# Patient Record
Sex: MALE | Race: BLACK OR AFRICAN AMERICAN | NOT HISPANIC OR LATINO | Employment: FULL TIME | URBAN - METROPOLITAN AREA
[De-identification: names, ages, dates, MRNs, and addresses within clinical notes are randomized per-mention and may not be internally consistent; named-entity substitution may affect disease eponyms.]

---

## 2017-06-08 ENCOUNTER — HISTORICAL (OUTPATIENT)
Dept: INTERNAL MEDICINE | Facility: CLINIC | Age: 52
End: 2017-06-08

## 2017-06-08 LAB
CHOLEST SERPL-MCNC: 226 MG/DL
CHOLEST/HDLC SERPL: 7.3 {RATIO} (ref 0–5)
EST. AVERAGE GLUCOSE BLD GHB EST-MCNC: 120 MG/DL
HBA1C MFR BLD: 5.8 % (ref 4.7–5.6)
HDLC SERPL-MCNC: 31 MG/DL
LDLC SERPL CALC-MCNC: 171 MG/DL (ref 0–130)
TRIGL SERPL-MCNC: 120 MG/DL
VLDLC SERPL CALC-MCNC: 24 MG/DL

## 2017-08-07 ENCOUNTER — HISTORICAL (OUTPATIENT)
Dept: INTERNAL MEDICINE | Facility: CLINIC | Age: 52
End: 2017-08-07

## 2017-08-07 LAB
CHOLEST SERPL-MCNC: 172 MG/DL
CHOLEST/HDLC SERPL: 4 {RATIO} (ref 0–5)
HDLC SERPL-MCNC: 43 MG/DL
LDLC SERPL CALC-MCNC: 108 MG/DL (ref 0–130)
TRIGL SERPL-MCNC: 103 MG/DL
VLDLC SERPL CALC-MCNC: 21 MG/DL

## 2017-10-18 LAB
COLOR STL: NORMAL
CONSISTENCY STL: NORMAL
HEMOCCULT SP1 STL QL: NEGATIVE

## 2017-10-19 LAB
COLOR STL: NORMAL
CONSISTENCY STL: NORMAL
HEMOCCULT SP2 STL QL: NEGATIVE

## 2017-10-20 LAB
COLOR STL: NORMAL
CONSISTENCY STL: NORMAL

## 2017-10-24 ENCOUNTER — HISTORICAL (OUTPATIENT)
Dept: INTERNAL MEDICINE | Facility: CLINIC | Age: 52
End: 2017-10-24

## 2017-10-24 LAB
ABS NEUT (OLG): 3.38 X10(3)/MCL (ref 2.1–9.2)
ALBUMIN SERPL-MCNC: 3.4 GM/DL (ref 3.4–5)
ALBUMIN/GLOB SERPL: 1 RATIO (ref 1–2)
ALP SERPL-CCNC: 52 UNIT/L (ref 45–117)
ALT SERPL-CCNC: 34 UNIT/L (ref 12–78)
AST SERPL-CCNC: 26 UNIT/L (ref 15–37)
BASOPHILS # BLD AUTO: 0.03 X10(3)/MCL
BASOPHILS NFR BLD AUTO: 0 % (ref 0–1)
BILIRUB SERPL-MCNC: 0.5 MG/DL (ref 0.2–1)
BILIRUBIN DIRECT+TOT PNL SERPL-MCNC: 0.1 MG/DL
BILIRUBIN DIRECT+TOT PNL SERPL-MCNC: 0.4 MG/DL
BUN SERPL-MCNC: 18 MG/DL (ref 7–18)
CALCIUM SERPL-MCNC: 8.8 MG/DL (ref 8.5–10.1)
CHLORIDE SERPL-SCNC: 103 MMOL/L (ref 98–107)
CO2 SERPL-SCNC: 31 MMOL/L (ref 21–32)
CREAT SERPL-MCNC: 0.9 MG/DL (ref 0.6–1.3)
EOSINOPHIL # BLD AUTO: 0.24 10*3/UL
EOSINOPHIL NFR BLD AUTO: 4 % (ref 0–5)
ERYTHROCYTE [DISTWIDTH] IN BLOOD BY AUTOMATED COUNT: 12.8 % (ref 11.5–14.5)
EST. AVERAGE GLUCOSE BLD GHB EST-MCNC: 140 MG/DL
GLOBULIN SER-MCNC: 3.6 GM/ML (ref 2.3–3.5)
GLUCOSE SERPL-MCNC: 108 MG/DL (ref 74–106)
HBA1C MFR BLD: 6.5 % (ref 4.2–6.3)
HCT VFR BLD AUTO: 44.2 % (ref 40–51)
HGB BLD-MCNC: 14.5 GM/DL (ref 13.5–17.5)
IMM GRANULOCYTES # BLD AUTO: 0.01 10*3/UL
IMM GRANULOCYTES NFR BLD AUTO: 0 %
LYMPHOCYTES # BLD AUTO: 2 X10(3)/MCL
LYMPHOCYTES NFR BLD AUTO: 32 % (ref 15–40)
MCH RBC QN AUTO: 27.5 PG (ref 26–34)
MCHC RBC AUTO-ENTMCNC: 32.8 GM/DL (ref 31–37)
MCV RBC AUTO: 83.9 FL (ref 80–100)
MONOCYTES # BLD AUTO: 0.65 X10(3)/MCL
MONOCYTES NFR BLD AUTO: 10 % (ref 4–12)
NEUTROPHILS # BLD AUTO: 3.38 X10(3)/MCL
NEUTROPHILS NFR BLD AUTO: 54 X10(3)/MCL
PLATELET # BLD AUTO: 233 X10(3)/MCL (ref 130–400)
PMV BLD AUTO: 9.9 FL (ref 7.4–10.4)
POTASSIUM SERPL-SCNC: 4 MMOL/L (ref 3.5–5.1)
PROT SERPL-MCNC: 7 GM/DL (ref 6.4–8.2)
PSA SERPL-MCNC: 0.9 NG/ML
RBC # BLD AUTO: 5.27 X10(6)/MCL (ref 4.5–5.9)
SODIUM SERPL-SCNC: 140 MMOL/L (ref 136–145)
TSH SERPL-ACNC: 0.92 MIU/L (ref 0.36–3.74)
WBC # SPEC AUTO: 6.3 X10(3)/MCL (ref 4.5–11)

## 2018-01-05 ENCOUNTER — HISTORICAL (OUTPATIENT)
Dept: INTERNAL MEDICINE | Facility: CLINIC | Age: 53
End: 2018-01-05

## 2018-01-05 LAB
BUN SERPL-MCNC: 16 MG/DL (ref 7–18)
CALCIUM SERPL-MCNC: 8.7 MG/DL (ref 8.5–10.1)
CHLORIDE SERPL-SCNC: 103 MMOL/L (ref 98–107)
CO2 SERPL-SCNC: 29 MMOL/L (ref 21–32)
CREAT SERPL-MCNC: 0.8 MG/DL (ref 0.6–1.3)
CREAT UR-MCNC: 138 MG/DL
EST. AVERAGE GLUCOSE BLD GHB EST-MCNC: 131 MG/DL
GLUCOSE SERPL-MCNC: 109 MG/DL (ref 74–106)
HBA1C MFR BLD: 6.2 % (ref 4.2–6.3)
MICROALBUMIN UR-MCNC: 7.5 MG/L (ref 0–19)
MICROALBUMIN/CREAT RATIO PNL UR: 5.4 MCG/MG CR (ref 0–29)
POTASSIUM SERPL-SCNC: 3.9 MMOL/L (ref 3.5–5.1)
SODIUM SERPL-SCNC: 139 MMOL/L (ref 136–145)

## 2018-03-29 ENCOUNTER — HISTORICAL (OUTPATIENT)
Dept: INTERNAL MEDICINE | Facility: CLINIC | Age: 53
End: 2018-03-29

## 2018-03-29 LAB
APPEARANCE, UA: CLEAR
BACTERIA #/AREA URNS AUTO: ABNORMAL /[HPF]
BILIRUB UR QL STRIP: NEGATIVE
CHOLEST SERPL-MCNC: 177 MG/DL
CHOLEST/HDLC SERPL: 5.1 {RATIO} (ref 0–5)
COLOR UR: YELLOW
CREAT UR-MCNC: 260 MG/DL
EST. AVERAGE GLUCOSE BLD GHB EST-MCNC: 131 MG/DL
GLUCOSE (UA): NORMAL
HBA1C MFR BLD: 6.2 % (ref 4.2–6.3)
HDLC SERPL-MCNC: 35 MG/DL
HGB UR QL STRIP: NEGATIVE
HYALINE CASTS #/AREA URNS LPF: ABNORMAL /[LPF]
KETONES UR QL STRIP: NEGATIVE
LDLC SERPL CALC-MCNC: 96 MG/DL (ref 0–130)
LEUKOCYTE ESTERASE UR QL STRIP: NEGATIVE
MICROALBUMIN UR-MCNC: 8.5 MG/L (ref 0–19)
MICROALBUMIN/CREAT RATIO PNL UR: 3.3 MCG/MG CR (ref 0–29)
NITRITE UR QL STRIP: NEGATIVE
PH UR STRIP: 7.5 [PH] (ref 4.5–8)
PROT UR QL STRIP: 20 MG/DL
RBC #/AREA URNS AUTO: ABNORMAL /[HPF]
SP GR UR STRIP: 1.02 (ref 1–1.03)
SQUAMOUS #/AREA URNS LPF: ABNORMAL /[LPF]
TRIGL SERPL-MCNC: 230 MG/DL
UROBILINOGEN UR STRIP-ACNC: 3 MG/DL
VLDLC SERPL CALC-MCNC: 46 MG/DL
WBC #/AREA URNS AUTO: ABNORMAL /HPF

## 2018-07-31 ENCOUNTER — HISTORICAL (OUTPATIENT)
Dept: INTERNAL MEDICINE | Facility: CLINIC | Age: 53
End: 2018-07-31

## 2018-07-31 LAB
ALBUMIN SERPL-MCNC: 3.6 GM/DL (ref 3.4–5)
ALBUMIN/GLOB SERPL: 1 RATIO (ref 1–2)
ALP SERPL-CCNC: 55 UNIT/L (ref 45–117)
ALT SERPL-CCNC: 30 UNIT/L (ref 12–78)
APPEARANCE, UA: CLEAR
AST SERPL-CCNC: 24 UNIT/L (ref 15–37)
BACTERIA #/AREA URNS AUTO: ABNORMAL /[HPF]
BILIRUB SERPL-MCNC: 0.5 MG/DL (ref 0.2–1)
BILIRUB UR QL STRIP: NEGATIVE
BILIRUBIN DIRECT+TOT PNL SERPL-MCNC: 0.1 MG/DL
BILIRUBIN DIRECT+TOT PNL SERPL-MCNC: 0.4 MG/DL
BUN SERPL-MCNC: 19 MG/DL (ref 7–18)
CALCIUM SERPL-MCNC: 8.7 MG/DL (ref 8.5–10.1)
CHLORIDE SERPL-SCNC: 104 MMOL/L (ref 98–107)
CO2 SERPL-SCNC: 29 MMOL/L (ref 21–32)
COLOR UR: NORMAL
CREAT SERPL-MCNC: 0.8 MG/DL (ref 0.6–1.3)
CREAT UR-MCNC: 183 MG/DL
EST. AVERAGE GLUCOSE BLD GHB EST-MCNC: 131 MG/DL
GLOBULIN SER-MCNC: 3.7 GM/ML (ref 2.3–3.5)
GLUCOSE (UA): NORMAL
GLUCOSE SERPL-MCNC: 105 MG/DL (ref 74–106)
HBA1C MFR BLD: 6.2 % (ref 4.2–6.3)
HGB UR QL STRIP: NEGATIVE
HYALINE CASTS #/AREA URNS LPF: ABNORMAL /[LPF]
KETONES UR QL STRIP: NEGATIVE
LEUKOCYTE ESTERASE UR QL STRIP: NEGATIVE
MICROALBUMIN UR-MCNC: 19 MG/L (ref 0–19)
MICROALBUMIN/CREAT RATIO PNL UR: 10.4 MCG/MG CR (ref 0–29)
NITRITE UR QL STRIP: NEGATIVE
PH UR STRIP: 6.5 [PH] (ref 4.5–8)
POTASSIUM SERPL-SCNC: 3.6 MMOL/L (ref 3.5–5.1)
PROT SERPL-MCNC: 7.3 GM/DL (ref 6.4–8.2)
PROT UR QL STRIP: NEGATIVE
RBC #/AREA URNS AUTO: ABNORMAL /[HPF]
SODIUM SERPL-SCNC: 140 MMOL/L (ref 136–145)
SP GR UR STRIP: 1.03 (ref 1–1.03)
SQUAMOUS #/AREA URNS LPF: ABNORMAL /[LPF]
UROBILINOGEN UR STRIP-ACNC: NORMAL
WBC #/AREA URNS AUTO: ABNORMAL /HPF

## 2018-08-23 ENCOUNTER — HISTORICAL (OUTPATIENT)
Dept: CARDIOLOGY | Facility: HOSPITAL | Age: 53
End: 2018-08-23

## 2018-09-04 ENCOUNTER — HISTORICAL (OUTPATIENT)
Dept: CARDIOLOGY | Facility: CLINIC | Age: 53
End: 2018-09-04

## 2018-09-04 LAB
APTT PPP: 28.6 SECOND(S) (ref 23.3–37)
BUN SERPL-MCNC: 18 MG/DL (ref 7–18)
CALCIUM SERPL-MCNC: 8.8 MG/DL (ref 8.5–10.1)
CHLORIDE SERPL-SCNC: 102 MMOL/L (ref 98–107)
CO2 SERPL-SCNC: 29 MMOL/L (ref 21–32)
CREAT SERPL-MCNC: 0.9 MG/DL (ref 0.6–1.3)
CREAT/UREA NIT SERPL: 20
ERYTHROCYTE [DISTWIDTH] IN BLOOD BY AUTOMATED COUNT: 12.5 % (ref 11.5–14.5)
GLUCOSE SERPL-MCNC: 146 MG/DL (ref 74–106)
HCT VFR BLD AUTO: 44.5 % (ref 40–51)
HGB BLD-MCNC: 14.6 GM/DL (ref 13.5–17.5)
INR PPP: 0.99 (ref 0.9–1.2)
MCH RBC QN AUTO: 27.7 PG (ref 26–34)
MCHC RBC AUTO-ENTMCNC: 32.8 GM/DL (ref 31–37)
MCV RBC AUTO: 84.3 FL (ref 80–100)
PLATELET # BLD AUTO: 229 X10(3)/MCL (ref 130–400)
PMV BLD AUTO: 9.5 FL (ref 7.4–10.4)
POTASSIUM SERPL-SCNC: 3.9 MMOL/L (ref 3.5–5.1)
PROTHROMBIN TIME: 12.4 SECOND(S) (ref 11.9–14.4)
RBC # BLD AUTO: 5.28 X10(6)/MCL (ref 4.5–5.9)
SODIUM SERPL-SCNC: 138 MMOL/L (ref 136–145)
WBC # SPEC AUTO: 5.7 X10(3)/MCL (ref 4.5–11)

## 2018-09-10 ENCOUNTER — HISTORICAL (OUTPATIENT)
Dept: CARDIOLOGY | Facility: HOSPITAL | Age: 53
End: 2018-09-10

## 2018-11-20 ENCOUNTER — HISTORICAL (OUTPATIENT)
Dept: INTERNAL MEDICINE | Facility: CLINIC | Age: 53
End: 2018-11-20

## 2018-11-20 LAB
ABS NEUT (OLG): 3.83 X10(3)/MCL (ref 2.1–9.2)
ALBUMIN SERPL-MCNC: 3.4 GM/DL (ref 3.4–5)
ALBUMIN/GLOB SERPL: 1 RATIO (ref 1–2)
ALP SERPL-CCNC: 58 UNIT/L (ref 45–117)
ALT SERPL-CCNC: 26 UNIT/L (ref 12–78)
APPEARANCE, UA: CLEAR
AST SERPL-CCNC: 20 UNIT/L (ref 15–37)
BACTERIA #/AREA URNS AUTO: ABNORMAL /[HPF]
BASOPHILS # BLD AUTO: 0.02 X10(3)/MCL
BASOPHILS NFR BLD AUTO: 0 %
BILIRUB SERPL-MCNC: 0.5 MG/DL (ref 0.2–1)
BILIRUB UR QL STRIP: NEGATIVE
BILIRUBIN DIRECT+TOT PNL SERPL-MCNC: 0.2 MG/DL
BILIRUBIN DIRECT+TOT PNL SERPL-MCNC: 0.3 MG/DL
BUN SERPL-MCNC: 18 MG/DL (ref 7–18)
CALCIUM SERPL-MCNC: 8.5 MG/DL (ref 8.5–10.1)
CHLORIDE SERPL-SCNC: 104 MMOL/L (ref 98–107)
CHOLEST SERPL-MCNC: 148 MG/DL
CHOLEST/HDLC SERPL: 4.2 {RATIO} (ref 0–5)
CO2 SERPL-SCNC: 31 MMOL/L (ref 21–32)
COLOR UR: YELLOW
CREAT SERPL-MCNC: 0.8 MG/DL (ref 0.6–1.3)
EOSINOPHIL # BLD AUTO: 0.24 X10(3)/MCL
EOSINOPHIL NFR BLD AUTO: 4 %
ERYTHROCYTE [DISTWIDTH] IN BLOOD BY AUTOMATED COUNT: 12.6 % (ref 11.5–14.5)
GLOBULIN SER-MCNC: 3.8 GM/ML (ref 2.3–3.5)
GLUCOSE (UA): NORMAL
GLUCOSE SERPL-MCNC: 111 MG/DL (ref 74–106)
HCT VFR BLD AUTO: 43.5 % (ref 40–51)
HDLC SERPL-MCNC: 35 MG/DL
HGB BLD-MCNC: 14.1 GM/DL (ref 13.5–17.5)
HGB UR QL STRIP: NEGATIVE
HYALINE CASTS #/AREA URNS LPF: ABNORMAL /[LPF]
IMM GRANULOCYTES # BLD AUTO: 0.01 10*3/UL
IMM GRANULOCYTES NFR BLD AUTO: 0 %
KETONES UR QL STRIP: NEGATIVE
LDLC SERPL CALC-MCNC: 96 MG/DL (ref 0–130)
LEUKOCYTE ESTERASE UR QL STRIP: NEGATIVE
LYMPHOCYTES # BLD AUTO: 1.83 X10(3)/MCL
LYMPHOCYTES NFR BLD AUTO: 28 % (ref 13–40)
MCH RBC QN AUTO: 27.6 PG (ref 26–34)
MCHC RBC AUTO-ENTMCNC: 32.4 GM/DL (ref 31–37)
MCV RBC AUTO: 85.1 FL (ref 80–100)
MONOCYTES # BLD AUTO: 0.63 X10(3)/MCL
MONOCYTES NFR BLD AUTO: 10 % (ref 4–12)
NEUTROPHILS # BLD AUTO: 3.83 X10(3)/MCL
NEUTROPHILS NFR BLD AUTO: 58 X10(3)/MCL
NITRITE UR QL STRIP: NEGATIVE
PH UR STRIP: 6 [PH] (ref 4.5–8)
PLATELET # BLD AUTO: 222 X10(3)/MCL (ref 130–400)
PMV BLD AUTO: 9.4 FL (ref 7.4–10.4)
POTASSIUM SERPL-SCNC: 3.8 MMOL/L (ref 3.5–5.1)
PROT SERPL-MCNC: 7.2 GM/DL (ref 6.4–8.2)
PROT UR QL STRIP: 20 MG/DL
PSA SERPL-MCNC: 0.8 NG/ML
RBC # BLD AUTO: 5.11 X10(6)/MCL (ref 4.5–5.9)
RBC #/AREA URNS AUTO: ABNORMAL /[HPF]
SODIUM SERPL-SCNC: 141 MMOL/L (ref 136–145)
SP GR UR STRIP: 1.03 (ref 1–1.03)
SQUAMOUS #/AREA URNS LPF: ABNORMAL /[LPF]
TRIGL SERPL-MCNC: 85 MG/DL
UROBILINOGEN UR STRIP-ACNC: 2 MG/DL
VLDLC SERPL CALC-MCNC: 17 MG/DL
WBC # SPEC AUTO: 6.6 X10(3)/MCL (ref 4.5–11)
WBC #/AREA URNS AUTO: ABNORMAL /HPF

## 2018-12-03 ENCOUNTER — HISTORICAL (OUTPATIENT)
Dept: INTERNAL MEDICINE | Facility: CLINIC | Age: 53
End: 2018-12-03

## 2019-03-08 ENCOUNTER — HISTORICAL (OUTPATIENT)
Dept: INTERNAL MEDICINE | Facility: CLINIC | Age: 54
End: 2019-03-08

## 2019-03-08 LAB
ALBUMIN SERPL-MCNC: 3.6 GM/DL (ref 3.4–5)
ALBUMIN/GLOB SERPL: 0.9 RATIO (ref 1.1–2)
ALP SERPL-CCNC: 56 UNIT/L (ref 45–117)
ALT SERPL-CCNC: 33 UNIT/L (ref 12–78)
AST SERPL-CCNC: 23 UNIT/L (ref 15–37)
BILIRUB SERPL-MCNC: 0.5 MG/DL (ref 0.2–1)
BILIRUBIN DIRECT+TOT PNL SERPL-MCNC: 0.1 MG/DL
BILIRUBIN DIRECT+TOT PNL SERPL-MCNC: 0.4 MG/DL
BUN SERPL-MCNC: 16 MG/DL (ref 7–18)
CALCIUM SERPL-MCNC: 8.8 MG/DL (ref 8.5–10.1)
CHLORIDE SERPL-SCNC: 104 MMOL/L (ref 98–107)
CHOLEST SERPL-MCNC: 176 MG/DL
CHOLEST/HDLC SERPL: 3.9 {RATIO} (ref 0–5)
CO2 SERPL-SCNC: 30 MMOL/L (ref 21–32)
CREAT SERPL-MCNC: 0.8 MG/DL (ref 0.6–1.3)
CREAT UR-MCNC: 77 MG/DL
EST. AVERAGE GLUCOSE BLD GHB EST-MCNC: 126 MG/DL
GLOBULIN SER-MCNC: 3.9 GM/ML (ref 2.3–3.5)
GLUCOSE SERPL-MCNC: 104 MG/DL (ref 74–106)
HBA1C MFR BLD: 6 % (ref 4.2–6.3)
HDLC SERPL-MCNC: 45 MG/DL
LDLC SERPL CALC-MCNC: 114 MG/DL (ref 0–130)
MICROALBUMIN UR-MCNC: <5 MG/L (ref 0–19)
MICROALBUMIN/CREAT RATIO PNL UR: <6.5 MCG/MG CR (ref 0–29)
POTASSIUM SERPL-SCNC: 4 MMOL/L (ref 3.5–5.1)
PROT SERPL-MCNC: 7.5 GM/DL (ref 6.4–8.2)
SODIUM SERPL-SCNC: 139 MMOL/L (ref 136–145)
TRIGL SERPL-MCNC: 86 MG/DL
VLDLC SERPL CALC-MCNC: 17 MG/DL

## 2019-03-12 ENCOUNTER — HISTORICAL (OUTPATIENT)
Dept: ADMINISTRATIVE | Facility: HOSPITAL | Age: 54
End: 2019-03-12

## 2019-03-20 ENCOUNTER — HISTORICAL (OUTPATIENT)
Dept: ADMINISTRATIVE | Facility: HOSPITAL | Age: 54
End: 2019-03-20

## 2019-04-03 ENCOUNTER — HISTORICAL (OUTPATIENT)
Dept: ADMINISTRATIVE | Facility: HOSPITAL | Age: 54
End: 2019-04-03

## 2019-05-01 ENCOUNTER — HISTORICAL (OUTPATIENT)
Dept: ADMINISTRATIVE | Facility: HOSPITAL | Age: 54
End: 2019-05-01

## 2019-09-16 ENCOUNTER — HISTORICAL (OUTPATIENT)
Dept: INTERNAL MEDICINE | Facility: CLINIC | Age: 54
End: 2019-09-16

## 2019-09-16 LAB
BUN SERPL-MCNC: 17 MG/DL (ref 7–18)
CALCIUM SERPL-MCNC: 8.6 MG/DL (ref 8.5–10.1)
CHLORIDE SERPL-SCNC: 105 MMOL/L (ref 98–107)
CHOLEST SERPL-MCNC: 156 MG/DL
CHOLEST/HDLC SERPL: 3.9 {RATIO} (ref 0–5)
CO2 SERPL-SCNC: 29 MMOL/L (ref 21–32)
CREAT SERPL-MCNC: 0.9 MG/DL (ref 0.6–1.3)
CREAT UR-MCNC: 172 MG/DL
CREAT/UREA NIT SERPL: 19
EST. AVERAGE GLUCOSE BLD GHB EST-MCNC: 123 MG/DL
GLUCOSE SERPL-MCNC: 98 MG/DL (ref 74–106)
HBA1C MFR BLD: 5.9 % (ref 4.2–6.3)
HDLC SERPL-MCNC: 40 MG/DL (ref 40–59)
LDLC SERPL CALC-MCNC: 103 MG/DL
MICROALBUMIN UR-MCNC: 6.8 MG/L (ref 0–19)
MICROALBUMIN/CREAT RATIO PNL UR: 4 MCG/MG CR (ref 0–29)
POTASSIUM SERPL-SCNC: 3.7 MMOL/L (ref 3.5–5.1)
SODIUM SERPL-SCNC: 140 MMOL/L (ref 136–145)
TRIGL SERPL-MCNC: 65 MG/DL
VLDLC SERPL CALC-MCNC: 13 MG/DL

## 2020-03-16 ENCOUNTER — HISTORICAL (OUTPATIENT)
Dept: LAB | Facility: HOSPITAL | Age: 55
End: 2020-03-16

## 2020-03-16 LAB
ABS NEUT (OLG): 2.98 X10(3)/MCL (ref 2.1–9.2)
ALBUMIN SERPL-MCNC: 3.4 GM/DL (ref 3.4–5)
ALBUMIN/GLOB SERPL: 0.9 RATIO (ref 1.1–2)
ALP SERPL-CCNC: 53 UNIT/L (ref 45–117)
ALT SERPL-CCNC: 29 UNIT/L (ref 12–78)
APPEARANCE, UA: CLEAR
AST SERPL-CCNC: 19 UNIT/L (ref 15–37)
BACTERIA #/AREA URNS AUTO: ABNORMAL /HPF
BASOPHILS # BLD AUTO: 0 X10(3)/MCL (ref 0–0.2)
BASOPHILS NFR BLD AUTO: 0 %
BILIRUB SERPL-MCNC: 0.3 MG/DL (ref 0.2–1)
BILIRUB UR QL STRIP: NEGATIVE
BILIRUBIN DIRECT+TOT PNL SERPL-MCNC: 0.1 MG/DL (ref 0–0.2)
BILIRUBIN DIRECT+TOT PNL SERPL-MCNC: 0.2 MG/DL
BUN SERPL-MCNC: 14 MG/DL (ref 7–18)
CALCIUM SERPL-MCNC: 8.3 MG/DL (ref 8.5–10.1)
CHLORIDE SERPL-SCNC: 107 MMOL/L (ref 98–107)
CHOLEST SERPL-MCNC: 157 MG/DL
CHOLEST/HDLC SERPL: 4.5 {RATIO} (ref 0–5)
CO2 SERPL-SCNC: 28 MMOL/L (ref 21–32)
COLOR UR: ABNORMAL
CREAT SERPL-MCNC: 0.8 MG/DL (ref 0.6–1.3)
DEPRECATED CALCIDIOL+CALCIFEROL SERPL-MC: 8.4 NG/ML (ref 30–80)
EOSINOPHIL # BLD AUTO: 0.5 X10(3)/MCL (ref 0–0.9)
EOSINOPHIL NFR BLD AUTO: 10 %
ERYTHROCYTE [DISTWIDTH] IN BLOOD BY AUTOMATED COUNT: 12.5 % (ref 11.5–14.5)
EST. AVERAGE GLUCOSE BLD GHB EST-MCNC: 131 MG/DL
GLOBULIN SER-MCNC: 3.6 GM/ML (ref 2.3–3.5)
GLUCOSE (UA): NEGATIVE
GLUCOSE SERPL-MCNC: 109 MG/DL (ref 74–106)
HBA1C MFR BLD: 6.2 % (ref 4.2–6.3)
HCT VFR BLD AUTO: 42.6 % (ref 40–51)
HDLC SERPL-MCNC: 35 MG/DL (ref 40–59)
HGB BLD-MCNC: 13.4 GM/DL (ref 13.5–17.5)
HGB UR QL STRIP: NEGATIVE
HYALINE CASTS #/AREA URNS LPF: ABNORMAL /LPF
IMM GRANULOCYTES # BLD AUTO: 0.01 10*3/UL
IMM GRANULOCYTES NFR BLD AUTO: 0 %
KETONES UR QL STRIP: NEGATIVE
LDLC SERPL CALC-MCNC: 109 MG/DL
LEUKOCYTE ESTERASE UR QL STRIP: 25 LEU/UL
LYMPHOCYTES # BLD AUTO: 1.6 X10(3)/MCL (ref 0.6–4.6)
LYMPHOCYTES NFR BLD AUTO: 28 %
MCH RBC QN AUTO: 27.5 PG (ref 26–34)
MCHC RBC AUTO-ENTMCNC: 31.5 GM/DL (ref 31–37)
MCV RBC AUTO: 87.3 FL (ref 80–100)
MONOCYTES # BLD AUTO: 0.5 X10(3)/MCL (ref 0.1–1.3)
MONOCYTES NFR BLD AUTO: 9 %
NEUTROPHILS # BLD AUTO: 2.98 X10(3)/MCL (ref 2.1–9.2)
NEUTROPHILS NFR BLD AUTO: 53 %
NITRITE UR QL STRIP: NEGATIVE
PH UR STRIP: 5.5 [PH] (ref 4.5–8)
PLATELET # BLD AUTO: 236 X10(3)/MCL (ref 130–400)
PMV BLD AUTO: 8.9 FL (ref 7.4–10.4)
POTASSIUM SERPL-SCNC: 3.8 MMOL/L (ref 3.5–5.1)
PROT SERPL-MCNC: 7 GM/DL (ref 6.4–8.2)
PROT UR QL STRIP: NEGATIVE
RBC # BLD AUTO: 4.88 X10(6)/MCL (ref 4.5–5.9)
RBC #/AREA URNS AUTO: ABNORMAL /HPF
SODIUM SERPL-SCNC: 139 MMOL/L (ref 136–145)
SP GR UR STRIP: 1.01 (ref 1–1.03)
SQUAMOUS #/AREA URNS LPF: ABNORMAL /LPF
TRIGL SERPL-MCNC: 66 MG/DL
TSH SERPL-ACNC: 1.46 MIU/L (ref 0.36–3.74)
UROBILINOGEN UR STRIP-ACNC: NORMAL
VLDLC SERPL CALC-MCNC: 13 MG/DL
WBC # SPEC AUTO: 5.6 X10(3)/MCL (ref 4.5–11)
WBC #/AREA URNS AUTO: ABNORMAL /HPF

## 2020-03-18 LAB — FINAL CULTURE: NORMAL

## 2020-05-20 ENCOUNTER — HISTORICAL (OUTPATIENT)
Dept: INTERNAL MEDICINE | Facility: CLINIC | Age: 55
End: 2020-05-20

## 2020-07-07 ENCOUNTER — HISTORICAL (OUTPATIENT)
Dept: LAB | Facility: HOSPITAL | Age: 55
End: 2020-07-07

## 2020-07-07 LAB
ALBUMIN SERPL-MCNC: 3.4 GM/DL (ref 3.4–5)
ALBUMIN/GLOB SERPL: 0.8 RATIO (ref 1.1–2)
ALP SERPL-CCNC: 46 UNIT/L (ref 45–117)
ALT SERPL-CCNC: 52 UNIT/L (ref 12–78)
AST SERPL-CCNC: 30 UNIT/L (ref 15–37)
BILIRUB SERPL-MCNC: 0.3 MG/DL (ref 0.2–1)
BILIRUBIN DIRECT+TOT PNL SERPL-MCNC: <0.1 MG/DL (ref 0–0.2)
BILIRUBIN DIRECT+TOT PNL SERPL-MCNC: ABNORMAL MG/DL
BUN SERPL-MCNC: 18 MG/DL (ref 7–18)
CALCIUM SERPL-MCNC: 8.8 MG/DL (ref 8.5–10.1)
CHLORIDE SERPL-SCNC: 105 MMOL/L (ref 98–107)
CHOLEST SERPL-MCNC: 160 MG/DL
CHOLEST/HDLC SERPL: 4 {RATIO} (ref 0–5)
CO2 SERPL-SCNC: 27 MMOL/L (ref 21–32)
CREAT SERPL-MCNC: 0.9 MG/DL (ref 0.6–1.3)
GLOBULIN SER-MCNC: 4 GM/ML (ref 2.3–3.5)
GLUCOSE SERPL-MCNC: 112 MG/DL (ref 74–106)
HDLC SERPL-MCNC: 40 MG/DL (ref 40–59)
LDLC SERPL CALC-MCNC: 102 MG/DL
POTASSIUM SERPL-SCNC: 3.8 MMOL/L (ref 3.5–5.1)
PROT SERPL-MCNC: 7.4 GM/DL (ref 6.4–8.2)
SODIUM SERPL-SCNC: 138 MMOL/L (ref 136–145)
TRIGL SERPL-MCNC: 92 MG/DL
VLDLC SERPL CALC-MCNC: 18 MG/DL

## 2020-10-06 ENCOUNTER — HISTORICAL (OUTPATIENT)
Dept: ADMINISTRATIVE | Facility: HOSPITAL | Age: 55
End: 2020-10-06

## 2020-11-16 ENCOUNTER — HISTORICAL (OUTPATIENT)
Dept: INTERNAL MEDICINE | Facility: CLINIC | Age: 55
End: 2020-11-16

## 2020-11-16 LAB
ABS NEUT (OLG): 3.92 X10(3)/MCL (ref 2.1–9.2)
ALBUMIN SERPL-MCNC: 3.7 GM/DL (ref 3.5–5)
ALBUMIN/GLOB SERPL: 1.2 RATIO (ref 1.1–2)
ALP SERPL-CCNC: 38 UNIT/L (ref 40–150)
ALT SERPL-CCNC: 26 UNIT/L (ref 0–55)
APPEARANCE, UA: CLEAR
AST SERPL-CCNC: 22 UNIT/L (ref 5–34)
BACTERIA #/AREA URNS AUTO: ABNORMAL /HPF
BASOPHILS # BLD AUTO: 0 X10(3)/MCL (ref 0–0.2)
BASOPHILS NFR BLD AUTO: 0 %
BILIRUB SERPL-MCNC: 0.4 MG/DL
BILIRUB UR QL STRIP: NEGATIVE
BILIRUBIN DIRECT+TOT PNL SERPL-MCNC: 0.2 MG/DL (ref 0–0.5)
BILIRUBIN DIRECT+TOT PNL SERPL-MCNC: 0.2 MG/DL (ref 0–0.8)
BUN SERPL-MCNC: 15 MG/DL (ref 8.4–25.7)
CALCIUM SERPL-MCNC: 9.3 MG/DL (ref 8.4–10.2)
CHLORIDE SERPL-SCNC: 103 MMOL/L (ref 98–107)
CHOLEST SERPL-MCNC: 140 MG/DL
CHOLEST/HDLC SERPL: 4 {RATIO} (ref 0–5)
CO2 SERPL-SCNC: 27 MMOL/L (ref 22–29)
COLOR UR: YELLOW
CREAT SERPL-MCNC: 0.9 MG/DL (ref 0.73–1.18)
DEPRECATED CALCIDIOL+CALCIFEROL SERPL-MC: 47.9 NG/ML (ref 30–80)
EOSINOPHIL # BLD AUTO: 0.6 X10(3)/MCL (ref 0–0.9)
EOSINOPHIL NFR BLD AUTO: 9 %
ERYTHROCYTE [DISTWIDTH] IN BLOOD BY AUTOMATED COUNT: 12.7 % (ref 11.5–14.5)
EST. AVERAGE GLUCOSE BLD GHB EST-MCNC: 137 MG/DL
GLOBULIN SER-MCNC: 3.2 GM/DL (ref 2.4–3.5)
GLUCOSE (UA): NEGATIVE
GLUCOSE SERPL-MCNC: 124 MG/DL (ref 74–100)
HBA1C MFR BLD: 6.4 %
HCT VFR BLD AUTO: 45.6 % (ref 40–51)
HDLC SERPL-MCNC: 35 MG/DL (ref 35–60)
HGB BLD-MCNC: 14.1 GM/DL (ref 13.5–17.5)
HGB UR QL STRIP: NEGATIVE
HYALINE CASTS #/AREA URNS LPF: ABNORMAL /LPF
IMM GRANULOCYTES # BLD AUTO: 0.01 10*3/UL
IMM GRANULOCYTES NFR BLD AUTO: 0 %
KETONES UR QL STRIP: NEGATIVE
LDLC SERPL CALC-MCNC: 88 MG/DL (ref 50–140)
LEUKOCYTE ESTERASE UR QL STRIP: NEGATIVE
LYMPHOCYTES # BLD AUTO: 1.8 X10(3)/MCL (ref 0.6–4.6)
LYMPHOCYTES NFR BLD AUTO: 25 %
MCH RBC QN AUTO: 27.3 PG (ref 26–34)
MCHC RBC AUTO-ENTMCNC: 30.9 GM/DL (ref 31–37)
MCV RBC AUTO: 88.4 FL (ref 80–100)
MONOCYTES # BLD AUTO: 0.7 X10(3)/MCL (ref 0.1–1.3)
MONOCYTES NFR BLD AUTO: 10 %
NEUTROPHILS # BLD AUTO: 3.92 X10(3)/MCL (ref 2.1–9.2)
NEUTROPHILS NFR BLD AUTO: 56 %
NITRITE UR QL STRIP: NEGATIVE
PH UR STRIP: 6 [PH] (ref 4.5–8)
PLATELET # BLD AUTO: 231 X10(3)/MCL (ref 130–400)
PMV BLD AUTO: 9.1 FL (ref 7.4–10.4)
POTASSIUM SERPL-SCNC: 4.3 MMOL/L (ref 3.5–5.1)
PROT SERPL-MCNC: 6.9 GM/DL (ref 6.4–8.3)
PROT UR QL STRIP: 10 MG/DL
PSA SERPL-MCNC: 0.57 NG/ML
RBC # BLD AUTO: 5.16 X10(6)/MCL (ref 4.5–5.9)
RBC #/AREA URNS AUTO: ABNORMAL /HPF
SODIUM SERPL-SCNC: 140 MMOL/L (ref 136–145)
SP GR UR STRIP: 1.02 (ref 1–1.03)
SQUAMOUS #/AREA URNS LPF: ABNORMAL /LPF
TRIGL SERPL-MCNC: 86 MG/DL (ref 34–140)
UROBILINOGEN UR STRIP-ACNC: NORMAL
VLDLC SERPL CALC-MCNC: 17 MG/DL
WBC # SPEC AUTO: 7 X10(3)/MCL (ref 4.5–11)
WBC #/AREA URNS AUTO: ABNORMAL /HPF

## 2020-11-19 ENCOUNTER — HISTORICAL (OUTPATIENT)
Dept: RADIOLOGY | Facility: HOSPITAL | Age: 55
End: 2020-11-19

## 2021-03-11 ENCOUNTER — HISTORICAL (OUTPATIENT)
Dept: INTERNAL MEDICINE | Facility: CLINIC | Age: 56
End: 2021-03-11

## 2021-03-11 LAB
ABS NEUT (OLG): 3.59 X10(3)/MCL (ref 2.1–9.2)
ALBUMIN SERPL-MCNC: 3.8 GM/DL (ref 3.5–5)
ALBUMIN/GLOB SERPL: 1.1 RATIO (ref 1.1–2)
ALP SERPL-CCNC: 45 UNIT/L (ref 40–150)
ALT SERPL-CCNC: 19 UNIT/L (ref 0–55)
AST SERPL-CCNC: 23 UNIT/L (ref 5–34)
BASOPHILS # BLD AUTO: 0 X10(3)/MCL (ref 0–0.2)
BASOPHILS NFR BLD AUTO: 0 %
BILIRUB SERPL-MCNC: 0.3 MG/DL
BILIRUBIN DIRECT+TOT PNL SERPL-MCNC: 0.1 MG/DL (ref 0–0.8)
BILIRUBIN DIRECT+TOT PNL SERPL-MCNC: 0.2 MG/DL (ref 0–0.5)
BUN SERPL-MCNC: 18.1 MG/DL (ref 8.4–25.7)
CALCIUM SERPL-MCNC: 9.2 MG/DL (ref 8.4–10.2)
CHLORIDE SERPL-SCNC: 105 MMOL/L (ref 98–107)
CHOLEST SERPL-MCNC: 142 MG/DL
CHOLEST/HDLC SERPL: 4 {RATIO} (ref 0–5)
CO2 SERPL-SCNC: 31 MMOL/L (ref 22–29)
CREAT SERPL-MCNC: 0.81 MG/DL (ref 0.73–1.18)
DEPRECATED CALCIDIOL+CALCIFEROL SERPL-MC: 51.2 NG/ML (ref 30–80)
EOSINOPHIL # BLD AUTO: 0.6 X10(3)/MCL (ref 0–0.9)
EOSINOPHIL NFR BLD AUTO: 8 %
ERYTHROCYTE [DISTWIDTH] IN BLOOD BY AUTOMATED COUNT: 12.7 % (ref 11.5–14.5)
EST. AVERAGE GLUCOSE BLD GHB EST-MCNC: 137 MG/DL
GLOBULIN SER-MCNC: 3.5 GM/DL (ref 2.4–3.5)
GLUCOSE SERPL-MCNC: 121 MG/DL (ref 74–100)
HBA1C MFR BLD: 6.4 %
HCT VFR BLD AUTO: 44.8 % (ref 40–51)
HDLC SERPL-MCNC: 34 MG/DL (ref 35–60)
HGB BLD-MCNC: 13.9 GM/DL (ref 13.5–17.5)
IMM GRANULOCYTES # BLD AUTO: 0.01 10*3/UL
IMM GRANULOCYTES NFR BLD AUTO: 0 %
LDLC SERPL CALC-MCNC: 93 MG/DL (ref 50–140)
LYMPHOCYTES # BLD AUTO: 2.2 X10(3)/MCL (ref 0.6–4.6)
LYMPHOCYTES NFR BLD AUTO: 30 %
MCH RBC QN AUTO: 27.4 PG (ref 26–34)
MCHC RBC AUTO-ENTMCNC: 31 GM/DL (ref 31–37)
MCV RBC AUTO: 88.4 FL (ref 80–100)
MONOCYTES # BLD AUTO: 0.8 X10(3)/MCL (ref 0.1–1.3)
MONOCYTES NFR BLD AUTO: 12 %
NEUTROPHILS # BLD AUTO: 3.59 X10(3)/MCL (ref 2.1–9.2)
NEUTROPHILS NFR BLD AUTO: 50 %
PLATELET # BLD AUTO: 239 X10(3)/MCL (ref 130–400)
PMV BLD AUTO: 9 FL (ref 7.4–10.4)
POTASSIUM SERPL-SCNC: 4 MMOL/L (ref 3.5–5.1)
PROT SERPL-MCNC: 7.3 GM/DL (ref 6.4–8.3)
RBC # BLD AUTO: 5.07 X10(6)/MCL (ref 4.5–5.9)
SODIUM SERPL-SCNC: 143 MMOL/L (ref 136–145)
T4 FREE SERPL-MCNC: 0.79 NG/DL (ref 0.7–1.48)
TRIGL SERPL-MCNC: 75 MG/DL (ref 34–140)
TSH SERPL-ACNC: 1.64 UIU/ML (ref 0.35–4.94)
VLDLC SERPL CALC-MCNC: 15 MG/DL
WBC # SPEC AUTO: 7.2 X10(3)/MCL (ref 4.5–11)

## 2021-07-25 ENCOUNTER — HISTORICAL (OUTPATIENT)
Dept: INFECTIOUS DISEASES | Facility: HOSPITAL | Age: 56
End: 2021-07-25

## 2021-09-17 ENCOUNTER — HISTORICAL (OUTPATIENT)
Dept: INTERNAL MEDICINE | Facility: CLINIC | Age: 56
End: 2021-09-17

## 2021-09-17 LAB
ABS NEUT (OLG): 3.7 X10(3)/MCL (ref 2.1–9.2)
ALBUMIN SERPL-MCNC: 3.5 GM/DL (ref 3.5–5)
ALBUMIN/GLOB SERPL: 1 RATIO (ref 1.1–2)
ALP SERPL-CCNC: 42 UNIT/L (ref 40–150)
ALT SERPL-CCNC: 28 UNIT/L (ref 0–55)
APPEARANCE, UA: CLEAR
AST SERPL-CCNC: 27 UNIT/L (ref 5–34)
BACTERIA #/AREA URNS AUTO: ABNORMAL /HPF
BASOPHILS # BLD AUTO: 0 X10(3)/MCL (ref 0–0.2)
BASOPHILS NFR BLD AUTO: 0 %
BILIRUB SERPL-MCNC: 0.4 MG/DL
BILIRUB UR QL STRIP: NEGATIVE
BILIRUBIN DIRECT+TOT PNL SERPL-MCNC: 0.1 MG/DL (ref 0–0.5)
BILIRUBIN DIRECT+TOT PNL SERPL-MCNC: 0.3 MG/DL (ref 0–0.8)
BUN SERPL-MCNC: 12.6 MG/DL (ref 8.4–25.7)
CALCIUM SERPL-MCNC: 9.3 MG/DL (ref 8.4–10.2)
CHLORIDE SERPL-SCNC: 105 MMOL/L (ref 98–107)
CHOLEST SERPL-MCNC: 157 MG/DL
CHOLEST/HDLC SERPL: 5 {RATIO} (ref 0–5)
CO2 SERPL-SCNC: 28 MMOL/L (ref 22–29)
COLOR UR: YELLOW
CREAT SERPL-MCNC: 0.76 MG/DL (ref 0.73–1.18)
DEPRECATED CALCIDIOL+CALCIFEROL SERPL-MC: 58.1 NG/ML (ref 30–80)
EOSINOPHIL # BLD AUTO: 0.4 X10(3)/MCL (ref 0–0.9)
EOSINOPHIL NFR BLD AUTO: 6 %
ERYTHROCYTE [DISTWIDTH] IN BLOOD BY AUTOMATED COUNT: 13.6 % (ref 11.5–14.5)
GLOBULIN SER-MCNC: 3.6 GM/DL (ref 2.4–3.5)
GLUCOSE (UA): NEGATIVE
GLUCOSE SERPL-MCNC: 116 MG/DL (ref 74–100)
HCT VFR BLD AUTO: 44.2 % (ref 40–51)
HDLC SERPL-MCNC: 34 MG/DL (ref 35–60)
HGB BLD-MCNC: 13.7 GM/DL (ref 13.5–17.5)
HGB UR QL STRIP: NEGATIVE
HYALINE CASTS #/AREA URNS LPF: ABNORMAL /LPF
IMM GRANULOCYTES # BLD AUTO: 0.01 10*3/UL
IMM GRANULOCYTES NFR BLD AUTO: 0 %
KETONES UR QL STRIP: NEGATIVE
LDLC SERPL CALC-MCNC: 108 MG/DL (ref 50–140)
LEUKOCYTE ESTERASE UR QL STRIP: NEGATIVE
LYMPHOCYTES # BLD AUTO: 1.8 X10(3)/MCL (ref 0.6–4.6)
LYMPHOCYTES NFR BLD AUTO: 26 %
MCH RBC QN AUTO: 27.2 PG (ref 26–34)
MCHC RBC AUTO-ENTMCNC: 31 GM/DL (ref 31–37)
MCV RBC AUTO: 87.7 FL (ref 80–100)
MONOCYTES # BLD AUTO: 0.8 X10(3)/MCL (ref 0.1–1.3)
MONOCYTES NFR BLD AUTO: 12 %
NEUTROPHILS # BLD AUTO: 3.7 X10(3)/MCL (ref 2.1–9.2)
NEUTROPHILS NFR BLD AUTO: 56 %
NITRITE UR QL STRIP: NEGATIVE
NRBC BLD AUTO-RTO: 0 % (ref 0–0.2)
PH UR STRIP: 6.5 [PH] (ref 4.5–8)
PLATELET # BLD AUTO: 255 X10(3)/MCL (ref 130–400)
PMV BLD AUTO: 9.5 FL (ref 7.4–10.4)
POTASSIUM SERPL-SCNC: 4.2 MMOL/L (ref 3.5–5.1)
PROT SERPL-MCNC: 7.1 GM/DL (ref 6.4–8.3)
PROT UR QL STRIP: 30 MG/DL
PSA SERPL-MCNC: 0.58 NG/ML
RBC # BLD AUTO: 5.04 X10(6)/MCL (ref 4.5–5.9)
RBC #/AREA URNS AUTO: ABNORMAL /HPF
SODIUM SERPL-SCNC: 140 MMOL/L (ref 136–145)
SP GR UR STRIP: 1.04 (ref 1–1.03)
SQUAMOUS #/AREA URNS LPF: ABNORMAL /LPF
TRIGL SERPL-MCNC: 74 MG/DL (ref 34–140)
TSH SERPL-ACNC: 1.46 UIU/ML (ref 0.35–4.94)
UROBILINOGEN UR STRIP-ACNC: 2 MG/DL
VLDLC SERPL CALC-MCNC: 15 MG/DL
WBC # SPEC AUTO: 6.7 X10(3)/MCL (ref 4.5–11)
WBC #/AREA URNS AUTO: ABNORMAL /HPF

## 2022-03-10 ENCOUNTER — HISTORICAL (OUTPATIENT)
Dept: NEPHROLOGY | Facility: CLINIC | Age: 57
End: 2022-03-10

## 2022-03-10 LAB
ABS NEUT (OLG): 3.57 (ref 2.1–9.2)
ALBUMIN SERPL-MCNC: 3.6 G/DL (ref 3.5–5)
ALBUMIN/GLOB SERPL: 1 {RATIO} (ref 1.1–2)
ALP SERPL-CCNC: 38 U/L (ref 40–150)
ALT SERPL-CCNC: 24 U/L (ref 0–55)
APPEARANCE, UA: CLEAR
AST SERPL-CCNC: 31 U/L (ref 5–34)
BACTERIA SPEC CULT: NORMAL
BASOPHILS # BLD AUTO: 0 10*3/UL (ref 0–0.2)
BASOPHILS NFR BLD AUTO: 0 %
BILIRUB SERPL-MCNC: 0.5 MG/DL
BILIRUB UR QL STRIP: NEGATIVE
BILIRUBIN DIRECT+TOT PNL SERPL-MCNC: 0.2 (ref 0–0.5)
BILIRUBIN DIRECT+TOT PNL SERPL-MCNC: 0.3 (ref 0–0.8)
BUN SERPL-MCNC: 15.8 MG/DL (ref 8.4–25.7)
CALCIUM SERPL-MCNC: 9.3 MG/DL (ref 8.7–10.5)
CHLORIDE SERPL-SCNC: 105 MMOL/L (ref 98–107)
CHOLEST SERPL-MCNC: 144 MG/DL
CHOLEST/HDLC SERPL: 4 {RATIO} (ref 0–5)
CO2 SERPL-SCNC: 28 MMOL/L (ref 22–29)
COLOR UR: NORMAL
CREAT SERPL-MCNC: 0.78 MG/DL (ref 0.73–1.18)
EOSINOPHIL # BLD AUTO: 0.4 10*3/UL (ref 0–0.9)
EOSINOPHIL NFR BLD AUTO: 7 %
ERYTHROCYTE [DISTWIDTH] IN BLOOD BY AUTOMATED COUNT: 13.2 % (ref 11.5–14.5)
EST. AVERAGE GLUCOSE BLD GHB EST-MCNC: 131.2 MG/DL
FLAG2 (OHS): 60
FLAG3 (OHS): 80
FLAGS (OHS): 80
GLOBULIN SER-MCNC: 3.5 G/DL (ref 2.4–3.5)
GLUCOSE (UA): NORMAL
GLUCOSE SERPL-MCNC: 118 MG/DL (ref 74–100)
HBA1C MFR BLD: 6.2 %
HCT VFR BLD AUTO: 44 % (ref 40–51)
HDLC SERPL-MCNC: 33 MG/DL (ref 35–60)
HEMOLYSIS INTERF INDEX SERPL-ACNC: 5
HGB BLD-MCNC: 13.8 G/DL (ref 13.5–17.5)
HGB UR QL STRIP: NEGATIVE
HYALINE CASTS #/AREA URNS LPF: NORMAL /[LPF]
ICTERIC INTERF INDEX SERPL-ACNC: 1
IMM GRANULOCYTES # BLD AUTO: 0.02 10*3/UL
IMM GRANULOCYTES NFR BLD AUTO: 0 %
IMM. NE 2 SUSPECT FLAG (OHS): 30
KETONES UR QL STRIP: NEGATIVE
LDLC SERPL CALC-MCNC: 96 MG/DL (ref 50–140)
LEUKOCYTE ESTERASE UR QL STRIP: NEGATIVE
LIPEMIC INTERF INDEX SERPL-ACNC: 4
LOW EVENT # SUSPECT FLAG (OHS): 80
LYMPHOCYTES # BLD AUTO: 1.8 10*3/UL (ref 0.6–4.6)
LYMPHOCYTES NFR BLD AUTO: 27 %
MANUAL DIFF? (OHS): NO
MCH RBC QN AUTO: 27.5 PG (ref 26–34)
MCHC RBC AUTO-ENTMCNC: 31.4 G/DL (ref 31–37)
MCV RBC AUTO: 87.6 FL (ref 80–100)
MO BLASTS SUSPECT FLAG (OHS): 40
MONOCYTES # BLD AUTO: 0.6 10*3/UL (ref 0.1–1.3)
MONOCYTES NFR BLD AUTO: 10 %
MUCOUS THREADS URNS QL MICRO: NORMAL
NEUTROPHILS # BLD AUTO: 3.57 10*3/UL (ref 2.1–9.2)
NEUTROPHILS NFR BLD AUTO: 55 %
NITRITE UR QL STRIP: NEGATIVE
NRBC BLD AUTO-RTO: 0 % (ref 0–0.2)
PH UR STRIP: 7 [PH] (ref 4.5–8)
PLATELET # BLD AUTO: 259 10*3/UL (ref 130–400)
PMV BLD AUTO: 9.1 FL (ref 7.4–10.4)
POTASSIUM SERPL-SCNC: 4.4 MMOL/L (ref 3.5–5.1)
PROT SERPL-MCNC: 7.1 G/DL (ref 6.4–8.3)
PROT UR QL STRIP: NEGATIVE
RBC # BLD AUTO: 5.02 10*6/UL (ref 4.5–5.9)
RBC #/AREA URNS HPF: NORMAL /[HPF] (ref 0–5)
SODIUM SERPL-SCNC: 141 MMOL/L (ref 136–145)
SP GR UR STRIP: 1.02 (ref 1–1.03)
SQUAMOUS EPITHELIAL, UA: NORMAL
TRIGL SERPL-MCNC: 76 MG/DL (ref 34–140)
UROBILINOGEN UR STRIP-ACNC: NORMAL
VLDLC SERPL CALC-MCNC: 15 MG/DL
WBC # SPEC AUTO: 6.5 10*3/UL (ref 4.5–11)
WBC #/AREA URNS HPF: NORMAL /[HPF] (ref 0–5)

## 2022-04-11 ENCOUNTER — HISTORICAL (OUTPATIENT)
Dept: ADMINISTRATIVE | Facility: HOSPITAL | Age: 57
End: 2022-04-11
Payer: COMMERCIAL

## 2022-04-26 VITALS
OXYGEN SATURATION: 100 % | DIASTOLIC BLOOD PRESSURE: 71 MMHG | WEIGHT: 282.63 LBS | BODY MASS INDEX: 44.36 KG/M2 | SYSTOLIC BLOOD PRESSURE: 122 MMHG | HEIGHT: 67 IN

## 2022-09-19 ENCOUNTER — LAB VISIT (OUTPATIENT)
Dept: LAB | Facility: HOSPITAL | Age: 57
End: 2022-09-19
Attending: NURSE PRACTITIONER
Payer: COMMERCIAL

## 2022-09-19 DIAGNOSIS — I10 HYPERTENSION, UNSPECIFIED TYPE: Primary | ICD-10-CM

## 2022-09-19 DIAGNOSIS — E78.5 HYPERLIPIDEMIA, UNSPECIFIED HYPERLIPIDEMIA TYPE: ICD-10-CM

## 2022-09-19 DIAGNOSIS — R73.03 PREDIABETES: ICD-10-CM

## 2022-09-19 DIAGNOSIS — Z12.11 COLON CANCER SCREENING: ICD-10-CM

## 2022-09-19 LAB
ALBUMIN SERPL-MCNC: 3.7 GM/DL (ref 3.5–5)
ALBUMIN/GLOB SERPL: 1 RATIO (ref 1.1–2)
ALP SERPL-CCNC: 42 UNIT/L (ref 40–150)
ALT SERPL-CCNC: 35 UNIT/L (ref 0–55)
APPEARANCE UR: CLEAR
AST SERPL-CCNC: 24 UNIT/L (ref 5–34)
BACTERIA #/AREA URNS AUTO: ABNORMAL /HPF
BASOPHILS # BLD AUTO: 0.02 X10(3)/MCL (ref 0–0.2)
BASOPHILS NFR BLD AUTO: 0.3 %
BILIRUB UR QL STRIP.AUTO: NEGATIVE MG/DL
BILIRUBIN DIRECT+TOT PNL SERPL-MCNC: 0.4 MG/DL
BUN SERPL-MCNC: 16.8 MG/DL (ref 8.4–25.7)
CALCIUM SERPL-MCNC: 9.6 MG/DL (ref 8.4–10.2)
CHLORIDE SERPL-SCNC: 103 MMOL/L (ref 98–107)
CHOLEST SERPL-MCNC: 179 MG/DL
CHOLEST/HDLC SERPL: 5 {RATIO} (ref 0–5)
CO2 SERPL-SCNC: 29 MMOL/L (ref 22–29)
COLOR UR AUTO: ABNORMAL
CREAT SERPL-MCNC: 0.8 MG/DL (ref 0.73–1.18)
DEPRECATED CALCIDIOL+CALCIFEROL SERPL-MC: 42.2 NG/ML (ref 30–80)
EOSINOPHIL # BLD AUTO: 0.26 X10(3)/MCL (ref 0–0.9)
EOSINOPHIL NFR BLD AUTO: 4.1 %
ERYTHROCYTE [DISTWIDTH] IN BLOOD BY AUTOMATED COUNT: 12.8 % (ref 11.5–17)
EST. AVERAGE GLUCOSE BLD GHB EST-MCNC: 142.7 MG/DL
GFR SERPLBLD CREATININE-BSD FMLA CKD-EPI: >60 MLS/MIN/1.73/M2
GLOBULIN SER-MCNC: 3.7 GM/DL (ref 2.4–3.5)
GLUCOSE SERPL-MCNC: 136 MG/DL (ref 74–100)
GLUCOSE UR QL STRIP.AUTO: NORMAL MG/DL
HBA1C MFR BLD: 6.6 %
HCT VFR BLD AUTO: 45 % (ref 42–52)
HDLC SERPL-MCNC: 36 MG/DL (ref 35–60)
HGB BLD-MCNC: 14.4 GM/DL (ref 14–18)
HYALINE CASTS #/AREA URNS LPF: ABNORMAL /LPF
IMM GRANULOCYTES # BLD AUTO: 0.01 X10(3)/MCL (ref 0–0.04)
IMM GRANULOCYTES NFR BLD AUTO: 0.2 %
KETONES UR QL STRIP.AUTO: NEGATIVE MG/DL
LDLC SERPL CALC-MCNC: 112 MG/DL (ref 50–140)
LEUKOCYTE ESTERASE UR QL STRIP.AUTO: NEGATIVE UNIT/L
LYMPHOCYTES # BLD AUTO: 1.92 X10(3)/MCL (ref 0.6–4.6)
LYMPHOCYTES NFR BLD AUTO: 30.1 %
MCH RBC QN AUTO: 27.6 PG (ref 27–31)
MCHC RBC AUTO-ENTMCNC: 32 MG/DL (ref 33–36)
MCV RBC AUTO: 86.2 FL (ref 80–94)
MONOCYTES # BLD AUTO: 0.58 X10(3)/MCL (ref 0.1–1.3)
MONOCYTES NFR BLD AUTO: 9.1 %
NEUTROPHILS # BLD AUTO: 3.6 X10(3)/MCL (ref 2.1–9.2)
NEUTROPHILS NFR BLD AUTO: 56.2 %
NITRITE UR QL STRIP.AUTO: NEGATIVE
NRBC BLD AUTO-RTO: 0 %
PH UR STRIP.AUTO: 8 [PH]
PLATELET # BLD AUTO: 248 X10(3)/MCL (ref 130–400)
PMV BLD AUTO: 9 FL (ref 7.4–10.4)
POTASSIUM SERPL-SCNC: 4 MMOL/L (ref 3.5–5.1)
PROT SERPL-MCNC: 7.4 GM/DL (ref 6.4–8.3)
PROT UR QL STRIP.AUTO: ABNORMAL MG/DL
PSA SERPL-MCNC: 1.91 NG/ML
RBC # BLD AUTO: 5.22 X10(6)/MCL (ref 4.7–6.1)
RBC #/AREA URNS AUTO: ABNORMAL /HPF
RBC UR QL AUTO: NEGATIVE UNIT/L
SODIUM SERPL-SCNC: 142 MMOL/L (ref 136–145)
SP GR UR STRIP.AUTO: 1.02
SQUAMOUS #/AREA URNS LPF: ABNORMAL /HPF
T4 FREE SERPL-MCNC: 0.86 NG/DL (ref 0.7–1.48)
TRIGL SERPL-MCNC: 156 MG/DL (ref 34–140)
TSH SERPL-ACNC: 0.95 UIU/ML (ref 0.35–4.94)
UROBILINOGEN UR STRIP-ACNC: NORMAL MG/DL
VLDLC SERPL CALC-MCNC: 31 MG/DL
WBC # SPEC AUTO: 6.4 X10(3)/MCL (ref 4.5–11.5)
WBC #/AREA URNS AUTO: ABNORMAL /HPF

## 2022-09-19 PROCEDURE — 84153 ASSAY OF PSA TOTAL: CPT

## 2022-09-19 PROCEDURE — 82274 ASSAY TEST FOR BLOOD FECAL: CPT

## 2022-09-19 PROCEDURE — 81001 URINALYSIS AUTO W/SCOPE: CPT

## 2022-09-19 PROCEDURE — 84443 ASSAY THYROID STIM HORMONE: CPT

## 2022-09-19 PROCEDURE — 80061 LIPID PANEL: CPT

## 2022-09-19 PROCEDURE — 36415 COLL VENOUS BLD VENIPUNCTURE: CPT

## 2022-09-19 PROCEDURE — 85025 COMPLETE CBC W/AUTO DIFF WBC: CPT

## 2022-09-19 PROCEDURE — 80053 COMPREHEN METABOLIC PANEL: CPT

## 2022-09-19 PROCEDURE — 83036 HEMOGLOBIN GLYCOSYLATED A1C: CPT

## 2022-09-19 PROCEDURE — 84439 ASSAY OF FREE THYROXINE: CPT

## 2022-09-19 PROCEDURE — 82306 VITAMIN D 25 HYDROXY: CPT

## 2022-09-21 ENCOUNTER — CLINICAL SUPPORT (OUTPATIENT)
Dept: INTERNAL MEDICINE | Facility: CLINIC | Age: 57
End: 2022-09-21
Attending: NURSE PRACTITIONER
Payer: COMMERCIAL

## 2022-09-21 ENCOUNTER — OFFICE VISIT (OUTPATIENT)
Dept: INTERNAL MEDICINE | Facility: CLINIC | Age: 57
End: 2022-09-21
Payer: COMMERCIAL

## 2022-09-21 VITALS
HEART RATE: 93 BPM | DIASTOLIC BLOOD PRESSURE: 80 MMHG | RESPIRATION RATE: 16 BRPM | BODY MASS INDEX: 48.21 KG/M2 | WEIGHT: 300 LBS | SYSTOLIC BLOOD PRESSURE: 133 MMHG | HEIGHT: 66 IN | TEMPERATURE: 98 F

## 2022-09-21 DIAGNOSIS — E55.9 VITAMIN D DEFICIENCY: ICD-10-CM

## 2022-09-21 DIAGNOSIS — Z11.59 NEED FOR HEPATITIS C SCREENING TEST: ICD-10-CM

## 2022-09-21 DIAGNOSIS — E78.2 MIXED HYPERLIPIDEMIA: ICD-10-CM

## 2022-09-21 DIAGNOSIS — N52.9 ERECTILE DYSFUNCTION, UNSPECIFIED ERECTILE DYSFUNCTION TYPE: ICD-10-CM

## 2022-09-21 DIAGNOSIS — Z12.11 COLON CANCER SCREENING: ICD-10-CM

## 2022-09-21 DIAGNOSIS — Z23 IMMUNIZATION DUE: ICD-10-CM

## 2022-09-21 DIAGNOSIS — E11.9 TYPE 2 DIABETES MELLITUS WITHOUT COMPLICATION, WITHOUT LONG-TERM CURRENT USE OF INSULIN: Primary | ICD-10-CM

## 2022-09-21 DIAGNOSIS — E11.9 TYPE 2 DIABETES MELLITUS WITHOUT COMPLICATION, WITHOUT LONG-TERM CURRENT USE OF INSULIN: ICD-10-CM

## 2022-09-21 DIAGNOSIS — I10 PRIMARY HYPERTENSION: ICD-10-CM

## 2022-09-21 PROBLEM — E66.01 MORBID OBESITY: Status: ACTIVE | Noted: 2022-09-21

## 2022-09-21 PROBLEM — R31.29 MICROSCOPIC HEMATURIA: Status: ACTIVE | Noted: 2022-09-21

## 2022-09-21 PROBLEM — I25.10 CORONARY ARTERIOSCLEROSIS IN NATIVE ARTERY: Status: ACTIVE | Noted: 2022-09-21

## 2022-09-21 PROBLEM — M72.2 PLANTAR FASCIITIS: Status: ACTIVE | Noted: 2022-09-21

## 2022-09-21 PROCEDURE — 3075F SYST BP GE 130 - 139MM HG: CPT | Mod: CPTII,,, | Performed by: NURSE PRACTITIONER

## 2022-09-21 PROCEDURE — 4010F PR ACE/ARB THEARPY RXD/TAKEN: ICD-10-PCS | Mod: CPTII,,, | Performed by: NURSE PRACTITIONER

## 2022-09-21 PROCEDURE — 3008F PR BODY MASS INDEX (BMI) DOCUMENTED: ICD-10-PCS | Mod: CPTII,,, | Performed by: NURSE PRACTITIONER

## 2022-09-21 PROCEDURE — 92228 IMG RTA DETC/MNTR DS PHY/QHP: CPT | Mod: PBBFAC

## 2022-09-21 PROCEDURE — 3008F BODY MASS INDEX DOCD: CPT | Mod: CPTII,,, | Performed by: NURSE PRACTITIONER

## 2022-09-21 PROCEDURE — 1159F MED LIST DOCD IN RCRD: CPT | Mod: CPTII,,, | Performed by: NURSE PRACTITIONER

## 2022-09-21 PROCEDURE — 1159F PR MEDICATION LIST DOCUMENTED IN MEDICAL RECORD: ICD-10-PCS | Mod: CPTII,,, | Performed by: NURSE PRACTITIONER

## 2022-09-21 PROCEDURE — 99214 OFFICE O/P EST MOD 30 MIN: CPT | Mod: PBBFAC | Performed by: NURSE PRACTITIONER

## 2022-09-21 PROCEDURE — 3079F DIAST BP 80-89 MM HG: CPT | Mod: CPTII,,, | Performed by: NURSE PRACTITIONER

## 2022-09-21 PROCEDURE — 4010F ACE/ARB THERAPY RXD/TAKEN: CPT | Mod: CPTII,,, | Performed by: NURSE PRACTITIONER

## 2022-09-21 PROCEDURE — 90686 IIV4 VACC NO PRSV 0.5 ML IM: CPT | Mod: PBBFAC

## 2022-09-21 PROCEDURE — 3079F PR MOST RECENT DIASTOLIC BLOOD PRESSURE 80-89 MM HG: ICD-10-PCS | Mod: CPTII,,, | Performed by: NURSE PRACTITIONER

## 2022-09-21 PROCEDURE — 3075F PR MOST RECENT SYSTOLIC BLOOD PRESS GE 130-139MM HG: ICD-10-PCS | Mod: CPTII,,, | Performed by: NURSE PRACTITIONER

## 2022-09-21 PROCEDURE — 99214 PR OFFICE/OUTPT VISIT, EST, LEVL IV, 30-39 MIN: ICD-10-PCS | Mod: S$PBB,,, | Performed by: NURSE PRACTITIONER

## 2022-09-21 PROCEDURE — 1160F PR REVIEW ALL MEDS BY PRESCRIBER/CLIN PHARMACIST DOCUMENTED: ICD-10-PCS | Mod: CPTII,,, | Performed by: NURSE PRACTITIONER

## 2022-09-21 PROCEDURE — 1160F RVW MEDS BY RX/DR IN RCRD: CPT | Mod: CPTII,,, | Performed by: NURSE PRACTITIONER

## 2022-09-21 PROCEDURE — 99214 OFFICE O/P EST MOD 30 MIN: CPT | Mod: S$PBB,,, | Performed by: NURSE PRACTITIONER

## 2022-09-21 RX ORDER — EZETIMIBE 10 MG/1
10 TABLET ORAL NIGHTLY
Qty: 90 TABLET | Refills: 1 | Status: SHIPPED | OUTPATIENT
Start: 2022-09-21 | End: 2023-03-21 | Stop reason: SDUPTHER

## 2022-09-21 RX ORDER — LISINOPRIL 20 MG/1
20 TABLET ORAL NIGHTLY
Qty: 90 TABLET | Refills: 1 | Status: SHIPPED | OUTPATIENT
Start: 2022-09-21 | End: 2023-03-21 | Stop reason: SDUPTHER

## 2022-09-21 RX ORDER — TADALAFIL 20 MG/1
20 TABLET ORAL DAILY PRN
Qty: 10 TABLET | Refills: 6 | Status: SHIPPED | OUTPATIENT
Start: 2022-09-21 | End: 2023-03-21 | Stop reason: SDUPTHER

## 2022-09-21 RX ORDER — ROSUVASTATIN CALCIUM 40 MG/1
40 TABLET, COATED ORAL
COMMUNITY
Start: 2022-02-21 | End: 2022-09-21 | Stop reason: SDUPTHER

## 2022-09-21 RX ORDER — LISINOPRIL 20 MG/1
20 TABLET ORAL
COMMUNITY
Start: 2022-02-21 | End: 2022-09-21 | Stop reason: SDUPTHER

## 2022-09-21 RX ORDER — TADALAFIL 20 MG/1
20 TABLET ORAL
COMMUNITY
Start: 2021-09-20 | End: 2022-09-21 | Stop reason: SDUPTHER

## 2022-09-21 RX ORDER — ACETAMINOPHEN 325 MG/1
650 TABLET ORAL
COMMUNITY
Start: 2020-11-18 | End: 2023-09-21 | Stop reason: ALTCHOICE

## 2022-09-21 RX ORDER — ROSUVASTATIN CALCIUM 40 MG/1
40 TABLET, COATED ORAL NIGHTLY
Qty: 90 TABLET | Refills: 1 | Status: SHIPPED | OUTPATIENT
Start: 2022-09-21 | End: 2023-03-21 | Stop reason: SDUPTHER

## 2022-09-21 RX ORDER — LISINOPRIL AND HYDROCHLOROTHIAZIDE 12.5; 2 MG/1; MG/1
TABLET ORAL
COMMUNITY
Start: 2022-02-21 | End: 2022-09-21 | Stop reason: SDUPTHER

## 2022-09-21 RX ORDER — EZETIMIBE 10 MG/1
10 TABLET ORAL
COMMUNITY
Start: 2022-02-21 | End: 2022-09-21 | Stop reason: SDUPTHER

## 2022-09-21 RX ORDER — LISINOPRIL AND HYDROCHLOROTHIAZIDE 12.5; 2 MG/1; MG/1
1 TABLET ORAL DAILY
Qty: 90 TABLET | Refills: 1 | Status: SHIPPED | OUTPATIENT
Start: 2022-09-21 | End: 2023-03-21 | Stop reason: SDUPTHER

## 2022-09-21 NOTE — ASSESSMENT & PLAN NOTE
RX lisinopril 20 mg daily  RX hctz / lisinopril 12.5/ 20 mg daily   Low Sodium Diet (Dash Diet - less than 2 grams of sodium per day).  Maintain healthy weight with goal BMI <30. Exercise 30 minutes per day 5 days per week.

## 2022-09-21 NOTE — ASSESSMENT & PLAN NOTE
RX rosuvastatin 40 mg q hs   Follow a low cholesterol, low saturated fat diet with less than 200 mg of cholesterol a day.   Avoid fried foods and high saturated fats (clinton, sausage, cookies, cakes, chips, cheese, whole milk, butter, mayonnaise, creamy dressings, gravy, stew, gumbo, boudin, cracklins and cream sauces).  Add flax seed or fish oil supplements to diet.   Increase dietary fiber.   Regular exercise improves cholesterol levels.  Physical activity 5 times a week for 30 minutes per day (or 150 minutes per week).   Stressed importance of dietary modifications.

## 2022-09-21 NOTE — ASSESSMENT & PLAN NOTE
Referral to Diabetes Education   Follow ADA diet.  Avoid soda, simple sweets, and limit rice/pasta/bread/starches and consume brown options when possible.   Maintain healthy weight with BMI goal <30.   Perform aerobic exercise for 150 minutes per week (or 5 days a week for 30 minutes each day).   Examine feet daily.

## 2022-09-21 NOTE — PROGRESS NOTES
FANG Harkins   [facility]  [address] [facility] [address] [address]      PATIENT NAME: Jesus Martinez  : 1965  DATE: 22  MRN: [id number]      Billing Provider: FANG Harkins  Level of Service: NC OFFICE/OUTPT VISIT, EST, LEVL IV, 30-39 MIN  Patient PCP Information       Provider PCP Type    FANG Harkins General            Reason for Visit / Chief Complaint: Follow-up (Lab review / aleve arthritis consult )       History of Present Illness / Problem Focused Workflow     Jesus Martinez presents to the clinic with Follow-up (Lab review / aleve arthritis consult )     58 yo AAM here today for f/u  PMHx includes mild CAD, HLD, HTN, T2DM, Vitamin D deficiency, right knee pain, and ED. Former smoker.  <10 pack/yr history of smoking. Followed by [facility] and [facility]. Was referred to PT by [facility] - does not want to use.    Prostate Cancer Screening - 21 PSA 0.58  Colon Cancer Screening -  22 FIT Ordered  Osteoporosis Screening - 21 Vitamin D level 58.1    Today's Visit:  Pt here today for routine f/u with labs completed prior to visit, labs discussed with no questions or concerns at this time. The pt verbalized understanding of T2DM dx, he is agreeable to Diabetes Education referral and Fundal Exam Today. The pt reports that he does eat a lot of sugars and starches, will start to work on that. We discussed his diagnoses in relation to his future health, pt understands. Pt meds reviewed and refilled appropriately. Agreeable to FIT test today. Pt aware of upcoming OV with Cardiology Clinic.   Denies chest pain, shortness of breath, cough, fever, headache, dizziness, weakness, abdominal pain, nausea, vomiting, diarrhea, constipation, black/bloody stools, unplanned weight loss, night sweats, changes in urinary patterns, burning/odor with urination, depression, anxiety, and SI/HI.       Follow-up      Review of Systems      Review of Systems   Constitutional: Negative.    HENT: Negative.     Eyes: Negative.    Respiratory: Negative.     Cardiovascular: Negative.    Gastrointestinal: Negative.    Endocrine: Negative.    Genitourinary: Negative.    Neurological: Negative.    Psychiatric/Behavioral: Negative.       Medical / Social / Family History     Past Medical History:   Diagnosis Date    Hypertension     Mixed hyperlipidemia        History reviewed. No pertinent surgical history.    Social History    reports that he has never smoked. He has never used smokeless tobacco. He reports that he does not currently use alcohol. He reports that he does not use drugs.    Family History  's family history includes Heart disease in his father and mother.    Medications and Allergies     Medications  Medication List with Changes/Refills   Current Medications    ACETAMINOPHEN (TYLENOL) 325 MG TABLET    Take 650 mg by mouth.    MENTHOL 3.5 % GEL    Apply topically.   Changed and/or Refilled Medications    Modified Medication Previous Medication    EZETIMIBE (ZETIA) 10 MG TABLET ezetimibe (ZETIA) 10 mg tablet       Take 1 tablet (10 mg total) by mouth every evening. For High Cholesterol    Take 10 mg by mouth.    LISINOPRIL (PRINIVIL,ZESTRIL) 20 MG TABLET lisinopriL (PRINIVIL,ZESTRIL) 20 MG tablet       Take 1 tablet (20 mg total) by mouth every evening. For High Blood Pressure    Take 20 mg by mouth.    LISINOPRIL-HYDROCHLOROTHIAZIDE (PRINZIDE,ZESTORETIC) 20-12.5 MG PER TABLET lisinopriL-hydrochlorothiazide (PRINZIDE,ZESTORETIC) 20-12.5 mg per tablet       Take 1 tablet by mouth once daily. For High Blood Pressure    Take by mouth.    ROSUVASTATIN (CRESTOR) 40 MG TAB rosuvastatin (CRESTOR) 40 MG Tab       Take 1 tablet (40 mg total) by mouth every evening. For High Cholesterol    Take 40 mg by mouth.    TADALAFIL (CIALIS) 20 MG TAB tadalafiL (CIALIS) 20 MG Tab       Take 1 tablet (20 mg total) by mouth daily as needed (Erectile  Dysfunction).    Take 20 mg by mouth.        Allergies  Review of patient's allergies indicates:  No Known Allergies    Physical Examination     Vitals:    09/21/22 0739   BP: 133/80   Pulse: 93   Resp: 16   Temp: 98.3 °F (36.8 °C)     Physical Exam  Constitutional:       Appearance: Normal appearance.   Cardiovascular:      Rate and Rhythm: Normal rate and regular rhythm.      Pulses:           Dorsalis pedis pulses are 2+ on the right side and 2+ on the left side.        Posterior tibial pulses are 2+ on the right side and 2+ on the left side.   Pulmonary:      Effort: Pulmonary effort is normal.      Breath sounds: Normal breath sounds.   Musculoskeletal:         General: Normal range of motion.      Cervical back: Normal range of motion.   Feet:      Right foot:      Protective Sensation: 9 sites tested.  9 sites sensed.      Skin integrity: Skin integrity normal.      Toenail Condition: Right toenails are normal.      Left foot:      Protective Sensation: 9 sites tested.  9 sites sensed.      Skin integrity: Skin integrity normal.      Toenail Condition: Left toenails are normal.   Skin:     General: Skin is warm and dry.   Neurological:      General: No focal deficit present.      Mental Status: He is alert and oriented to person, place, and time.   Psychiatric:         Mood and Affect: Mood normal.         Results     Lab Results   Component Value Date    WBC 6.4 09/19/2022    RBC 5.22 09/19/2022    HGB 14.4 09/19/2022    HCT 45.0 09/19/2022    MCV 86.2 09/19/2022    MCH 27.6 09/19/2022    MCHC 32.0 (L) 09/19/2022    RDW 12.8 09/19/2022     09/19/2022    MPV 9.0 09/19/2022     CMP  Sodium Level   Date Value Ref Range Status   09/19/2022 142 136 - 145 mmol/L Final     Potassium Level   Date Value Ref Range Status   09/19/2022 4.0 3.5 - 5.1 mmol/L Final     Carbon Dioxide   Date Value Ref Range Status   09/19/2022 29 22 - 29 mmol/L Final     Blood Urea Nitrogen   Date Value Ref Range Status   09/19/2022  16.8 8.4 - 25.7 mg/dL Final     Creatinine   Date Value Ref Range Status   09/19/2022 0.80 0.73 - 1.18 mg/dL Final     Calcium Level Total   Date Value Ref Range Status   09/19/2022 9.6 8.4 - 10.2 mg/dL Final     Albumin Level   Date Value Ref Range Status   09/19/2022 3.7 3.5 - 5.0 gm/dL Final     Bilirubin Total   Date Value Ref Range Status   09/19/2022 0.4 <=1.5 mg/dL Final     Alkaline Phosphatase   Date Value Ref Range Status   09/19/2022 42 40 - 150 unit/L Final     Aspartate Aminotransferase   Date Value Ref Range Status   09/19/2022 24 5 - 34 unit/L Final     Alanine Aminotransferase   Date Value Ref Range Status   09/19/2022 35 0 - 55 unit/L Final     Estimated GFR-Non    Date Value Ref Range Status   03/10/2022 >105 >=90      Lab Results   Component Value Date    CHOL 179 09/19/2022     Lab Results   Component Value Date    HDL 36 09/19/2022     No results found for: LDLCALC  Lab Results   Component Value Date    TRIG 156 (H) 09/19/2022     No results found for: CHOLHDL  Lab Results   Component Value Date    TSH 0.9541 09/19/2022     Lab Results   Component Value Date    PHUR 7.0 03/10/2022    PROTEINUA Trace (A) 09/19/2022    GLUCUA Normal 03/10/2022    KETONESU Negative 03/10/2022    OCCULTUA Negative 03/10/2022    NITRITE Negative 03/10/2022    LEUKOCYTESUR Negative 09/19/2022           Assessment and Plan (including Health Maintenance)     Plan:         Health Maintenance Due   Topic Date Due    Hepatitis C Screening  Never done    COVID-19 Vaccine (1) Never done    Eye Exam  Never done    HIV Screening  Never done    Colorectal Cancer Screening  10/18/2018       Problem List Items Addressed This Visit          Cardiac/Vascular    Primary hypertension    Current Assessment & Plan     RX lisinopril 20 mg daily  RX hctz / lisinopril 12.5/ 20 mg daily   Low Sodium Diet (Dash Diet - less than 2 grams of sodium per day).  Maintain healthy weight with goal BMI <30. Exercise 30 minutes per  day 5 days per week.           Relevant Medications    lisinopriL (PRINIVIL,ZESTRIL) 20 MG tablet    lisinopriL-hydrochlorothiazide (PRINZIDE,ZESTORETIC) 20-12.5 mg per tablet    Mixed hyperlipidemia    Current Assessment & Plan     RX rosuvastatin 40 mg q hs   Follow a low cholesterol, low saturated fat diet with less than 200 mg of cholesterol a day.   Avoid fried foods and high saturated fats (clinton, sausage, cookies, cakes, chips, cheese, whole milk, butter, mayonnaise, creamy dressings, gravy, stew, gumbo, boudin, cracklins and cream sauces).  Add flax seed or fish oil supplements to diet.   Increase dietary fiber.   Regular exercise improves cholesterol levels.  Physical activity 5 times a week for 30 minutes per day (or 150 minutes per week).   Stressed importance of dietary modifications.           Relevant Medications    ezetimibe (ZETIA) 10 mg tablet    rosuvastatin (CRESTOR) 40 MG Tab       Renal/    Erectile dysfunction    Relevant Medications    tadalafiL (CIALIS) 20 MG Tab       Endocrine    Vitamin D deficiency    Current Assessment & Plan     Educated on increasing foods high in Vitamin D such as fish oil, cod liver oil, salmon, milk fortified with vitamin D.   Vitamin D 2000 I.U. tablets daily (purchase over the counter).  Repeat Vitamin D level as ordered.           Type 2 diabetes mellitus without complication, without long-term current use of insulin - Primary    Current Assessment & Plan     Referral to Diabetes Education   Follow ADA diet.  Avoid soda, simple sweets, and limit rice/pasta/bread/starches and consume brown options when possible.   Maintain healthy weight with BMI goal <30.   Perform aerobic exercise for 150 minutes per week (or 5 days a week for 30 minutes each day).   Examine feet daily.            Relevant Orders    Diabetic Eye Screening Photo    Urinalysis, Reflex to Urine Culture Urine, Clean Catch    Microalbumin/Creatinine Ratio, Urine    Basic Metabolic Panel     Hemoglobin A1C    Ambulatory referral/consult to Diabetes Education     Other Visit Diagnoses       Immunization due        Relevant Orders    Influenza - Quadrivalent *Preferred* (6 months+) (PF) (Completed)    Colon cancer screening        Relevant Orders    Occult Blood, Fecal Immunoassay    Need for hepatitis C screening test        Relevant Orders    Hepatitis C Antibody            Health Maintenance Topics with due status: Not Due       Topic Last Completion Date    TETANUS VACCINE 03/12/2019    Lipid Panel 09/19/2022    Foot Exam 09/21/2022       Future Appointments   Date Time Provider Department Center   9/21/2022  8:15 AM DIABETIC EYE [facility], [facility] INTERNAL MEDICINE RESIDENTS [facility] IM RES [place] [facility]   10/4/2022 10:45 AM LEXI Hooks [facility] CARD [facility]   12/21/2022 12:45 PM FANG Harkins [facility] INTMED [facility]            Signature:     [facility]  [address] [facility] [address]    Date of encounter: 9/21/22

## 2022-09-21 NOTE — PROGRESS NOTES
Jesus Martinez is a 57 y.o. male here for a diabetic eye screening with non-dilated fundus photos per FANG Stevenson.    Patient cooperative?: Yes  Small pupils?: No  Last eye exam: unknown    For exam results, see Encounter Report.

## 2022-09-21 NOTE — ASSESSMENT & PLAN NOTE
Educated on increasing foods high in Vitamin D such as fish oil, cod liver oil, salmon, milk fortified with vitamin D.   Vitamin D 2000 I.U. tablets daily (purchase over the counter).  Repeat Vitamin D level as ordered.     2 = assistive person

## 2022-09-29 ENCOUNTER — CLINICAL SUPPORT (OUTPATIENT)
Dept: DIABETES | Facility: CLINIC | Age: 57
End: 2022-09-29
Payer: COMMERCIAL

## 2022-09-29 VITALS — WEIGHT: 297 LBS | BODY MASS INDEX: 47.94 KG/M2

## 2022-09-29 DIAGNOSIS — E11.9 TYPE 2 DIABETES MELLITUS WITHOUT COMPLICATION, WITHOUT LONG-TERM CURRENT USE OF INSULIN: ICD-10-CM

## 2022-09-29 PROCEDURE — G0108 DIAB MANAGE TRN  PER INDIV: HCPCS | Mod: PBBFAC | Performed by: DIETITIAN, REGISTERED

## 2022-09-29 PROCEDURE — 99213 OFFICE O/P EST LOW 20 MIN: CPT | Mod: PBBFAC | Performed by: DIETITIAN, REGISTERED

## 2022-09-29 NOTE — PROGRESS NOTES
Diabetes Care Specialist Progress Note  Author: Kylee Veronica RD  Date: 9/29/2022    Program Intake  Reason for Diabetes Program Visit:: Initial Diabetes Assessment  Current diabetes risk level:: low    Lab Results   Component Value Date    HGBA1C 6.6 09/19/2022       Clinical    Patient Health Rating  Compared to other people your age, how would you rate your health?: Good    Problem Review  Reviewed Problem List with Patient: yes  Active comorbidities affecting diabetes self-care.: yes  Comorbidities: Hypertension  Reviewed health maintenance: yes    Clinical Assessment  Current Diabetes Treatment:  (Not on DM meds)  Have you ever experienced hypoglycemia (low blood sugar)?: yes  Are you able to tell when your blood sugar is low?: Yes  What symptoms do you experience?: Shaky  Have you ever been hospitalized because your blood sugar was too low?: no  How do you treat hypoglycemia (low blood sugar)?: 5-6 pieces of hard candy    Medication Information  Medication adherence impacting ability to self-manage diabetes?: No    Labs  Do you have regular lab work to monitor your medications?: Yes  Type of Regular Lab Work: A1c  Where do you get your labs drawn?: [facility]  Lab Compliance Barriers: No    Nutritional Status  Meal Plan 24 Hour Recall: Breakfast  Meal Plan 24 Hour Recall - Breakfast: egg whites and oatmeal with butter, milk and Splenda  Current nutritional status an area of need that is impacting patient's ability to self-manage diabetes?: No    Additional Social History    Support  Does anyone support you with your diabetes care?: yes  Who supports you?: spouse  Who takes you to your medical appointments?: self  Does the current support meet the patient's needs?: Yes  Is Support an area impacting ability to self-manage diabetes?: No         Cognitive/Behavioral Health  Alert and Oriented: Yes  Difficulty Thinking: No  Requires Prompting: No  Requires assistance for routine expression?: No  Cognitive or  behavioral barriers impacting ability to self-manage diabetes?: No         Communication  Language preference: English    Health Literacy  Preferred Learning Method: Reading Materials  How often do you need to have someone help you read instructions, pamphlets, or written material from your doctor or pharmacy?: Never  Health literacy needs impacting ability to self-manage diabetes?: No      Diabetes Self-Management Skills Assessment    Diabetes Disease Process/Treatment Options  Patient/caregiver able to state what happens when someone has diabetes.: yes  Patient/caregiver knows what type of diabetes they have.: no  Diabetes Disease Process/Treatment Options: Skills Assessment Completed: Yes  Assessment indicates:: Adequate understanding  Area of need?: No    Nutrition/Healthy Eating  Method of carbohydrate measurement:: no method  Patient can identify foods that impact blood sugar.: yes  Patient-identified foods:: starchy vegetables (corn, peas, beans), starches (bread, pasta, rice, cereal), soda  Nutrition/Healthy Eating Skills Assessment Completed:: Yes  Assessment indicates:: Instruction Needed  Area of need?: Yes    Physical Activity/Exercise  Patient's daily activity level:: constantly moving  Patient formally exercises outside of work.: no  Patient can identify forms of physical activity.: yes  Stated forms of physical activity:: manual activity at work, other (see comments) (walking and lifting weights)  Patient can identify reasons why exercise/physical activity is important in diabetes management.: no  Physical Activity/Exercise Skills Assessment Completed: : Yes  Assessment indicates:: Instruction Needed  Area of need?: Yes    Medications  Medication Skills Assessment Completed:: No  Deffered due to:: Other (comment) (not currently taking DM meds)  Area of need?: No    Home Blood Glucose Monitoring  Patient states that blood sugar is checked at home daily.: no  Home Blood Glucose Monitoring Skills  Assessment Completed: : No  Deferred due to::  (Does not have orders to check blood glucose)  Area of need?: No    Acute Complications  Patient is able to identify types of acute complications: No  Acute Complications Skills Assessment Completed: : Yes  Assessment indicates:: Instruction Needed  Area of need?: Yes    Chronic Complications  Patient can identify major chronic complications of diabetes.: yes  Stated chronic complications:: neuropathy/nerve damage  Patient can identify ways to prevent or delay diabetes complications.: yes  Stated ways to prevent complications:: having regular diabetic eye exams, avoiding smoking and other types of tobacco, healthy eating and regular activity  Patient is aware that having diabetes increases risk of heart disease?: Yes  Patient is taking statin?: Yes  Chronic Complications Skills Assessment Completed: : Yes  Assessment indicates:: Adequate understanding  Area of need?: No    Psychosocial/Coping  Patient can identify ways of coping with chronic disease.: yes  Patient-stated ways of coping with chronic disease:: support from loved ones, spiritual/[employer] practices  Psychosocial/Coping Skills Assessment Completed: : Yes  Assessment indicates:: Adequate understanding  Area of need?: No      Diabetes Self Support Plan         Assessment Summary and Plan    Based on today's diabetes care assessment, the following areas of need were identified:      Social 9/29/2022   Support No   Cognitive/Behavioral Health No   Health Literacy No        Clinical 9/29/2022   Medication Adherence No   Lab Compliance No   Nutritional Status No        Diabetes Self-Management Skills 9/29/2022   Diabetes Disease Process/Treatment Options No   Nutrition/Healthy Eating Yes - Reviewed the sources and role of Carbohydrate, Protein, and Fat and how each nutrient impact blood sugar. Reviewed label reading for Total Carbohydrates and how sugars impact total carbohydrates. Reviewed serving sizes of  "starches, milk, fruit, starchy vegetables and snacks. Reviewed non-starchy vegetable list. Provided meal-planning instruction via foodlists, plate method and measuring cups. Discussed noncaloric, sugar free drink options.  Provided sample menus and snack ideas.    Physical Activity/Exercise Yes - see care plan   Medication No   Home Blood Glucose Monitoring No   Acute Complications Yes - Reviewed "Rule of 15". See care plan   Chronic Complications No   Psychosocial/Coping No          Today's interventions were provided through individual discussion, instruction, and written materials were provided.  Pt is interested in learning how to stay off DM medication. Currently not engaging in regular exercise. He has taken steps with his diet to limit added sugar such as drinking Sprite Zero, water and using Splenda. He has also started using flaxseed and fish oil and well as olive/canola oil for cooking. He is currently not eating much non-starchy vegetables. Reviewed non-starchy vegetable list and pt able to identify preferences. Discussed cooking methods and ideas to incorporated into meals. He likes to cook. Reviewed What is Diabetes, A1C levels and target goals, diabetes complications, health maintenance plan, reducing risks, label reading and carbohydrate education. Discussed foot care.    Patient verbalized understanding of instruction and written materials.  Pt was able to return back demonstration of instructions today. Patient understood key points, needs reinforcement and further instruction.     Diabetes Self-Management Care Plan:    Today's Diabetes Self-Management Care Plan was developed with Jesus's input. Jesus has agreed to work toward the following goal(s) to improve his/her overall diabetes control.      Care Plan: Diabetes Management   Updates made since 8/30/2022 12:00 AM        Problem: Physical Activity and Exercise         Goal: Patient agrees to increase physical activity to a goal of 3-4 times " per week for 1.5 miles.    Start Date: 9/29/2022   Priority: High   Barriers: No Barriers Identified   Note:    9/29/22 - Pt works in [employer] and [place] next to a park. Pt stated goal is to take a .5 mile walk for his lunch break. He intends to also walk 1 mile after work.       Task: Discussed role of physical activity on reducing insulin resistance and improvement in overall glycemic control. Completed 9/29/2022        Task: Reviewed monitoring for symptoms of hypoglycemia and carrying peppermints while exercising Completed 9/29/2022          Follow Up Plan     Follow up if symptoms worsen or fail to improve. Pt will follow-up with PCP and contact clinic for follow-up as needed.    Today's care plan and follow up schedule was discussed with patient.  Jesus verbalized understanding of the care plan, goals, and agrees to follow up plan.        The patient was encouraged to communicate with his/her health care provider/physician and care team regarding his/her condition(s) and treatment.  I provided the patient with my contact information today and encouraged to contact me via phone or [facility] Patient Portal as needed.     Length of Visit   Total Time: 30 Minutes

## 2022-10-04 ENCOUNTER — OFFICE VISIT (OUTPATIENT)
Dept: CARDIOLOGY | Facility: CLINIC | Age: 57
End: 2022-10-04
Payer: COMMERCIAL

## 2022-10-04 VITALS
SYSTOLIC BLOOD PRESSURE: 138 MMHG | BODY MASS INDEX: 47.06 KG/M2 | HEIGHT: 67 IN | OXYGEN SATURATION: 99 % | TEMPERATURE: 98 F | HEART RATE: 62 BPM | WEIGHT: 299.81 LBS | RESPIRATION RATE: 18 BRPM | DIASTOLIC BLOOD PRESSURE: 86 MMHG

## 2022-10-04 DIAGNOSIS — M79.604 BILATERAL LOWER EXTREMITY PAIN: ICD-10-CM

## 2022-10-04 DIAGNOSIS — I10 PRIMARY HYPERTENSION: Primary | ICD-10-CM

## 2022-10-04 DIAGNOSIS — R06.09 DOE (DYSPNEA ON EXERTION): ICD-10-CM

## 2022-10-04 DIAGNOSIS — I25.10 MILD CAD: ICD-10-CM

## 2022-10-04 DIAGNOSIS — E78.2 MIXED HYPERLIPIDEMIA: ICD-10-CM

## 2022-10-04 DIAGNOSIS — R60.0 PERIPHERAL EDEMA: ICD-10-CM

## 2022-10-04 DIAGNOSIS — E66.01 MORBID OBESITY: ICD-10-CM

## 2022-10-04 DIAGNOSIS — M79.605 BILATERAL LOWER EXTREMITY PAIN: ICD-10-CM

## 2022-10-04 PROCEDURE — 3008F PR BODY MASS INDEX (BMI) DOCUMENTED: ICD-10-PCS | Mod: CPTII,,, | Performed by: NURSE PRACTITIONER

## 2022-10-04 PROCEDURE — 99214 PR OFFICE/OUTPT VISIT, EST, LEVL IV, 30-39 MIN: ICD-10-PCS | Mod: S$PBB,,, | Performed by: NURSE PRACTITIONER

## 2022-10-04 PROCEDURE — 4010F ACE/ARB THERAPY RXD/TAKEN: CPT | Mod: CPTII,,, | Performed by: NURSE PRACTITIONER

## 2022-10-04 PROCEDURE — 1160F PR REVIEW ALL MEDS BY PRESCRIBER/CLIN PHARMACIST DOCUMENTED: ICD-10-PCS | Mod: CPTII,,, | Performed by: NURSE PRACTITIONER

## 2022-10-04 PROCEDURE — 3075F SYST BP GE 130 - 139MM HG: CPT | Mod: CPTII,,, | Performed by: NURSE PRACTITIONER

## 2022-10-04 PROCEDURE — 99214 OFFICE O/P EST MOD 30 MIN: CPT | Mod: S$PBB,,, | Performed by: NURSE PRACTITIONER

## 2022-10-04 PROCEDURE — 1160F RVW MEDS BY RX/DR IN RCRD: CPT | Mod: CPTII,,, | Performed by: NURSE PRACTITIONER

## 2022-10-04 PROCEDURE — 99214 OFFICE O/P EST MOD 30 MIN: CPT | Mod: PBBFAC | Performed by: NURSE PRACTITIONER

## 2022-10-04 PROCEDURE — 1159F PR MEDICATION LIST DOCUMENTED IN MEDICAL RECORD: ICD-10-PCS | Mod: CPTII,,, | Performed by: NURSE PRACTITIONER

## 2022-10-04 PROCEDURE — 1159F MED LIST DOCD IN RCRD: CPT | Mod: CPTII,,, | Performed by: NURSE PRACTITIONER

## 2022-10-04 PROCEDURE — 3008F BODY MASS INDEX DOCD: CPT | Mod: CPTII,,, | Performed by: NURSE PRACTITIONER

## 2022-10-04 PROCEDURE — 4010F PR ACE/ARB THEARPY RXD/TAKEN: ICD-10-PCS | Mod: CPTII,,, | Performed by: NURSE PRACTITIONER

## 2022-10-04 PROCEDURE — 3079F PR MOST RECENT DIASTOLIC BLOOD PRESSURE 80-89 MM HG: ICD-10-PCS | Mod: CPTII,,, | Performed by: NURSE PRACTITIONER

## 2022-10-04 PROCEDURE — 3079F DIAST BP 80-89 MM HG: CPT | Mod: CPTII,,, | Performed by: NURSE PRACTITIONER

## 2022-10-04 PROCEDURE — 3075F PR MOST RECENT SYSTOLIC BLOOD PRESS GE 130-139MM HG: ICD-10-PCS | Mod: CPTII,,, | Performed by: NURSE PRACTITIONER

## 2022-10-19 LAB — MAYO GENERIC ORDERABLE RESULT: NORMAL

## 2022-10-20 ENCOUNTER — TELEPHONE (OUTPATIENT)
Dept: INTERNAL MEDICINE | Facility: CLINIC | Age: 57
End: 2022-10-20
Payer: COMMERCIAL

## 2022-10-20 NOTE — TELEPHONE ENCOUNTER
----- Message from FANG Harkins sent at 10/19/2022 11:17 AM CDT -----  Please call patient and inform them that I have received there FIT test results for colon cancer screening and it was negative. We will repeat this annual screening next year.    Thanks,  FANG Stevenson

## 2022-10-25 NOTE — PROGRESS NOTES
CHIEF COMPLAINT:   Chief Complaint   Patient presents with    8mt f/u     Pt denies CV complaints since last visit.                   Review of patient's allergies indicates:  No Known Allergies                                       HPI:  Jesus Martinez 57 y.o. male with a past medical history CAD, HLD, HTN, Prediabetes, and morbid obesity presents for 8 month follow up and ongoing care.  Patient denies chest pain, palpitations, orthopnea, PND, or syncope.  He does endorse shortness of breath with overexertion as well as increased bilateral lower extremity edema towards the end of the day.  He reports bilateral lower extremity pain.  He reports compliance with his medications.  He states the systolic blood pressure at home runs around 130 and the diastolic blood pressure runs in the 70s.    Left heart cath September 2018:  Mild CAD  Normal LVEDP  Recommendations:  Medical management  Aggressive risk factor management                                                                                                                                                                                                                                                       Patient Active Problem List   Diagnosis    Coronary arteriosclerosis in native artery    Erectile dysfunction    Primary hypertension    Microscopic hematuria    Mixed hyperlipidemia    Morbid obesity    Plantar fasciitis    Vitamin D deficiency    Type 2 diabetes mellitus without complication, without long-term current use of insulin     Past Surgical History:   Procedure Laterality Date    CARDIAC CATHETERIZATION      2018     Social History     Socioeconomic History    Marital status:    Tobacco Use    Smoking status: Former     Packs/day: 0.25     Years: 15.00     Pack years: 3.75     Types: Cigarettes    Smokeless tobacco: Never   Substance and Sexual Activity    Alcohol use: Not Currently    Drug use: Never    Sexual activity: Yes     Social  There was no evidence of ulcer Determinants of Health     Physical Activity: Inactive    Days of Exercise per Week: 0 days    Minutes of Exercise per Session: 0 min   Stress: Stress Concern Present    Feeling of Stress : To some extent   Social Connections: Unknown    Frequency of Communication with Friends and Family: More than three times a week    Frequency of Social Gatherings with Friends and Family: More than three times a week    Attends Holiness Services: More than 4 times per year    Marital Status:         Family History   Problem Relation Age of Onset    Heart disease Mother     Heart disease Father          Current Outpatient Medications:     ezetimibe (ZETIA) 10 mg tablet, Take 1 tablet (10 mg total) by mouth every evening. For High Cholesterol, Disp: 90 tablet, Rfl: 1    lisinopriL (PRINIVIL,ZESTRIL) 20 MG tablet, Take 1 tablet (20 mg total) by mouth every evening. For High Blood Pressure, Disp: 90 tablet, Rfl: 1    lisinopriL-hydrochlorothiazide (PRINZIDE,ZESTORETIC) 20-12.5 mg per tablet, Take 1 tablet by mouth once daily. For High Blood Pressure, Disp: 90 tablet, Rfl: 1    menthol 3.5 % Gel, Apply topically., Disp: , Rfl:     rosuvastatin (CRESTOR) 40 MG Tab, Take 1 tablet (40 mg total) by mouth every evening. For High Cholesterol, Disp: 90 tablet, Rfl: 1    tadalafiL (CIALIS) 20 MG Tab, Take 1 tablet (20 mg total) by mouth daily as needed (Erectile Dysfunction)., Disp: 10 tablet, Rfl: 6    acetaminophen (TYLENOL) 325 MG tablet, Take 650 mg by mouth., Disp: , Rfl:      ROS:                                                                                                                                                                             Review of Systems   Constitutional: Negative.    Respiratory:  Positive for shortness of breath.    Cardiovascular:  Positive for leg swelling.   Gastrointestinal: Negative.    Musculoskeletal: Negative.    Skin: Negative.    Neurological: Negative.    Psychiatric/Behavioral:  "Negative.        Blood pressure 138/86, pulse 62, temperature 98.1 °F (36.7 °C), resp. rate 18, height 5' 6.93" (1.7 m), weight 136 kg (299 lb 13.2 oz), SpO2 99 %.   PE:  Physical Exam  Constitutional:       Appearance: Normal appearance.   HENT:      Head: Normocephalic.   Eyes:      Extraocular Movements: Extraocular movements intact.   Cardiovascular:      Rate and Rhythm: Normal rate and regular rhythm.   Pulmonary:      Effort: Pulmonary effort is normal.   Abdominal:      Palpations: Abdomen is soft.   Musculoskeletal:         General: Normal range of motion.      Cervical back: Neck supple.      Right lower leg: Edema present.      Left lower leg: Edema present.   Skin:     General: Skin is warm and dry.   Neurological:      General: No focal deficit present.      Mental Status: He is alert and oriented to person, place, and time.   Psychiatric:         Mood and Affect: Mood normal.      ASSESSMENT/PLAN:  Mild CAD per [facility] September 2018  Denies chest pain  Continue Aspirin, Crestor, Zetia, and Lisinopril  Counseled on the importance of diet and exercise     SANCHEZ  Will order Echocardiogram    HTN  Continue HCTZ-Lisinopril and Lisinopril 20mg (total of 40mg of Lisinopril daily)  He states his systolic blood pressure at home runs around 130 and the diastolic blood pressure runs in the 70s.  Counseled on low salt diet and exercise    HLD  LDL not at goal - 112  Continue Crestor 40mg daily and Zetia 10mg daily  Strongly encouraged a heart healthy diet and exercise     Obesity  Counseled on the importance of weight loss through diet and exercise     Peripheral edema/bilateral lower extremity pain  Will order SULLY testing  Encourage low-sodium diet and leg elevation when able       Echocardiogram   SULLY testing  Follow-up in cardiology clinic in 4 months    "

## 2022-10-28 ENCOUNTER — TELEPHONE (OUTPATIENT)
Dept: INTERNAL MEDICINE | Facility: CLINIC | Age: 57
End: 2022-10-28

## 2022-10-28 NOTE — TELEPHONE ENCOUNTER
Pt called and stated that he will not be doing his ultrasound of his leg because, he will have to pay out of pocket and at the moment he does not have the money to pay for it.

## 2022-12-15 ENCOUNTER — LAB VISIT (OUTPATIENT)
Dept: LAB | Facility: HOSPITAL | Age: 57
End: 2022-12-15
Attending: NURSE PRACTITIONER
Payer: COMMERCIAL

## 2022-12-15 DIAGNOSIS — E11.9 TYPE 2 DIABETES MELLITUS WITHOUT COMPLICATION, WITHOUT LONG-TERM CURRENT USE OF INSULIN: ICD-10-CM

## 2022-12-15 DIAGNOSIS — Z11.59 NEED FOR HEPATITIS C SCREENING TEST: ICD-10-CM

## 2022-12-15 LAB
ANION GAP SERPL CALC-SCNC: 9 MEQ/L
BUN SERPL-MCNC: 14.3 MG/DL (ref 8.4–25.7)
CALCIUM SERPL-MCNC: 9.4 MG/DL (ref 8.4–10.2)
CHLORIDE SERPL-SCNC: 105 MMOL/L (ref 98–107)
CO2 SERPL-SCNC: 26 MMOL/L (ref 22–29)
CREAT SERPL-MCNC: 0.81 MG/DL (ref 0.73–1.18)
CREAT/UREA NIT SERPL: 18
EST. AVERAGE GLUCOSE BLD GHB EST-MCNC: 137 MG/DL
GFR SERPLBLD CREATININE-BSD FMLA CKD-EPI: >60 MLS/MIN/1.73/M2
GLUCOSE SERPL-MCNC: 112 MG/DL (ref 74–100)
HBA1C MFR BLD: 6.4 %
HCV AB SERPL QL IA: NONREACTIVE
POTASSIUM SERPL-SCNC: 4 MMOL/L (ref 3.5–5.1)
SODIUM SERPL-SCNC: 140 MMOL/L (ref 136–145)

## 2022-12-15 PROCEDURE — 83036 HEMOGLOBIN GLYCOSYLATED A1C: CPT

## 2022-12-15 PROCEDURE — 80048 BASIC METABOLIC PNL TOTAL CA: CPT

## 2022-12-15 PROCEDURE — 36415 COLL VENOUS BLD VENIPUNCTURE: CPT

## 2022-12-15 PROCEDURE — 86803 HEPATITIS C AB TEST: CPT

## 2022-12-20 NOTE — PROGRESS NOTES
FANG Harkins   [facility]  [address] [address]      PATIENT NAME: Jesus Martinez  : 1965  DATE: 22  MRN: [id number]      Patient PCP Information       Provider PCP Type    FANG Harkins [facility]            Reason for Visit / Chief Complaint: Follow-up (Lab review )       History of Present Illness / Problem Focused Workflow     Jesus Martinez presents to the clinic with Follow-up (Lab review )        58 yo AAM here today for f/u  PMHx includes mild CAD, HLD, HTN, T2DM, Vitamin D deficiency, right knee pain, and ED. Former smoker.  <10 pack/yr history of smoking. Followed by [facility] and [facility]. Was referred to PT by [facility] - does not want to use.    Prostate Cancer Screening - 22 PSA 1.91  Colon Cancer Screening -  22 Cologuard Ordered  Osteoporosis Screening - 22 Vitamin D level 42.2  HCV Screenin/15/22 Negative    22  Pt here today for routine f/u with labs completed prior to visit, labs discussed with no questions or concerns at this time. The pt verbalized understanding of T2DM dx, he is agreeable to Diabetes Education referral and Fundal Exam Today. The pt reports that he does eat a lot of sugars and starches, will start to work on that. We discussed his diagnoses in relation to his future health, pt understands. Pt meds reviewed and refilled appropriately. Agreeable to FIT test today. Pt aware of upcoming OV with Cardiology Clinic.     22  Pt here today via audio virtual for f/u for T2DM, A1C WNL today, will continue lifestyle medications / diet modifications. Will f/u in 3 months for routine f/u with wellness labs.  Denies any other issues concerns today.   Denies chest pain, shortness of breath, cough, fever, headache, dizziness, weakness, abdominal pain, nausea, vomiting, diarrhea, constipation, black/bloody stools, unplanned weight loss, night sweats, changes in  urinary patterns, burning/odor with urination, depression, anxiety, and SI/HI.         Review of Systems     Review of Systems   Constitutional: Negative.    HENT: Negative.     Eyes: Negative.    Respiratory: Negative.     Cardiovascular: Negative.    Gastrointestinal: Negative.    Endocrine: Negative.    Genitourinary: Negative.    Neurological: Negative.    Psychiatric/Behavioral: Negative.           Medications and Allergies     Medications  Medication List with Changes/Refills   Current Medications    ACETAMINOPHEN (TYLENOL) 325 MG TABLET    Take 650 mg by mouth.    EZETIMIBE (ZETIA) 10 MG TABLET    Take 1 tablet (10 mg total) by mouth every evening. For High Cholesterol    LISINOPRIL (PRINIVIL,ZESTRIL) 20 MG TABLET    Take 1 tablet (20 mg total) by mouth every evening. For High Blood Pressure    LISINOPRIL-HYDROCHLOROTHIAZIDE (PRINZIDE,ZESTORETIC) 20-12.5 MG PER TABLET    Take 1 tablet by mouth once daily. For High Blood Pressure    MENTHOL 3.5 % GEL    Apply topically.    ROSUVASTATIN (CRESTOR) 40 MG TAB    Take 1 tablet (40 mg total) by mouth every evening. For High Cholesterol    TADALAFIL (CIALIS) 20 MG TAB    Take 1 tablet (20 mg total) by mouth daily as needed (Erectile Dysfunction).        Allergies  Review of patient's allergies indicates:  No Known Allergies    Physical Examination   There were no vitals filed for this visit.  Physical Exam  HENT:      Right Ear: Hearing normal.      Left Ear: Hearing normal.   Neurological:      Mental Status: He is alert and oriented to person, place, and time.   Psychiatric:         Mood and Affect: Mood normal.         Results     Lab Results   Component Value Date    WBC 6.4 09/19/2022    RBC 5.22 09/19/2022    HGB 14.4 09/19/2022    HCT 45.0 09/19/2022    MCV 86.2 09/19/2022    MCH 27.6 09/19/2022    MCHC 32.0 (L) 09/19/2022    RDW 12.8 09/19/2022     09/19/2022    MPV 9.0 09/19/2022     CMP  Sodium Level   Date Value Ref Range Status   12/15/2022 140 136  - 145 mmol/L Final     Potassium Level   Date Value Ref Range Status   12/15/2022 4.0 3.5 - 5.1 mmol/L Final     Carbon Dioxide   Date Value Ref Range Status   12/15/2022 26 22 - 29 mmol/L Final     Blood Urea Nitrogen   Date Value Ref Range Status   12/15/2022 14.3 8.4 - 25.7 mg/dL Final     Creatinine   Date Value Ref Range Status   12/15/2022 0.81 0.73 - 1.18 mg/dL Final     Calcium Level Total   Date Value Ref Range Status   12/15/2022 9.4 8.4 - 10.2 mg/dL Final     Albumin Level   Date Value Ref Range Status   09/19/2022 3.7 3.5 - 5.0 gm/dL Final     Bilirubin Total   Date Value Ref Range Status   09/19/2022 0.4 <=1.5 mg/dL Final     Alkaline Phosphatase   Date Value Ref Range Status   09/19/2022 42 40 - 150 unit/L Final     Aspartate Aminotransferase   Date Value Ref Range Status   09/19/2022 24 5 - 34 unit/L Final     Alanine Aminotransferase   Date Value Ref Range Status   09/19/2022 35 0 - 55 unit/L Final     Estimated GFR-Non    Date Value Ref Range Status   03/10/2022 >105 >=90      Lab Results   Component Value Date    CHOL 179 09/19/2022     Lab Results   Component Value Date    HDL 36 09/19/2022     No results found for: LDLCALC  Lab Results   Component Value Date    TRIG 156 (H) 09/19/2022     No results found for: CHOLHDL  Lab Results   Component Value Date    TSH 0.9541 09/19/2022     Lab Results   Component Value Date    PHUR 7.0 03/10/2022    PROTEINUA Negative 12/15/2022    GLUCUA Normal 03/10/2022    KETONESU Negative 03/10/2022    OCCULTUA Negative 03/10/2022    NITRITE Negative 03/10/2022    LEUKOCYTESUR Negative 12/15/2022           Assessment and Plan (including Health Maintenance)     Plan:       Problem List Items Addressed This Visit          Cardiac/Vascular    Primary hypertension    Relevant Orders    CBC Auto Differential    Mixed hyperlipidemia    Relevant Orders    Comprehensive Metabolic Panel    Lipid Panel       Endocrine    Type 2 diabetes mellitus without  complication, without long-term current use of insulin - Primary    Current Assessment & Plan     Follow ADA diet.  Avoid soda, simple sweets, and limit rice/pasta/bread/starches and consume brown options when possible.   Maintain healthy weight with BMI goal <30.   Perform aerobic exercise for 150 minutes per week (or 5 days a week for 30 minutes each day).   Examine feet daily.            Relevant Orders    Comprehensive Metabolic Panel    Urinalysis, Reflex to Urine Culture Urine, Clean Catch    Microalbumin/Creatinine Ratio, Urine    Hemoglobin A1C     Other Visit Diagnoses       Colon cancer screening        Relevant Orders    Cologuard Screening (Multitarget Stool DNA)              Future Appointments   Date Time Provider Department Center   2/6/2023 11:15 AM [facility]               Signature:     [facility]  [address] [facility] [address]    Date of encounter: 12/21/22    Established Patient - Audio Only Telehealth Visit     The patient location is: home  The chief complaint leading to consultation is: lab f/u  Visit type: Virtual visit with audio only (telephone)  Total time spent with patient: 5 mins       The reason for the audio only service rather than synchronous audio and video virtual visit was related to technical difficulties or patient preference/necessity.     Each patient to whom I provide medical services by telemedicine is:  (1) informed of the relationship between the physician and patient and the respective role of any other health care provider with respect to management of the patient; and (2) notified that they may decline to receive medical services by telemedicine and may withdraw from such care at any time. Patient verbally consented to receive this service via voice-only telephone call.     This service was not originating from a related E/M service provided within the previous 7  days nor will  to an E/M service or procedure within the next 24 hours or my soonest available appointment.  Prevailing standard of care was able to be met in this audio-only visit.

## 2022-12-21 ENCOUNTER — OFFICE VISIT (OUTPATIENT)
Dept: INTERNAL MEDICINE | Facility: CLINIC | Age: 57
End: 2022-12-21
Payer: COMMERCIAL

## 2022-12-21 DIAGNOSIS — Z12.11 COLON CANCER SCREENING: ICD-10-CM

## 2022-12-21 DIAGNOSIS — E78.2 MIXED HYPERLIPIDEMIA: ICD-10-CM

## 2022-12-21 DIAGNOSIS — I10 PRIMARY HYPERTENSION: ICD-10-CM

## 2022-12-21 DIAGNOSIS — E11.9 TYPE 2 DIABETES MELLITUS WITHOUT COMPLICATION, WITHOUT LONG-TERM CURRENT USE OF INSULIN: Primary | ICD-10-CM

## 2022-12-21 PROCEDURE — 1160F PR REVIEW ALL MEDS BY PRESCRIBER/CLIN PHARMACIST DOCUMENTED: ICD-10-PCS | Mod: CPTII,95,, | Performed by: NURSE PRACTITIONER

## 2022-12-21 PROCEDURE — 3066F NEPHROPATHY DOC TX: CPT | Mod: CPTII,95,, | Performed by: NURSE PRACTITIONER

## 2022-12-21 PROCEDURE — 99213 OFFICE O/P EST LOW 20 MIN: CPT | Mod: 95,,, | Performed by: NURSE PRACTITIONER

## 2022-12-21 PROCEDURE — 4010F PR ACE/ARB THEARPY RXD/TAKEN: ICD-10-PCS | Mod: CPTII,95,, | Performed by: NURSE PRACTITIONER

## 2022-12-21 PROCEDURE — 3061F NEG MICROALBUMINURIA REV: CPT | Mod: CPTII,95,, | Performed by: NURSE PRACTITIONER

## 2022-12-21 PROCEDURE — 1159F MED LIST DOCD IN RCRD: CPT | Mod: CPTII,95,, | Performed by: NURSE PRACTITIONER

## 2022-12-21 PROCEDURE — 4010F ACE/ARB THERAPY RXD/TAKEN: CPT | Mod: CPTII,95,, | Performed by: NURSE PRACTITIONER

## 2022-12-21 PROCEDURE — 1160F RVW MEDS BY RX/DR IN RCRD: CPT | Mod: CPTII,95,, | Performed by: NURSE PRACTITIONER

## 2022-12-21 PROCEDURE — 3066F PR DOCUMENTATION OF TREATMENT FOR NEPHROPATHY: ICD-10-PCS | Mod: CPTII,95,, | Performed by: NURSE PRACTITIONER

## 2022-12-21 PROCEDURE — 99213 PR OFFICE/OUTPT VISIT, EST, LEVL III, 20-29 MIN: ICD-10-PCS | Mod: 95,,, | Performed by: NURSE PRACTITIONER

## 2022-12-21 PROCEDURE — 3061F PR NEG MICROALBUMINURIA RESULT DOCUMENTED/REVIEW: ICD-10-PCS | Mod: CPTII,95,, | Performed by: NURSE PRACTITIONER

## 2022-12-21 PROCEDURE — 1159F PR MEDICATION LIST DOCUMENTED IN MEDICAL RECORD: ICD-10-PCS | Mod: CPTII,95,, | Performed by: NURSE PRACTITIONER

## 2023-01-09 LAB — NONINV COLON CA DNA+OCC BLD SCRN STL QL: NEGATIVE

## 2023-01-10 ENCOUNTER — TELEPHONE (OUTPATIENT)
Dept: INTERNAL MEDICINE | Facility: CLINIC | Age: 58
End: 2023-01-10
Payer: COMMERCIAL

## 2023-01-10 NOTE — TELEPHONE ENCOUNTER
----- Message from FANG Harkins sent at 1/10/2023  7:50 AM CST -----  Please call patient and inform them that I have received there Cologuard test results for colon cancer screening and it was negative. We will repeat this screening in 3 years as recommended.    Thanks,  FANG Stevenson

## 2023-03-16 ENCOUNTER — LAB VISIT (OUTPATIENT)
Dept: LAB | Facility: HOSPITAL | Age: 58
End: 2023-03-16
Attending: NURSE PRACTITIONER
Payer: COMMERCIAL

## 2023-03-16 DIAGNOSIS — I10 PRIMARY HYPERTENSION: ICD-10-CM

## 2023-03-16 DIAGNOSIS — E11.9 TYPE 2 DIABETES MELLITUS WITHOUT COMPLICATION, WITHOUT LONG-TERM CURRENT USE OF INSULIN: ICD-10-CM

## 2023-03-16 DIAGNOSIS — E78.2 MIXED HYPERLIPIDEMIA: ICD-10-CM

## 2023-03-16 LAB
ALBUMIN SERPL-MCNC: 3.7 G/DL (ref 3.4–4.8)
ALBUMIN/GLOB SERPL: 1.1 RATIO (ref 1.1–2)
ALP SERPL-CCNC: 37 UNIT/L (ref 40–150)
ALT SERPL-CCNC: 20 UNIT/L (ref 0–55)
AST SERPL-CCNC: 24 UNIT/L (ref 5–34)
BASOPHILS # BLD AUTO: 0.03 X10(3)/MCL (ref 0–0.2)
BASOPHILS NFR BLD AUTO: 0.5 %
BILIRUBIN DIRECT+TOT PNL SERPL-MCNC: 0.3 MG/DL
BUN SERPL-MCNC: 17.5 MG/DL (ref 8.4–25.7)
CALCIUM SERPL-MCNC: 9.3 MG/DL (ref 8.8–10)
CHLORIDE SERPL-SCNC: 105 MMOL/L (ref 98–107)
CHOLEST SERPL-MCNC: 147 MG/DL
CHOLEST/HDLC SERPL: 5 {RATIO} (ref 0–5)
CO2 SERPL-SCNC: 28 MMOL/L (ref 23–31)
CREAT SERPL-MCNC: 0.83 MG/DL (ref 0.73–1.18)
EOSINOPHIL # BLD AUTO: 0.43 X10(3)/MCL (ref 0–0.9)
EOSINOPHIL NFR BLD AUTO: 6.8 %
ERYTHROCYTE [DISTWIDTH] IN BLOOD BY AUTOMATED COUNT: 12.6 % (ref 11.5–17)
EST. AVERAGE GLUCOSE BLD GHB EST-MCNC: 137 MG/DL
GFR SERPLBLD CREATININE-BSD FMLA CKD-EPI: >60 MLS/MIN/1.73/M2
GLOBULIN SER-MCNC: 3.3 GM/DL (ref 2.4–3.5)
GLUCOSE SERPL-MCNC: 104 MG/DL (ref 82–115)
HBA1C MFR BLD: 6.4 %
HCT VFR BLD AUTO: 44.4 % (ref 42–52)
HDLC SERPL-MCNC: 32 MG/DL (ref 35–60)
HGB BLD-MCNC: 14.1 G/DL (ref 14–18)
IMM GRANULOCYTES # BLD AUTO: 0.01 X10(3)/MCL (ref 0–0.04)
IMM GRANULOCYTES NFR BLD AUTO: 0.2 %
LDLC SERPL CALC-MCNC: 96 MG/DL (ref 50–140)
LYMPHOCYTES # BLD AUTO: 1.97 X10(3)/MCL (ref 0.6–4.6)
LYMPHOCYTES NFR BLD AUTO: 31.3 %
MCH RBC QN AUTO: 27.3 PG
MCHC RBC AUTO-ENTMCNC: 31.8 G/DL (ref 33–36)
MCV RBC AUTO: 86 FL (ref 80–94)
MONOCYTES # BLD AUTO: 0.65 X10(3)/MCL (ref 0.1–1.3)
MONOCYTES NFR BLD AUTO: 10.3 %
NEUTROPHILS # BLD AUTO: 3.2 X10(3)/MCL (ref 2.1–9.2)
NEUTROPHILS NFR BLD AUTO: 50.9 %
NRBC BLD AUTO-RTO: 0 %
PLATELET # BLD AUTO: 226 X10(3)/MCL (ref 130–400)
PMV BLD AUTO: 9.4 FL (ref 7.4–10.4)
POTASSIUM SERPL-SCNC: 3.9 MMOL/L (ref 3.5–5.1)
PROT SERPL-MCNC: 7 GM/DL (ref 5.8–7.6)
RBC # BLD AUTO: 5.16 X10(6)/MCL (ref 4.7–6.1)
SODIUM SERPL-SCNC: 141 MMOL/L (ref 136–145)
TRIGL SERPL-MCNC: 97 MG/DL (ref 34–140)
VLDLC SERPL CALC-MCNC: 19 MG/DL
WBC # SPEC AUTO: 6.3 X10(3)/MCL (ref 4.5–11.5)

## 2023-03-16 PROCEDURE — 83036 HEMOGLOBIN GLYCOSYLATED A1C: CPT

## 2023-03-16 PROCEDURE — 80053 COMPREHEN METABOLIC PANEL: CPT

## 2023-03-16 PROCEDURE — 36415 COLL VENOUS BLD VENIPUNCTURE: CPT

## 2023-03-16 PROCEDURE — 80061 LIPID PANEL: CPT

## 2023-03-16 PROCEDURE — 85025 COMPLETE CBC W/AUTO DIFF WBC: CPT

## 2023-03-21 ENCOUNTER — OFFICE VISIT (OUTPATIENT)
Dept: INTERNAL MEDICINE | Facility: CLINIC | Age: 58
End: 2023-03-21
Payer: COMMERCIAL

## 2023-03-21 VITALS
BODY MASS INDEX: 47.25 KG/M2 | HEART RATE: 63 BPM | DIASTOLIC BLOOD PRESSURE: 80 MMHG | WEIGHT: 294 LBS | SYSTOLIC BLOOD PRESSURE: 144 MMHG | RESPIRATION RATE: 16 BRPM | HEIGHT: 66 IN | TEMPERATURE: 98 F

## 2023-03-21 DIAGNOSIS — Z23 IMMUNIZATION DUE: ICD-10-CM

## 2023-03-21 DIAGNOSIS — I10 PRIMARY HYPERTENSION: ICD-10-CM

## 2023-03-21 DIAGNOSIS — E66.01 MORBID OBESITY: ICD-10-CM

## 2023-03-21 DIAGNOSIS — E11.9 TYPE 2 DIABETES MELLITUS WITHOUT COMPLICATION, WITHOUT LONG-TERM CURRENT USE OF INSULIN: ICD-10-CM

## 2023-03-21 DIAGNOSIS — N52.9 ERECTILE DYSFUNCTION, UNSPECIFIED ERECTILE DYSFUNCTION TYPE: ICD-10-CM

## 2023-03-21 DIAGNOSIS — Z00.00 WELLNESS EXAMINATION: Primary | ICD-10-CM

## 2023-03-21 DIAGNOSIS — E78.2 MIXED HYPERLIPIDEMIA: ICD-10-CM

## 2023-03-21 PROCEDURE — 3008F BODY MASS INDEX DOCD: CPT | Mod: CPTII,,, | Performed by: NURSE PRACTITIONER

## 2023-03-21 PROCEDURE — 3077F PR MOST RECENT SYSTOLIC BLOOD PRESSURE >= 140 MM HG: ICD-10-PCS | Mod: CPTII,,, | Performed by: NURSE PRACTITIONER

## 2023-03-21 PROCEDURE — 3077F SYST BP >= 140 MM HG: CPT | Mod: CPTII,,, | Performed by: NURSE PRACTITIONER

## 2023-03-21 PROCEDURE — 90677 PCV20 VACCINE IM: CPT | Mod: PBBFAC

## 2023-03-21 PROCEDURE — 99214 OFFICE O/P EST MOD 30 MIN: CPT | Mod: PBBFAC | Performed by: NURSE PRACTITIONER

## 2023-03-21 PROCEDURE — 1159F PR MEDICATION LIST DOCUMENTED IN MEDICAL RECORD: ICD-10-PCS | Mod: CPTII,,, | Performed by: NURSE PRACTITIONER

## 2023-03-21 PROCEDURE — 3008F PR BODY MASS INDEX (BMI) DOCUMENTED: ICD-10-PCS | Mod: CPTII,,, | Performed by: NURSE PRACTITIONER

## 2023-03-21 PROCEDURE — 3079F PR MOST RECENT DIASTOLIC BLOOD PRESSURE 80-89 MM HG: ICD-10-PCS | Mod: CPTII,,, | Performed by: NURSE PRACTITIONER

## 2023-03-21 PROCEDURE — 1160F PR REVIEW ALL MEDS BY PRESCRIBER/CLIN PHARMACIST DOCUMENTED: ICD-10-PCS | Mod: CPTII,,, | Performed by: NURSE PRACTITIONER

## 2023-03-21 PROCEDURE — 1160F RVW MEDS BY RX/DR IN RCRD: CPT | Mod: CPTII,,, | Performed by: NURSE PRACTITIONER

## 2023-03-21 PROCEDURE — 99396 PR PREVENTIVE VISIT,EST,40-64: ICD-10-PCS | Mod: S$PBB,,, | Performed by: NURSE PRACTITIONER

## 2023-03-21 PROCEDURE — 1159F MED LIST DOCD IN RCRD: CPT | Mod: CPTII,,, | Performed by: NURSE PRACTITIONER

## 2023-03-21 PROCEDURE — 99396 PREV VISIT EST AGE 40-64: CPT | Mod: S$PBB,,, | Performed by: NURSE PRACTITIONER

## 2023-03-21 PROCEDURE — 3079F DIAST BP 80-89 MM HG: CPT | Mod: CPTII,,, | Performed by: NURSE PRACTITIONER

## 2023-03-21 RX ORDER — LISINOPRIL 20 MG/1
20 TABLET ORAL NIGHTLY
Qty: 90 TABLET | Refills: 1 | Status: SHIPPED | OUTPATIENT
Start: 2023-03-21 | End: 2023-09-21 | Stop reason: SDUPTHER

## 2023-03-21 RX ORDER — TADALAFIL 20 MG/1
20 TABLET ORAL DAILY PRN
Qty: 10 TABLET | Refills: 6 | Status: SHIPPED | OUTPATIENT
Start: 2023-03-21 | End: 2023-09-21 | Stop reason: SDUPTHER

## 2023-03-21 RX ORDER — ROSUVASTATIN CALCIUM 40 MG/1
40 TABLET, COATED ORAL NIGHTLY
Qty: 90 TABLET | Refills: 1 | Status: SHIPPED | OUTPATIENT
Start: 2023-03-21 | End: 2023-09-21 | Stop reason: SDUPTHER

## 2023-03-21 RX ORDER — LISINOPRIL AND HYDROCHLOROTHIAZIDE 12.5; 2 MG/1; MG/1
1 TABLET ORAL DAILY
Qty: 90 TABLET | Refills: 1 | Status: SHIPPED | OUTPATIENT
Start: 2023-03-21 | End: 2023-09-21 | Stop reason: SDUPTHER

## 2023-03-21 RX ORDER — EZETIMIBE 10 MG/1
10 TABLET ORAL NIGHTLY
Qty: 90 TABLET | Refills: 1 | Status: SHIPPED | OUTPATIENT
Start: 2023-03-21 | End: 2023-09-21 | Stop reason: SDUPTHER

## 2023-03-21 NOTE — PROGRESS NOTES
FANG Harkins   [facility]  [address] [facility] [address]      PATIENT NAME: Jesus Martinez  : 1965  DATE: 3/21/23  MRN: [id number]      Reason for Visit / Chief Complaint: Follow-up (Lab review)       History of Present Illness / Problem Focused Workflow     Jesus Martinez presents to the clinic with Follow-up (Lab review)     89yo AAM here today for f/u  PMHx includes mild CAD, HLD, HTN, T2DM, Vitamin D deficiency, right knee pain, and ED. Former smoker.  <10 pack/yr history of smoking. Followed by [facility] and [facility]. Was referred to PT by [facility] - does not want to use.     Prostate Cancer Screening - 22 PSA 1.91  Colon Cancer Screening -  23 Cologuard Negative   Osteoporosis Screening - 22 Vitamin D level 42.2  HCV Screenin/15/22 Negative  Wellness Visit: 23  Pt here today for routine wellness OV with labs completed, discussed, & reviewed with no questions or concerns at this time. Meds reviewed and refilled appropriately. All screenings UTD. BP elevated today in clinic, reports he has some salt this morning, understands low sodium diet. Will f/u in 6 months F2F for evaluation with fasting labs.   Denies chest pain, shortness of breath, cough, fever, headache, dizziness, weakness, abdominal pain, nausea, vomiting, diarrhea, constipation, black/bloody stools, unplanned weight loss, night sweats, changes in urinary patterns, burning/odor with urination, depression, anxiety, and SI/HI.           Review of Systems     Review of Systems   Constitutional: Negative.    HENT: Negative.     Eyes: Negative.    Respiratory: Negative.     Cardiovascular: Negative.    Gastrointestinal: Negative.    Endocrine: Negative.    Genitourinary: Negative.    Neurological: Negative.    Psychiatric/Behavioral: Negative.         Medications and Allergies     Medications  Medication List with Changes/Refills    Current Medications    ACETAMINOPHEN (TYLENOL) 325 MG TABLET    Take 650 mg by mouth.    MENTHOL 3.5 % GEL    Apply topically.   Changed and/or Refilled Medications    Modified Medication Previous Medication    EZETIMIBE (ZETIA) 10 MG TABLET ezetimibe (ZETIA) 10 mg tablet       Take 1 tablet (10 mg total) by mouth every evening. For High Cholesterol    Take 1 tablet (10 mg total) by mouth every evening. For High Cholesterol    LISINOPRIL (PRINIVIL,ZESTRIL) 20 MG TABLET lisinopriL (PRINIVIL,ZESTRIL) 20 MG tablet       Take 1 tablet (20 mg total) by mouth every evening. For High Blood Pressure    Take 1 tablet (20 mg total) by mouth every evening. For High Blood Pressure    LISINOPRIL-HYDROCHLOROTHIAZIDE (PRINZIDE,ZESTORETIC) 20-12.5 MG PER TABLET lisinopriL-hydrochlorothiazide (PRINZIDE,ZESTORETIC) 20-12.5 mg per tablet       Take 1 tablet by mouth once daily. For High Blood Pressure    Take 1 tablet by mouth once daily. For High Blood Pressure    ROSUVASTATIN (CRESTOR) 40 MG TAB rosuvastatin (CRESTOR) 40 MG Tab       Take 1 tablet (40 mg total) by mouth every evening. For High Cholesterol    Take 1 tablet (40 mg total) by mouth every evening. For High Cholesterol    TADALAFIL (CIALIS) 20 MG TAB tadalafiL (CIALIS) 20 MG Tab       Take 1 tablet (20 mg total) by mouth daily as needed (Erectile Dysfunction).    Take 1 tablet (20 mg total) by mouth daily as needed (Erectile Dysfunction).         Allergies  Review of patient's allergies indicates:  No Known Allergies    Physical Examination     Vitals:    03/21/23 0825   BP: (!) 144/80   Pulse:    Resp:    Temp:      Physical Exam  Vitals reviewed.   Constitutional:       Appearance: Normal appearance. He is normal weight.   HENT:      Head: Normocephalic.   Cardiovascular:      Rate and Rhythm: Normal rate and regular rhythm.      Pulses: Normal pulses.      Heart sounds: Normal heart sounds.   Pulmonary:      Effort: Pulmonary effort is normal.      Breath sounds:  Normal breath sounds.   Abdominal:      General: Abdomen is flat.      Palpations: Abdomen is soft.   Musculoskeletal:         General: Normal range of motion.      Cervical back: Normal range of motion.   Skin:     General: Skin is warm and dry.   Neurological:      Mental Status: He is alert.   Psychiatric:         Mood and Affect: Mood normal.         Results     Lab Results   Component Value Date    WBC 6.3 03/16/2023    RBC 5.16 03/16/2023    HGB 14.1 03/16/2023    HCT 44.4 03/16/2023    MCV 86.0 03/16/2023    MCH 27.3 03/16/2023    MCHC 31.8 (L) 03/16/2023    RDW 12.6 03/16/2023     03/16/2023    MPV 9.4 03/16/2023     Sodium Level   Date Value Ref Range Status   03/16/2023 141 136 - 145 mmol/L Final     Potassium Level   Date Value Ref Range Status   03/16/2023 3.9 3.5 - 5.1 mmol/L Final     Carbon Dioxide   Date Value Ref Range Status   03/16/2023 28 23 - 31 mmol/L Final     Blood Urea Nitrogen   Date Value Ref Range Status   03/16/2023 17.5 8.4 - 25.7 mg/dL Final     Creatinine   Date Value Ref Range Status   03/16/2023 0.83 0.73 - 1.18 mg/dL Final     Calcium Level Total   Date Value Ref Range Status   03/16/2023 9.3 8.8 - 10.0 mg/dL Final     Albumin Level   Date Value Ref Range Status   03/16/2023 3.7 3.4 - 4.8 g/dL Final     Bilirubin Total   Date Value Ref Range Status   03/16/2023 0.3 <=1.5 mg/dL Final     Alkaline Phosphatase   Date Value Ref Range Status   03/16/2023 37 (L) 40 - 150 unit/L Final     Aspartate Aminotransferase   Date Value Ref Range Status   03/16/2023 24 5 - 34 unit/L Final     Alanine Aminotransferase   Date Value Ref Range Status   03/16/2023 20 0 - 55 unit/L Final     Estimated GFR-Non    Date Value Ref Range Status   03/10/2022 >105 >=90      Lab Results   Component Value Date    CHOL 147 03/16/2023     Lab Results   Component Value Date    HDL 32 (L) 03/16/2023     No results found for: LDLCALC  Lab Results   Component Value Date    TRIG 97 03/16/2023      No results found for: CHOLHDL  Lab Results   Component Value Date    TSH 0.9541 09/19/2022     Lab Results   Component Value Date    PHUR 7.0 03/10/2022    PROTEINUA Negative 03/16/2023    GLUCUA Normal 03/10/2022    KETONESU Negative 03/10/2022    OCCULTUA Negative 03/10/2022    NITRITE Negative 03/10/2022    LEUKOCYTESUR Negative 03/16/2023     Lab Results   Component Value Date    HGBA1C 6.4 03/16/2023    HGBA1C 6.4 12/15/2022    HGBA1C 6.6 09/19/2022           Assessment         ICD-10-CM ICD-9-CM   1. Wellness examination  Z00.00 V70.0   2. Type 2 diabetes mellitus without complication, without long-term current use of insulin  E11.9 250.00   3. Mixed hyperlipidemia  E78.2 272.2   4. Primary hypertension  I10 401.9   5. Morbid obesity  E66.01 278.01   6. Immunization due  Z23 V05.9   7. Erectile dysfunction, unspecified erectile dysfunction type  N52.9 607.84       Plan      Problem List Items Addressed This Visit          Cardiac/Vascular    Primary hypertension    Overview     Low Sodium Diet (Dash Diet - less than 2 grams of sodium per day).  Maintain healthy weight with goal BMI <30. Exercise 30 minutes per day 5 days per week.           Current Assessment & Plan     Continue RX Lisinopril 20 mg daily  Continue RX HCTZ / Lisinopril 12.5/ 20 mg daily         Relevant Medications    lisinopriL-hydrochlorothiazide (PRINZIDE,ZESTORETIC) 20-12.5 mg per tablet    lisinopriL (PRINIVIL,ZESTRIL) 20 MG tablet    Mixed hyperlipidemia    Overview     Follow a low cholesterol, low saturated fat diet with less than 200 mg of cholesterol a day.   Avoid fried foods and high saturated fats (clinton, sausage, cookies, cakes, chips, cheese, whole milk, butter, mayonnaise, creamy dressings, gravy, stew, gumbo, boudin, cracklins and cream sauces).  Add flax seed or fish oil supplements to diet.   Increase dietary fiber.   Regular exercise improves cholesterol levels.  Physical activity 5 times a week for 30 minutes per day  (or 150 minutes per week).   Stressed importance of dietary modifications.           Current Assessment & Plan     FLP WNL  Continue Rosuvastatin 40 mg qhs  Continue RX Zetia 10 mg daily         Relevant Medications    rosuvastatin (CRESTOR) 40 MG Tab    ezetimibe (ZETIA) 10 mg tablet    Other Relevant Orders    Lipid Panel    Comprehensive Metabolic Panel       Renal/    Erectile dysfunction    Relevant Medications    tadalafiL (CIALIS) 20 MG Tab       Endocrine    Morbid obesity    Current Assessment & Plan     Goal BMI > 30  Dicussed Healthy Diet &   Encouraged to exercise 3 x weekly  Increase Water Intake  Eat more fruits and vegetables  Avoid soda & alcohol           Type 2 diabetes mellitus without complication, without long-term current use of insulin    Overview     Continue medications as prescribed.  Follow ADA diet.  Avoid soda, simple sweets, and limit rice/pasta/bread/starches and consume brown options when possible.   Maintain healthy weight with BMI goal <30.   Perform aerobic exercise for 150 minutes per week (or 5 days a week for 30 minutes each day).   Examine feet daily.            Current Assessment & Plan     Stable - A1C 6.4  Diet Controlled          Relevant Orders    Hemoglobin A1C    Microalbumin/Creatinine Ratio, Urine    Urinalysis, Reflex to Urine Culture    CBC Auto Differential       Other    Wellness examination - Primary    Current Assessment & Plan     Pt wellness visit completed today with appropriate lab work.             Other Visit Diagnoses       Immunization due        Relevant Orders    (In Office Administered) Pneumococcal Conjugate Vaccine (20 Valent) (IM) (Completed)            No future appointments.         Signature:     [facility]  [address] [facility] [address]    Date of encounter: 3/21/23

## 2023-06-23 ENCOUNTER — TELEPHONE (OUTPATIENT)
Dept: INTERNAL MEDICINE | Facility: CLINIC | Age: 58
End: 2023-06-23
Payer: COMMERCIAL

## 2023-06-23 ENCOUNTER — TELEPHONE (OUTPATIENT)
Dept: INTERNAL MEDICINE | Facility: CLINIC | Age: 58
End: 2023-06-23

## 2023-06-23 NOTE — TELEPHONE ENCOUNTER
Please contact and set pt up for a Blood Pressure check, Can we first available appt or first available BP check slot on a Tuesday afternoon - whichever is first.    Thanks.

## 2023-06-26 PROBLEM — Z00.00 WELLNESS EXAMINATION: Status: RESOLVED | Noted: 2023-03-21 | Resolved: 2023-06-26

## 2023-08-08 ENCOUNTER — OFFICE VISIT (OUTPATIENT)
Dept: INTERNAL MEDICINE | Facility: CLINIC | Age: 58
End: 2023-08-08
Payer: COMMERCIAL

## 2023-08-08 VITALS
SYSTOLIC BLOOD PRESSURE: 131 MMHG | RESPIRATION RATE: 16 BRPM | HEIGHT: 66 IN | DIASTOLIC BLOOD PRESSURE: 83 MMHG | TEMPERATURE: 98 F | WEIGHT: 289 LBS | BODY MASS INDEX: 46.45 KG/M2 | HEART RATE: 93 BPM

## 2023-08-08 DIAGNOSIS — I10 PRIMARY HYPERTENSION: ICD-10-CM

## 2023-08-08 PROCEDURE — 1159F MED LIST DOCD IN RCRD: CPT | Mod: CPTII,,, | Performed by: NURSE PRACTITIONER

## 2023-08-08 PROCEDURE — 3075F PR MOST RECENT SYSTOLIC BLOOD PRESS GE 130-139MM HG: ICD-10-PCS | Mod: CPTII,,, | Performed by: NURSE PRACTITIONER

## 2023-08-08 PROCEDURE — 99212 OFFICE O/P EST SF 10 MIN: CPT | Mod: S$PBB,,, | Performed by: NURSE PRACTITIONER

## 2023-08-08 PROCEDURE — 3008F PR BODY MASS INDEX (BMI) DOCUMENTED: ICD-10-PCS | Mod: CPTII,,, | Performed by: NURSE PRACTITIONER

## 2023-08-08 PROCEDURE — 99214 OFFICE O/P EST MOD 30 MIN: CPT | Mod: PBBFAC | Performed by: NURSE PRACTITIONER

## 2023-08-08 PROCEDURE — 3044F HG A1C LEVEL LT 7.0%: CPT | Mod: CPTII,,, | Performed by: NURSE PRACTITIONER

## 2023-08-08 PROCEDURE — 3075F SYST BP GE 130 - 139MM HG: CPT | Mod: CPTII,,, | Performed by: NURSE PRACTITIONER

## 2023-08-08 PROCEDURE — 1160F PR REVIEW ALL MEDS BY PRESCRIBER/CLIN PHARMACIST DOCUMENTED: ICD-10-PCS | Mod: CPTII,,, | Performed by: NURSE PRACTITIONER

## 2023-08-08 PROCEDURE — 3079F DIAST BP 80-89 MM HG: CPT | Mod: CPTII,,, | Performed by: NURSE PRACTITIONER

## 2023-08-08 PROCEDURE — 3079F PR MOST RECENT DIASTOLIC BLOOD PRESSURE 80-89 MM HG: ICD-10-PCS | Mod: CPTII,,, | Performed by: NURSE PRACTITIONER

## 2023-08-08 PROCEDURE — 3008F BODY MASS INDEX DOCD: CPT | Mod: CPTII,,, | Performed by: NURSE PRACTITIONER

## 2023-08-08 PROCEDURE — 3044F PR MOST RECENT HEMOGLOBIN A1C LEVEL <7.0%: ICD-10-PCS | Mod: CPTII,,, | Performed by: NURSE PRACTITIONER

## 2023-08-08 PROCEDURE — 1160F RVW MEDS BY RX/DR IN RCRD: CPT | Mod: CPTII,,, | Performed by: NURSE PRACTITIONER

## 2023-08-08 PROCEDURE — 4010F PR ACE/ARB THEARPY RXD/TAKEN: ICD-10-PCS | Mod: CPTII,,, | Performed by: NURSE PRACTITIONER

## 2023-08-08 PROCEDURE — 99212 PR OFFICE/OUTPT VISIT, EST, LEVL II, 10-19 MIN: ICD-10-PCS | Mod: S$PBB,,, | Performed by: NURSE PRACTITIONER

## 2023-08-08 PROCEDURE — 1159F PR MEDICATION LIST DOCUMENTED IN MEDICAL RECORD: ICD-10-PCS | Mod: CPTII,,, | Performed by: NURSE PRACTITIONER

## 2023-08-08 PROCEDURE — 4010F ACE/ARB THERAPY RXD/TAKEN: CPT | Mod: CPTII,,, | Performed by: NURSE PRACTITIONER

## 2023-08-08 NOTE — PROGRESS NOTES
FANG Harkins   [facility]  [address] [facility] [address]      PATIENT NAME: Jesus Martinez  : 1965  DATE: 23  MRN: [id number]      Patient PCP Information       Provider PCP Type    FANG Harkins [facility]            Reason for Visit / Chief Complaint: Follow-up (B/P check )       History of Present Illness / Problem Focused Workflow     Jesus Martinez presents to the clinic with Follow-up (B/P check )     57 yo AAM here today for f/u  PMHx includes mild CAD, HLD, HTN, T2DM, Vitamin D deficiency, right knee pain, and ED. Former smoker.  <10 pack/yr history of smoking. Followed by [facility] and [facility]. Was referred to PT by [facility] - does not want to use.     Prostate Cancer Screening - 22 PSA 1.91  Colon Cancer Screening -  23 Cologuard Negative   Osteoporosis Screening - 22 Vitamin D level 42.2  HCV Screenin/15/22 Negative  Wellness Visit: 23  Pt here today for in between OV for BP check, /83 today, at goal. Reports compliance with BP meds.   Pt aware of upcoming OV next month with wellness labs. Denies any other issues at this time.   Denies chest pain, shortness of breath, cough, fever, headache, dizziness, weakness, abdominal pain, nausea, vomiting, diarrhea, constipation, black/bloody stools, unplanned weight loss, night sweats, changes in urinary patterns, burning/odor with urination, depression, anxiety, and SI/HI.             Review of Systems     Review of Systems   Constitutional: Negative.    HENT: Negative.     Eyes: Negative.    Respiratory: Negative.     Cardiovascular: Negative.    Gastrointestinal: Negative.    Endocrine: Negative.    Genitourinary: Negative.    Neurological: Negative.    Psychiatric/Behavioral: Negative.           Medications and Allergies     Medications  Current Outpatient Medications   Medication Instructions    acetaminophen (TYLENOL) 650  "mg, Oral    ezetimibe (ZETIA) 10 mg, Oral, Nightly, For High Cholesterol    lisinopriL (PRINIVIL,ZESTRIL) 20 mg, Oral, Nightly, For High Blood Pressure    lisinopriL-hydrochlorothiazide (PRINZIDE,ZESTORETIC) 20-12.5 mg per tablet 1 tablet, Oral, Daily, For High Blood Pressure    menthol 3.5 % Gel Topical    rosuvastatin (CRESTOR) 40 mg, Oral, Nightly, For High Cholesterol    tadalafiL (CIALIS) 20 mg, Oral, Daily PRN         Allergies  Review of patient's allergies indicates:  No Known Allergies    Physical Examination     Visit Vitals  /83   Pulse 93   Temp 98.2 °F (36.8 °C)   Resp 16   Ht 5' 6" (1.676 m)   Wt 131.1 kg (289 lb)   BMI 46.65 kg/m²       Physical Exam  Vitals reviewed.   Constitutional:       Appearance: Normal appearance. He is normal weight.   HENT:      Head: Normocephalic.   Cardiovascular:      Rate and Rhythm: Normal rate and regular rhythm.      Pulses: Normal pulses.      Heart sounds: Normal heart sounds.   Pulmonary:      Effort: Pulmonary effort is normal.      Breath sounds: Normal breath sounds.   Abdominal:      General: Abdomen is flat.      Palpations: Abdomen is soft.   Musculoskeletal:         General: Normal range of motion.      Cervical back: Normal range of motion.   Skin:     General: Skin is warm and dry.   Neurological:      Mental Status: He is alert.   Psychiatric:         Mood and Affect: Mood normal.           Results     Lab Results   Component Value Date    WBC 6.3 03/16/2023    RBC 5.16 03/16/2023    HGB 14.1 03/16/2023    HCT 44.4 03/16/2023    MCV 86.0 03/16/2023    MCH 27.3 03/16/2023    MCHC 31.8 (L) 03/16/2023    RDW 12.6 03/16/2023     03/16/2023    MPV 9.4 03/16/2023     CMP  Sodium Level   Date Value Ref Range Status   03/16/2023 141 136 - 145 mmol/L Final     Potassium Level   Date Value Ref Range Status   03/16/2023 3.9 3.5 - 5.1 mmol/L Final     Carbon Dioxide   Date Value Ref Range Status   03/16/2023 28 23 - 31 mmol/L Final     Blood Urea " "Nitrogen   Date Value Ref Range Status   03/16/2023 17.5 8.4 - 25.7 mg/dL Final     Creatinine   Date Value Ref Range Status   03/16/2023 0.83 0.73 - 1.18 mg/dL Final     Calcium Level Total   Date Value Ref Range Status   03/16/2023 9.3 8.8 - 10.0 mg/dL Final     Albumin Level   Date Value Ref Range Status   03/16/2023 3.7 3.4 - 4.8 g/dL Final     Bilirubin Total   Date Value Ref Range Status   03/16/2023 0.3 <=1.5 mg/dL Final     Alkaline Phosphatase   Date Value Ref Range Status   03/16/2023 37 (L) 40 - 150 unit/L Final     Aspartate Aminotransferase   Date Value Ref Range Status   03/16/2023 24 5 - 34 unit/L Final     Alanine Aminotransferase   Date Value Ref Range Status   03/16/2023 20 0 - 55 unit/L Final     Estimated GFR-Non    Date Value Ref Range Status   03/10/2022 >105 >=90      Lab Results   Component Value Date    CHOL 147 03/16/2023     Lab Results   Component Value Date    HDL 32 (L) 03/16/2023     No results found for: "LDLCALC"  Lab Results   Component Value Date    TRIG 97 03/16/2023     No results found for: "CHOLHDL"  Lab Results   Component Value Date    TSH 0.9541 09/19/2022         Assessment        ICD-10-CM ICD-9-CM   1. Primary hypertension  I10 401.9        Plan      Problem List Items Addressed This Visit          Cardiac/Vascular    Primary hypertension    Overview     Low Sodium Diet (Dash Diet - less than 2 grams of sodium per day).  Maintain healthy weight with goal BMI <30. Exercise 30 minutes per day 5 days per week.           Current Assessment & Plan     /83  Continue RX lisinopril 20 mg daily / hctz /lisinopril 12.5/20 mg daily             Future Appointments   Date Time Provider Department Center   9/21/2023  7:30 AM Annamarie Galicia, [facility]        Follow up if symptoms worsen or fail to improve.      Signature:     [facility]  [address] [facility] [address]    Date of " encounter: 8/8/23

## 2023-08-30 ENCOUNTER — HOSPITAL ENCOUNTER (EMERGENCY)
Facility: HOSPITAL | Age: 58
Discharge: HOME | End: 2023-08-30
Attending: EMERGENCY MEDICINE
Payer: COMMERCIAL

## 2023-08-30 VITALS
RESPIRATION RATE: 19 BRPM | HEIGHT: 67 IN | WEIGHT: 298 LBS | TEMPERATURE: 98 F | SYSTOLIC BLOOD PRESSURE: 147 MMHG | BODY MASS INDEX: 46.77 KG/M2 | DIASTOLIC BLOOD PRESSURE: 76 MMHG | OXYGEN SATURATION: 100 % | HEART RATE: 79 BPM

## 2023-08-30 DIAGNOSIS — S09.90XA MINOR HEAD INJURY, INITIAL ENCOUNTER: Primary | ICD-10-CM

## 2023-08-30 LAB
BASOPHILS # BLD AUTO: 0.03 X10(3)/MCL
BASOPHILS NFR BLD AUTO: 0.4 %
EOSINOPHIL # BLD AUTO: 0.27 X10(3)/MCL (ref 0–0.9)
EOSINOPHIL NFR BLD AUTO: 3.8 %
ERYTHROCYTE [DISTWIDTH] IN BLOOD BY AUTOMATED COUNT: 12.9 % (ref 11.5–17)
GROUP & RH: NORMAL
HCT VFR BLD AUTO: 43.4 % (ref 42–52)
HGB BLD-MCNC: 14.4 G/DL (ref 14–18)
IMM GRANULOCYTES # BLD AUTO: 0.03 X10(3)/MCL (ref 0–0.04)
IMM GRANULOCYTES NFR BLD AUTO: 0.4 %
INDIRECT COOMBS GEL: NORMAL
INR PPP: 0.9
LYMPHOCYTES # BLD AUTO: 2.18 X10(3)/MCL (ref 0.6–4.6)
LYMPHOCYTES NFR BLD AUTO: 31 %
MCH RBC QN AUTO: 28 PG (ref 27–31)
MCHC RBC AUTO-ENTMCNC: 33.2 G/DL (ref 33–36)
MCV RBC AUTO: 84.3 FL (ref 80–94)
MONOCYTES # BLD AUTO: 0.72 X10(3)/MCL (ref 0.1–1.3)
MONOCYTES NFR BLD AUTO: 10.2 %
NEUTROPHILS # BLD AUTO: 3.81 X10(3)/MCL (ref 2.1–9.2)
NEUTROPHILS NFR BLD AUTO: 54.2 %
NRBC BLD AUTO-RTO: 0 %
PLATELET # BLD AUTO: 193 X10(3)/MCL (ref 130–400)
PMV BLD AUTO: 9.9 FL (ref 7.4–10.4)
PROTHROMBIN TIME: 12.1 SECONDS (ref 12.5–14.5)
RBC # BLD AUTO: 5.15 X10(6)/MCL (ref 4.7–6.1)
SPECIMEN OUTDATE: NORMAL
WBC # SPEC AUTO: 7.04 X10(3)/MCL (ref 4.5–11.5)

## 2023-08-30 PROCEDURE — 85025 COMPLETE CBC W/AUTO DIFF WBC: CPT | Performed by: EMERGENCY MEDICINE

## 2023-08-30 PROCEDURE — 90715 TDAP VACCINE 7 YRS/> IM: CPT | Performed by: EMERGENCY MEDICINE

## 2023-08-30 PROCEDURE — 85610 PROTHROMBIN TIME: CPT | Performed by: EMERGENCY MEDICINE

## 2023-08-30 PROCEDURE — 99285 EMERGENCY DEPT VISIT HI MDM: CPT | Mod: 25

## 2023-08-30 PROCEDURE — 63600175 PHARM REV CODE 636 W HCPCS: Performed by: EMERGENCY MEDICINE

## 2023-08-30 PROCEDURE — 90471 IMMUNIZATION ADMIN: CPT | Performed by: EMERGENCY MEDICINE

## 2023-08-30 PROCEDURE — G0390 TRAUMA RESPONS W/HOSP CRITI: HCPCS

## 2023-08-30 PROCEDURE — 86850 RBC ANTIBODY SCREEN: CPT | Performed by: EMERGENCY MEDICINE

## 2023-08-30 PROCEDURE — 25000003 PHARM REV CODE 250: Performed by: EMERGENCY MEDICINE

## 2023-08-30 RX ORDER — ACETAMINOPHEN 325 MG/1
650 TABLET ORAL
Status: COMPLETED | OUTPATIENT
Start: 2023-08-30 | End: 2023-08-30

## 2023-08-30 RX ORDER — INDOMETHACIN 50 MG/1
50 CAPSULE ORAL
Qty: 15 CAPSULE | Refills: 0 | Status: SHIPPED | OUTPATIENT
Start: 2023-08-30 | End: 2023-11-14

## 2023-08-30 RX ADMIN — ACETAMINOPHEN 650 MG: 325 TABLET, FILM COATED ORAL at 10:08

## 2023-08-30 RX ADMIN — TETANUS TOXOID, REDUCED DIPHTHERIA TOXOID AND ACELLULAR PERTUSSIS VACCINE, ADSORBED 0.5 ML: 5; 2.5; 8; 8; 2.5 SUSPENSION INTRAMUSCULAR at 09:08

## 2023-08-30 RX ADMIN — BACITRACIN ZINC, NEOMYCIN, POLYMYXIN B 1 EACH: 400; 3.5; 5 OINTMENT TOPICAL at 10:08

## 2023-08-30 NOTE — Clinical Note
"Jesus Trujillo" Juan was seen and treated in our emergency department on 8/30/2023.  He may return to work on 09/02/2023.       If you have any questions or concerns, please don't hesitate to call.      Patrick Dsouza III, MD"

## 2023-08-30 NOTE — ED PROVIDER NOTES
Encounter Date: 8/30/2023    SCRIBE #1 NOTE: I, Ruth Kelsey, am scribing for, and in the presence of,  Patrick Dsouza III, MD. I have scribed the following portions of the note - Other sections scribed: HPI, ROS, PE.       History   No chief complaint on file.    58 year old male presents to the ED via EMS for LOC. EMS reports that pt was cutting a tree branch and it fell and hit his head. Pt was unconscious for 5 minutes and the incident was witnessed by a coworker. Pt had a GCS of 14 on scene but has started to come to. Pt denies chest pain, neck pain, abdominal pain, and back pain.     The history is provided by the patient and the EMS personnel. No  was used.   Head Injury   The incident occurred just prior to arrival. He came to the ER via by ambulance. The injury mechanism was a direct blow. He lost consciousness for a period of 1 - 5 minutes. There was no blood loss.     Review of patient's allergies indicates:  No Known Allergies  No past medical history on file.  No past surgical history on file.  No family history on file.     Review of Systems   Cardiovascular:  Negative for chest pain.   Gastrointestinal:  Negative for abdominal pain.   Musculoskeletal:  Negative for back pain and neck pain.   Neurological:         LOC       Physical Exam     Initial Vitals   BP Pulse Resp Temp SpO2   08/30/23 0920 08/30/23 0920 08/30/23 0920 08/30/23 0920 08/30/23 0900   132/85 88 19 98.5 °F (36.9 °C) 95 %      MAP       --                Physical Exam    Nursing note and vitals reviewed.  Constitutional: No distress.   HENT:   Head: Normocephalic.   Abrasion to right forehead with no sutureable laceration    Eyes: Pupils are equal, round, and reactive to light.   Pupils 2-4 mm bilaterally    Neck: Trachea normal.   Cervical spine clinically clear    Cardiovascular:  Normal rate and regular rhythm.           No murmur heard.  Pulmonary/Chest: Breath sounds normal. No respiratory distress.    Abdominal: Abdomen is soft. Bowel sounds are normal. He exhibits no distension. There is no abdominal tenderness.   Musculoskeletal:         General: Normal range of motion.      Lumbar back: Normal.     Neurological: He is alert and oriented to person, place, and time. He has normal strength. No cranial nerve deficit.   Skin: Skin is warm and dry. No rash noted.   Psychiatric: He has a normal mood and affect. Judgment normal.         ED Course   Procedures  Labs Reviewed   PROTIME-INR - Abnormal; Notable for the following components:       Result Value    PT 12.1 (*)     All other components within normal limits   CBC W/ AUTO DIFFERENTIAL    Narrative:     The following orders were created for panel order CBC auto differential.  Procedure                               Abnormality         Status                     ---------                               -----------         ------                     CBC with Differential[[id number]]                            Final result                 Please view results for these tests on the individual orders.   CBC WITH DIFFERENTIAL   COMPREHENSIVE METABOLIC PANEL   TYPE & SCREEN          Imaging Results              CT Head Without Contrast (Final result)  Result time 08/30/23 09:57:50      Final result by Miguel Arias MD (08/30/23 09:57:50)                   Impression:      No acute intracranial findings identified.      Electronically signed by: Miguel Arias  Date:    08/30/2023  Time:    09:57               Narrative:    EXAMINATION:  CT HEAD WITHOUT CONTRAST    CLINICAL HISTORY:  Head trauma, moderate-severe;    TECHNIQUE:  Sequential axial images were performed of the brain without contrast.    Dose product length of 2145 mGycm. Automated exposure control was utilized to minimize radiation dose.    COMPARISON:  None available.    FINDINGS:  There is no intracranial mass effect, midline shift, hydrocephalus or hemorrhage. There is no sulcal effacement or low  attenuation changes to suggest recent large vessel territory infarction.  There is no acute extra axial fluid collection.  There is no acute depressed calvarial fracture.  Visualized paranasal sinuses are clear without mucosal thickening, polypoidal abnormality or air-fluid levels. Mastoid air cells aeration is optimal.                                       Medications   Tdap (BOOSTRIX) vaccine injection 0.5 mL (0.5 mLs Intramuscular Given 8/30/23 0972)   neomycin-bacitracnZn-polymyxnB packet (1 each Topical (Top) Given 8/30/23 1032)   acetaminophen tablet 650 mg (650 mg Oral Given 8/30/23 1036)     Medical Decision Making  Skull fracture minor head injury subdural hematoma intracranial hemorrhage.    Patient arrived with a GCS of 15 reported loss of conscious at the scene not currently on any blood thinners patient without any neck pain full range of motion of neck without hesitation tenderness or discomfort no neck pain to palpation cervical collar removed by me no nausea no vomiting mild headache treated with oral non narcotics.  Patient CT head negative maintain normal mental status patient will be discharged follow-up with primary care no work for 2 days rib return emergency room as needed    Problems Addressed:  Minor head injury, initial encounter: complicated acute illness or injury that poses a threat to life or bodily functions    Amount and/or Complexity of Data Reviewed  Independent Historian: EMS     Details: EMS reports that pt was cutting a tree branch and it fell and hit his head. Pt was unconscious for 5 minutes and the incident was witnessed by a coworker. Pt had a GCS of 14 on scene but has started to come to.   External Data Reviewed: notes.  Labs: ordered.  Radiology: ordered.    Risk  OTC drugs.  Prescription drug management.              Attending Attestation:           Physician Attestation for Scribe:  Physician Attestation Statement for Scribe #1: I, Patrick Dsouza III, MD, reviewed  documentation, as scribed by Ruth Kelsey in my presence, and it is both accurate and complete.                             Clinical Impression:   Final diagnoses:  [S09.90XA] Minor head injury, initial encounter (Primary)        ED Disposition Condition    Discharge Stable          ED Prescriptions    None       Follow-up Information       Follow up With Specialties Details Why Contact Info    PCP  In 3 days               Patrick Dsouza III, MD  08/30/23 9444

## 2023-08-30 NOTE — CONSULTS
"   Trauma Surgery   Activation Note    Patient Name: Jesus Martinez  MRN: [id number]   YOB: 1965  Date: 08/30/2023    LEVEL 2 TRAUMA     Subjective:   History of present illness: Patient is an approximately 58 year old year old male presents to the hospital status post tree branch of the right side of his head with an abrasion.  By report he had a loss of consciousness followed by an episode of GCS 14 for confusion now resolved.  Patient GCS 15.  Patient was a level 2 activation.  He was seen within 30 minutes of arrival.  He was able to see him just after his CT scan.  He complains only of some right-sided head pain where he has a small abrasion.    Primary Survey:  A Patent. Speaking.    B Normal WOB. No SANCHEZ.   C No active bleeding requiring intervention. Pulses intact.    D GCS 15(E 4, V 5, M 6)    E exposed, log-rolled and examined (see below)   F See below     VITAL SIGNS: 24 HR MIN & MAX LAST   Temp  Min: 98.1 °F (36.7 °C)  Max: 98.5 °F (36.9 °C)  98.1 °F (36.7 °C)   BP  Min: 132/85  Max: 155/81  (!) 155/81    Pulse  Min: 84  Max: 88  87    Resp  Min: 17  Max: 20  20    SpO2  Min: 95 %  Max: 96 %  95 %      HT: 5' 7" (170.2 cm)  WT: 135.2 kg (298 lb)  BMI: 46.7     FAST:  Deferred    Medications/transfusions received en-route:  Unknown  Medications/transfusions received in trauma bay:  See emergency department record    Scheduled Meds:  Continuous Infusions:  PRN Meds:    ROS: 12 point ROS negative except as stated in HPI    Allergies: NKDA  PMH:  Hypertension, high cholesterol, on aspirin  PSH: Noncontributory  Social history: Unknown  Objective:   Secondary Survey:   General: Well developed, well nourished, no acute distress, AAOx3  Neuro: CNII-XII grossly intact  HEENT:  Normocephalic, atraumatic, PERRL, cervical collar in place  CV:  RRR  Pulse: 2+ RP b/l, 2+ DP b/l   Resp:  Non-labored breathing, satting on room air  GI:  Abdomen soft, non-tender, non-distended  :  Normal external  " genitalia,  no blood at urethral meatus.   Rectal: Normal tone, no gross blood.  Extremities: Moves all 4 spontaneously and purposefully, no obvious gross deformities.  Back/Spine: No bony TTP, no palpable step offs or deformities.  Cervical back: Normal. No tenderness.  Thoracic back: Normal. No tenderness.  Lumbar back: Normal. No tenderness.  Skin/wounds:  Warm, well perfused, small abrasion of the right side of the forehead  Psych: Normal mood and affect.    Labs:  Labs that her back has been reviewed    Imaging:  CT head has been reviewed    Assessment & Plan:   No indication for admission from a trauma standpoint.  Should the patient's condition change we will be happy to re-evaluate.  Disposition per emergency department.  We will follow up his remaining labs

## 2023-09-18 ENCOUNTER — LAB VISIT (OUTPATIENT)
Dept: LAB | Facility: HOSPITAL | Age: 58
End: 2023-09-18
Attending: NURSE PRACTITIONER
Payer: COMMERCIAL

## 2023-09-18 DIAGNOSIS — E78.2 MIXED HYPERLIPIDEMIA: ICD-10-CM

## 2023-09-18 DIAGNOSIS — E11.9 TYPE 2 DIABETES MELLITUS WITHOUT COMPLICATION, WITHOUT LONG-TERM CURRENT USE OF INSULIN: ICD-10-CM

## 2023-09-18 LAB
ALBUMIN SERPL-MCNC: 3.5 G/DL (ref 3.4–4.8)
ALBUMIN/GLOB SERPL: 1 RATIO (ref 1.1–2)
ALP SERPL-CCNC: 48 UNIT/L (ref 40–150)
ALT SERPL-CCNC: 18 UNIT/L (ref 0–55)
APPEARANCE UR: CLEAR
AST SERPL-CCNC: 17 UNIT/L (ref 5–34)
BACTERIA #/AREA URNS AUTO: ABNORMAL /HPF
BASOPHILS # BLD AUTO: 0.03 X10(3)/MCL
BASOPHILS NFR BLD AUTO: 0.5 %
BILIRUB SERPL-MCNC: 0.5 MG/DL
BILIRUB UR QL STRIP.AUTO: NEGATIVE
BUN SERPL-MCNC: 15.1 MG/DL (ref 8.4–25.7)
CALCIUM SERPL-MCNC: 9.3 MG/DL (ref 8.8–10)
CHLORIDE SERPL-SCNC: 104 MMOL/L (ref 98–107)
CHOLEST SERPL-MCNC: 167 MG/DL
CHOLEST/HDLC SERPL: 4 {RATIO} (ref 0–5)
CO2 SERPL-SCNC: 28 MMOL/L (ref 23–31)
COLOR UR: ABNORMAL
CREAT SERPL-MCNC: 0.89 MG/DL (ref 0.73–1.18)
CREAT UR-MCNC: 193.2 MG/DL (ref 63–166)
EOSINOPHIL # BLD AUTO: 0.38 X10(3)/MCL (ref 0–0.9)
EOSINOPHIL NFR BLD AUTO: 6 %
ERYTHROCYTE [DISTWIDTH] IN BLOOD BY AUTOMATED COUNT: 12.7 % (ref 11.5–17)
EST. AVERAGE GLUCOSE BLD GHB EST-MCNC: 171.4 MG/DL
GFR SERPLBLD CREATININE-BSD FMLA CKD-EPI: >60 MLS/MIN/1.73/M2
GLOBULIN SER-MCNC: 3.5 GM/DL (ref 2.4–3.5)
GLUCOSE SERPL-MCNC: 237 MG/DL (ref 82–115)
GLUCOSE UR QL STRIP.AUTO: ABNORMAL
HBA1C MFR BLD: 7.6 %
HCT VFR BLD AUTO: 42.8 % (ref 42–52)
HDLC SERPL-MCNC: 41 MG/DL (ref 35–60)
HGB BLD-MCNC: 13.8 G/DL (ref 14–18)
HYALINE CASTS #/AREA URNS LPF: ABNORMAL /LPF
IMM GRANULOCYTES # BLD AUTO: 0.01 X10(3)/MCL (ref 0–0.04)
IMM GRANULOCYTES NFR BLD AUTO: 0.2 %
KETONES UR QL STRIP.AUTO: NEGATIVE
LDLC SERPL CALC-MCNC: 103 MG/DL (ref 50–140)
LEUKOCYTE ESTERASE UR QL STRIP.AUTO: NEGATIVE
LYMPHOCYTES # BLD AUTO: 1.82 X10(3)/MCL (ref 0.6–4.6)
LYMPHOCYTES NFR BLD AUTO: 28.7 %
MCH RBC QN AUTO: 27.8 PG (ref 27–31)
MCHC RBC AUTO-ENTMCNC: 32.2 G/DL (ref 33–36)
MCV RBC AUTO: 86.1 FL (ref 80–94)
MICROALBUMIN UR-MCNC: 14.9 UG/ML
MICROALBUMIN/CREAT RATIO PNL UR: 7.7 MG/GM CR (ref 0–30)
MONOCYTES # BLD AUTO: 0.6 X10(3)/MCL (ref 0.1–1.3)
MONOCYTES NFR BLD AUTO: 9.4 %
MUCOUS THREADS URNS QL MICRO: ABNORMAL /LPF
NEUTROPHILS # BLD AUTO: 3.51 X10(3)/MCL (ref 2.1–9.2)
NEUTROPHILS NFR BLD AUTO: 55.2 %
NITRITE UR QL STRIP.AUTO: NEGATIVE
NRBC BLD AUTO-RTO: 0 %
PH UR STRIP.AUTO: 7 [PH]
PLATELET # BLD AUTO: 225 X10(3)/MCL (ref 130–400)
PMV BLD AUTO: 8.9 FL (ref 7.4–10.4)
POTASSIUM SERPL-SCNC: 4.2 MMOL/L (ref 3.5–5.1)
PROT SERPL-MCNC: 7 GM/DL (ref 5.8–7.6)
PROT UR QL STRIP.AUTO: ABNORMAL
RBC # BLD AUTO: 4.97 X10(6)/MCL (ref 4.7–6.1)
RBC #/AREA URNS AUTO: ABNORMAL /HPF
RBC UR QL AUTO: NEGATIVE
SODIUM SERPL-SCNC: 139 MMOL/L (ref 136–145)
SP GR UR STRIP.AUTO: 1.03 (ref 1–1.03)
SQUAMOUS #/AREA URNS LPF: ABNORMAL /HPF
TRIGL SERPL-MCNC: 114 MG/DL (ref 34–140)
UROBILINOGEN UR STRIP-ACNC: NORMAL
VLDLC SERPL CALC-MCNC: 23 MG/DL
WBC # SPEC AUTO: 6.35 X10(3)/MCL (ref 4.5–11.5)
WBC #/AREA URNS AUTO: ABNORMAL /HPF

## 2023-09-18 PROCEDURE — 82043 UR ALBUMIN QUANTITATIVE: CPT

## 2023-09-18 PROCEDURE — 85025 COMPLETE CBC W/AUTO DIFF WBC: CPT

## 2023-09-18 PROCEDURE — 80053 COMPREHEN METABOLIC PANEL: CPT

## 2023-09-18 PROCEDURE — 83036 HEMOGLOBIN GLYCOSYLATED A1C: CPT

## 2023-09-18 PROCEDURE — 80061 LIPID PANEL: CPT

## 2023-09-18 PROCEDURE — 81001 URINALYSIS AUTO W/SCOPE: CPT

## 2023-09-18 PROCEDURE — 36415 COLL VENOUS BLD VENIPUNCTURE: CPT

## 2023-09-21 ENCOUNTER — CLINICAL SUPPORT (OUTPATIENT)
Dept: INTERNAL MEDICINE | Facility: CLINIC | Age: 58
End: 2023-09-21
Attending: NURSE PRACTITIONER
Payer: COMMERCIAL

## 2023-09-21 ENCOUNTER — OFFICE VISIT (OUTPATIENT)
Dept: INTERNAL MEDICINE | Facility: CLINIC | Age: 58
End: 2023-09-21
Payer: COMMERCIAL

## 2023-09-21 VITALS
WEIGHT: 292 LBS | DIASTOLIC BLOOD PRESSURE: 80 MMHG | SYSTOLIC BLOOD PRESSURE: 132 MMHG | TEMPERATURE: 99 F | RESPIRATION RATE: 16 BRPM | HEIGHT: 67 IN | BODY MASS INDEX: 45.83 KG/M2 | HEART RATE: 69 BPM

## 2023-09-21 DIAGNOSIS — Z12.5 PROSTATE CANCER SCREENING: ICD-10-CM

## 2023-09-21 DIAGNOSIS — Z23 IMMUNIZATION DUE: ICD-10-CM

## 2023-09-21 DIAGNOSIS — Z00.00 WELLNESS EXAMINATION: ICD-10-CM

## 2023-09-21 DIAGNOSIS — E11.9 TYPE 2 DIABETES MELLITUS WITHOUT COMPLICATION, WITHOUT LONG-TERM CURRENT USE OF INSULIN: Primary | ICD-10-CM

## 2023-09-21 DIAGNOSIS — E11.9 TYPE 2 DIABETES MELLITUS WITHOUT COMPLICATION, WITHOUT LONG-TERM CURRENT USE OF INSULIN: ICD-10-CM

## 2023-09-21 DIAGNOSIS — N52.9 ERECTILE DYSFUNCTION, UNSPECIFIED ERECTILE DYSFUNCTION TYPE: ICD-10-CM

## 2023-09-21 DIAGNOSIS — E78.2 MIXED HYPERLIPIDEMIA: ICD-10-CM

## 2023-09-21 DIAGNOSIS — I10 PRIMARY HYPERTENSION: ICD-10-CM

## 2023-09-21 LAB
LEFT EYE DM RETINOPATHY: NEGATIVE
RIGHT EYE DM RETINOPATHY: NEGATIVE

## 2023-09-21 PROCEDURE — 3066F PR DOCUMENTATION OF TREATMENT FOR NEPHROPATHY: ICD-10-PCS | Mod: CPTII,,, | Performed by: NURSE PRACTITIONER

## 2023-09-21 PROCEDURE — 1160F PR REVIEW ALL MEDS BY PRESCRIBER/CLIN PHARMACIST DOCUMENTED: ICD-10-PCS | Mod: CPTII,,, | Performed by: NURSE PRACTITIONER

## 2023-09-21 PROCEDURE — 3075F PR MOST RECENT SYSTOLIC BLOOD PRESS GE 130-139MM HG: ICD-10-PCS | Mod: CPTII,,, | Performed by: NURSE PRACTITIONER

## 2023-09-21 PROCEDURE — 3008F PR BODY MASS INDEX (BMI) DOCUMENTED: ICD-10-PCS | Mod: CPTII,,, | Performed by: NURSE PRACTITIONER

## 2023-09-21 PROCEDURE — 3051F PR MOST RECENT HEMOGLOBIN A1C LEVEL 7.0 - < 8.0%: ICD-10-PCS | Mod: CPTII,,, | Performed by: NURSE PRACTITIONER

## 2023-09-21 PROCEDURE — 99214 OFFICE O/P EST MOD 30 MIN: CPT | Mod: S$PBB,,, | Performed by: NURSE PRACTITIONER

## 2023-09-21 PROCEDURE — 3008F BODY MASS INDEX DOCD: CPT | Mod: CPTII,,, | Performed by: NURSE PRACTITIONER

## 2023-09-21 PROCEDURE — 3061F PR NEG MICROALBUMINURIA RESULT DOCUMENTED/REVIEW: ICD-10-PCS | Mod: CPTII,,, | Performed by: NURSE PRACTITIONER

## 2023-09-21 PROCEDURE — 4010F ACE/ARB THERAPY RXD/TAKEN: CPT | Mod: CPTII,,, | Performed by: NURSE PRACTITIONER

## 2023-09-21 PROCEDURE — 3066F NEPHROPATHY DOC TX: CPT | Mod: CPTII,,, | Performed by: NURSE PRACTITIONER

## 2023-09-21 PROCEDURE — 3079F DIAST BP 80-89 MM HG: CPT | Mod: CPTII,,, | Performed by: NURSE PRACTITIONER

## 2023-09-21 PROCEDURE — 92228 IMG RTA DETC/MNTR DS PHY/QHP: CPT | Mod: PBBFAC

## 2023-09-21 PROCEDURE — 3061F NEG MICROALBUMINURIA REV: CPT | Mod: CPTII,,, | Performed by: NURSE PRACTITIONER

## 2023-09-21 PROCEDURE — 3051F HG A1C>EQUAL 7.0%<8.0%: CPT | Mod: CPTII,,, | Performed by: NURSE PRACTITIONER

## 2023-09-21 PROCEDURE — 4010F PR ACE/ARB THEARPY RXD/TAKEN: ICD-10-PCS | Mod: CPTII,,, | Performed by: NURSE PRACTITIONER

## 2023-09-21 PROCEDURE — 90686 IIV4 VACC NO PRSV 0.5 ML IM: CPT | Mod: PBBFAC

## 2023-09-21 PROCEDURE — 3075F SYST BP GE 130 - 139MM HG: CPT | Mod: CPTII,,, | Performed by: NURSE PRACTITIONER

## 2023-09-21 PROCEDURE — 99214 OFFICE O/P EST MOD 30 MIN: CPT | Mod: 25,PBBFAC | Performed by: NURSE PRACTITIONER

## 2023-09-21 PROCEDURE — 90471 IMMUNIZATION ADMIN: CPT | Mod: PBBFAC

## 2023-09-21 PROCEDURE — 3079F PR MOST RECENT DIASTOLIC BLOOD PRESSURE 80-89 MM HG: ICD-10-PCS | Mod: CPTII,,, | Performed by: NURSE PRACTITIONER

## 2023-09-21 PROCEDURE — 1160F RVW MEDS BY RX/DR IN RCRD: CPT | Mod: CPTII,,, | Performed by: NURSE PRACTITIONER

## 2023-09-21 PROCEDURE — 1159F MED LIST DOCD IN RCRD: CPT | Mod: CPTII,,, | Performed by: NURSE PRACTITIONER

## 2023-09-21 PROCEDURE — 99214 PR OFFICE/OUTPT VISIT, EST, LEVL IV, 30-39 MIN: ICD-10-PCS | Mod: S$PBB,,, | Performed by: NURSE PRACTITIONER

## 2023-09-21 PROCEDURE — 1159F PR MEDICATION LIST DOCUMENTED IN MEDICAL RECORD: ICD-10-PCS | Mod: CPTII,,, | Performed by: NURSE PRACTITIONER

## 2023-09-21 RX ORDER — LISINOPRIL AND HYDROCHLOROTHIAZIDE 12.5; 2 MG/1; MG/1
1 TABLET ORAL DAILY
Qty: 90 TABLET | Refills: 1 | Status: SHIPPED | OUTPATIENT
Start: 2023-09-21 | End: 2024-03-28 | Stop reason: SDUPTHER

## 2023-09-21 RX ORDER — ROSUVASTATIN CALCIUM 40 MG/1
40 TABLET, COATED ORAL NIGHTLY
Qty: 90 TABLET | Refills: 1 | Status: SHIPPED | OUTPATIENT
Start: 2023-09-21 | End: 2024-03-28 | Stop reason: SDUPTHER

## 2023-09-21 RX ORDER — EZETIMIBE 10 MG/1
10 TABLET ORAL NIGHTLY
Qty: 90 TABLET | Refills: 1 | Status: SHIPPED | OUTPATIENT
Start: 2023-09-21 | End: 2024-03-28 | Stop reason: SDUPTHER

## 2023-09-21 RX ORDER — LISINOPRIL 20 MG/1
20 TABLET ORAL NIGHTLY
Qty: 90 TABLET | Refills: 1 | Status: SHIPPED | OUTPATIENT
Start: 2023-09-21 | End: 2024-03-28 | Stop reason: SDUPTHER

## 2023-09-21 RX ORDER — TADALAFIL 20 MG/1
20 TABLET ORAL DAILY PRN
Qty: 10 TABLET | Refills: 6 | Status: SHIPPED | OUTPATIENT
Start: 2023-09-21

## 2023-09-21 RX ADMIN — INFLUENZA VIRUS VACCINE 0.5 ML: 15; 15; 15; 15 SUSPENSION INTRAMUSCULAR at 07:09

## 2023-09-21 NOTE — PROGRESS NOTES
Jesus Martinez is a 58 y.o. male here for a diabetic eye screening with non-dilated fundus photos per FANG Stevenson.    Patient cooperative?: Yes  Small pupils?: No  Last eye exam: unknown    For exam results, see Encounter Report.

## 2023-09-21 NOTE — PROGRESS NOTES
FANG Harkins   [facility]  [address] [facility] [address]      PATIENT NAME: Jesus Martinez  : 1965  DATE: 23  MRN: [id number]      Reason for Visit / Chief Complaint: Diabetes (LAB REVIEW )       History of Present Illness / Problem Focused Workflow     Jesus Martinez presents to the clinic with Diabetes (LAB REVIEW )     57 yo AAM here today for f/u  PMHx includes mild CAD, HLD, HTN, T2DM, Vitamin D deficiency, right knee pain, and ED. Former smoker.  <10 pack/yr history of smoking. Followed by [facility] and [facility]. Was referred to PT by [facility] - does not want to use.     Prostate Cancer Screening - 22 PSA 1.91  Colon Cancer Screening -  23 Cologuard Negative   Osteoporosis Screening - 22 Vitamin D level 42.2  HCV Screenin/15/22 Negative  Wellness Visit: 23  Pt here today for routine f/u for T2DM with labs completed. Labs reviewed, discussed elevated A1C, pt A1C has been less than 7 x 2 years, reports with increased rice consumption as of recently, reports he will work on this and we will continue to monitor as pt is not on any diabetes medication at this time. Pt agreeable to foot and fundal exams today. Will f/u in 6 months for routine wellness OV and prn. Agreeable to flu shot today.   Denies chest pain, shortness of breath, cough, fever, headache, dizziness, weakness, abdominal pain, nausea, vomiting, diarrhea, constipation, black/bloody stools, unplanned weight loss, night sweats, changes in urinary patterns, burning/odor with urination, depression, anxiety, and SI/HI.             Review of Systems     Review of Systems   Constitutional: Negative.    HENT: Negative.     Eyes: Negative.    Respiratory: Negative.     Cardiovascular: Negative.    Gastrointestinal: Negative.    Endocrine: Negative.    Genitourinary: Negative.    Neurological: Negative.    Psychiatric/Behavioral:  Negative.           Medications and Allergies     Medications  Medication List with Changes/Refills   Current Medications    INDOMETHACIN (INDOCIN) 50 MG CAPSULE    Take 1 capsule (50 mg total) by mouth 3 (three) times daily with meals.    MENTHOL 3.5 % GEL    Apply topically.   Changed and/or Refilled Medications    Modified Medication Previous Medication    EZETIMIBE (ZETIA) 10 MG TABLET ezetimibe (ZETIA) 10 mg tablet       Take 1 tablet (10 mg total) by mouth every evening. For High Cholesterol    Take 1 tablet (10 mg total) by mouth every evening. For High Cholesterol    LISINOPRIL (PRINIVIL,ZESTRIL) 20 MG TABLET lisinopriL (PRINIVIL,ZESTRIL) 20 MG tablet       Take 1 tablet (20 mg total) by mouth every evening. For High Blood Pressure    Take 1 tablet (20 mg total) by mouth every evening. For High Blood Pressure    LISINOPRIL-HYDROCHLOROTHIAZIDE (PRINZIDE,ZESTORETIC) 20-12.5 MG PER TABLET lisinopriL-hydrochlorothiazide (PRINZIDE,ZESTORETIC) 20-12.5 mg per tablet       Take 1 tablet by mouth once daily. For High Blood Pressure    Take 1 tablet by mouth once daily. For High Blood Pressure    ROSUVASTATIN (CRESTOR) 40 MG TAB rosuvastatin (CRESTOR) 40 MG Tab       Take 1 tablet (40 mg total) by mouth every evening. For High Cholesterol    Take 1 tablet (40 mg total) by mouth every evening. For High Cholesterol    TADALAFIL (CIALIS) 20 MG TAB tadalafiL (CIALIS) 20 MG Tab       Take 1 tablet (20 mg total) by mouth daily as needed (Erectile Dysfunction).    Take 1 tablet (20 mg total) by mouth daily as needed (Erectile Dysfunction).   Discontinued Medications    ACETAMINOPHEN (TYLENOL) 325 MG TABLET    Take 650 mg by mouth.         Allergies  Review of patient's allergies indicates:  No Known Allergies    Physical Examination     Vitals:    09/21/23 0746   BP: 132/80   Pulse: 69   Resp: 16   Temp: 98.7 °F (37.1 °C)     Physical Exam  Constitutional:       Appearance: Normal appearance.   Cardiovascular:      Rate and  Rhythm: Normal rate and regular rhythm.      Pulses:           Dorsalis pedis pulses are 2+ on the right side and 2+ on the left side.        Posterior tibial pulses are 2+ on the right side and 2+ on the left side.   Pulmonary:      Effort: Pulmonary effort is normal.      Breath sounds: Normal breath sounds.   Musculoskeletal:         General: Normal range of motion.      Cervical back: Normal range of motion.   Feet:      Right foot:      Protective Sensation: 9 sites tested.  9 sites sensed.      Skin integrity: Skin integrity normal.      Toenail Condition: Right toenails are normal.      Left foot:      Protective Sensation: 9 sites tested.  9 sites sensed.      Skin integrity: Skin integrity normal.      Toenail Condition: Left toenails are normal.   Skin:     General: Skin is warm and dry.   Neurological:      General: No focal deficit present.      Mental Status: He is alert and oriented to person, place, and time.   Psychiatric:         Mood and Affect: Mood normal.           Results     Lab Results   Component Value Date    WBC 6.35 09/18/2023    RBC 4.97 09/18/2023    HGB 13.8 (L) 09/18/2023    HCT 42.8 09/18/2023    MCV 86.1 09/18/2023    MCH 27.8 09/18/2023    MCHC 32.2 (L) 09/18/2023    RDW 12.7 09/18/2023     09/18/2023    MPV 8.9 09/18/2023     Sodium Level   Date Value Ref Range Status   09/18/2023 139 136 - 145 mmol/L Final     Potassium Level   Date Value Ref Range Status   09/18/2023 4.2 3.5 - 5.1 mmol/L Final     Carbon Dioxide   Date Value Ref Range Status   09/18/2023 28 23 - 31 mmol/L Final     Blood Urea Nitrogen   Date Value Ref Range Status   09/18/2023 15.1 8.4 - 25.7 mg/dL Final     Creatinine   Date Value Ref Range Status   09/18/2023 0.89 0.73 - 1.18 mg/dL Final     Calcium Level Total   Date Value Ref Range Status   09/18/2023 9.3 8.8 - 10.0 mg/dL Final     Albumin Level   Date Value Ref Range Status   09/18/2023 3.5 3.4 - 4.8 g/dL Final     Bilirubin Total   Date Value Ref  "Range Status   09/18/2023 0.5 <=1.5 mg/dL Final     Alkaline Phosphatase   Date Value Ref Range Status   09/18/2023 48 40 - 150 unit/L Final     Aspartate Aminotransferase   Date Value Ref Range Status   09/18/2023 17 5 - 34 unit/L Final     Alanine Aminotransferase   Date Value Ref Range Status   09/18/2023 18 0 - 55 unit/L Final     Estimated GFR-Non    Date Value Ref Range Status   03/10/2022 >105 >=90      Lab Results   Component Value Date    CHOL 167 09/18/2023     Lab Results   Component Value Date    HDL 41 09/18/2023     No results found for: "LDLCALC"  Lab Results   Component Value Date    TRIG 114 09/18/2023     No results found for: "CHOLHDL"  Lab Results   Component Value Date    TSH 0.9541 09/19/2022     Lab Results   Component Value Date    PHUR 7.0 03/10/2022    PROTEINUA Trace (A) 09/18/2023    GLUCUA Normal 03/10/2022    KETONESU Negative 03/10/2022    OCCULTUA Negative 03/10/2022    NITRITE Negative 03/10/2022    LEUKOCYTESUR Negative 09/18/2023     Lab Results   Component Value Date    HGBA1C 7.6 (H) 09/18/2023    HGBA1C 6.4 03/16/2023    HGBA1C 6.4 12/15/2022           Assessment         ICD-10-CM ICD-9-CM   1. Type 2 diabetes mellitus without complication, without long-term current use of insulin  E11.9 250.00   2. Primary hypertension  I10 401.9   3. Mixed hyperlipidemia  E78.2 272.2   4. Immunization due  Z23 V05.9   5. Erectile dysfunction, unspecified erectile dysfunction type  N52.9 607.84   6. Wellness examination  Z00.00 V70.0   7. Prostate cancer screening  Z12.5 V76.44       Plan      Problem List Items Addressed This Visit          Cardiac/Vascular    Primary hypertension    Overview     Low Sodium Diet (Dash Diet - less than 2 grams of sodium per day).  Maintain healthy weight with goal BMI <30. Exercise 30 minutes per day 5 days per week.           Current Assessment & Plan     /80  Continue RX lisinopril 20 mg daily and hctz/lisinopril 12.5 / 20 daily          " Relevant Medications    lisinopriL (PRINIVIL,ZESTRIL) 20 MG tablet    lisinopriL-hydrochlorothiazide (PRINZIDE,ZESTORETIC) 20-12.5 mg per tablet    Mixed hyperlipidemia    Overview     Follow a low cholesterol, low saturated fat diet with less than 200 mg of cholesterol a day.   Avoid fried foods and high saturated fats (clinton, sausage, cookies, cakes, chips, cheese, whole milk, butter, mayonnaise, creamy dressings, gravy, stew, gumbo, boudin, cracklins and cream sauces).  Add flax seed or fish oil supplements to diet.   Increase dietary fiber.   Regular exercise improves cholesterol levels.  Physical activity 5 times a week for 30 minutes per day (or 150 minutes per week).   Stressed importance of dietary modifications.           Current Assessment & Plan     FLP WNL  Continue RX rosuvastatin 40 mg q hs and Zetia 10 mg daily          Relevant Medications    ezetimibe (ZETIA) 10 mg tablet    rosuvastatin (CRESTOR) 40 MG Tab    Other Relevant Orders    Lipid Panel       Renal/    Erectile dysfunction    Current Assessment & Plan     Cialsis 20 mg tab prn          Relevant Medications    tadalafiL (CIALIS) 20 MG Tab       Endocrine    Type 2 diabetes mellitus without complication, without long-term current use of insulin - Primary    Overview     Continue medications as prescribed.  Follow ADA diet.  Avoid soda, simple sweets, and limit rice/pasta/bread/starches and consume brown options when possible.   Maintain healthy weight with BMI goal <30.   Perform aerobic exercise for 150 minutes per week (or 5 days a week for 30 minutes each day).   Examine feet daily.            Current Assessment & Plan     A1C 7.6  Diet Controlled  Continue to monitor          Relevant Orders    Diabetic Eye Screening Photo    Hemoglobin A1C    MICROALBUMIN / CREATININE RATIO URINE     Other Visit Diagnoses       Immunization due        Relevant Medications    influenza (QUADRIVALENT PF) vaccine 0.5 mL (Completed) (Start on 9/21/2023   8:52 AM)    Wellness examination        Relevant Orders    TSH    Vitamin D    Comprehensive Metabolic Panel    Urinalysis, Reflex to Urine Culture    CBC Auto Differential    Prostate cancer screening        Relevant Orders    PSA, Screening            Future Appointments   Date Time Provider Department Center   3/28/2024  7:15 AM Annamarie Galicia FNP [facility]            Signature:     [facility]  [address] [facility] [address]    Date of encounter: 9/21/23     normal

## 2023-10-06 ENCOUNTER — HOSPITAL ENCOUNTER (EMERGENCY)
Facility: HOSPITAL | Age: 58
Discharge: HOME | End: 2023-10-06
Attending: STUDENT IN AN ORGANIZED HEALTH CARE EDUCATION/TRAINING PROGRAM
Payer: COMMERCIAL

## 2023-10-06 VITALS
RESPIRATION RATE: 18 BRPM | HEART RATE: 72 BPM | OXYGEN SATURATION: 97 % | SYSTOLIC BLOOD PRESSURE: 159 MMHG | DIASTOLIC BLOOD PRESSURE: 86 MMHG | BODY MASS INDEX: 46.67 KG/M2 | TEMPERATURE: 98 F | WEIGHT: 298 LBS

## 2023-10-06 DIAGNOSIS — N48.1 BALANITIS: Primary | ICD-10-CM

## 2023-10-06 PROCEDURE — 99283 EMERGENCY DEPT VISIT LOW MDM: CPT

## 2023-10-06 RX ORDER — CLOTRIMAZOLE 1 %
CREAM (GRAM) TOPICAL 2 TIMES DAILY
Qty: 45 G | Refills: 0 | Status: SHIPPED | OUTPATIENT
Start: 2023-10-06 | End: 2023-10-16

## 2023-10-06 NOTE — ED PROVIDER NOTES
"Encounter Date: 10/6/2023       History     Chief Complaint   Patient presents with    Penile Abrasion     Pt states has abrasions with swelling on  foreskin and penis after sex with his spouse 4 days ago     Patient is a 58-year-old  male with a past medical history of hyperlipidemia and hypertension who presented to the ER today due to erythema and pain at his foreskin.  Patient states he had sexual intercourse with his wife a few days prior and states that it "may have been dry. " patient states he would an abrasion over his uncircumcised penis but eventually become more erythematous and painful.  Patient denies any penile discharge or concerns for STIs.  He denies any dysuria, abdominal pain.  He denies any fever, chills cough congestion, chest pain shortness of breath.      Review of patient's allergies indicates:  No Known Allergies  Past Medical History:   Diagnosis Date    Hypertension     Mixed hyperlipidemia      Past Surgical History:   Procedure Laterality Date    CARDIAC CATHETERIZATION      2018     Family History   Problem Relation Age of Onset    Heart disease Mother     Heart disease Father      Social History     Tobacco Use    Smoking status: Former     Current packs/day: 0.00     Average packs/day: 0.3 packs/day for 7.0 years (1.8 ttl pk-yrs)     Types: Cigarettes     Start date:      Quit date:      Years since quittin.7    Smokeless tobacco: Never   Substance Use Topics    Alcohol use: Not Currently    Drug use: Never     Review of Systems   Constitutional:  Negative for chills, fatigue and fever.   HENT:  Negative for congestion, sore throat and trouble swallowing.    Eyes:  Negative for pain and visual disturbance.   Respiratory:  Negative for cough, shortness of breath and wheezing.    Cardiovascular:  Negative for chest pain and palpitations.   Gastrointestinal:  Negative for abdominal pain, blood in stool, constipation, diarrhea, nausea and vomiting. "   Genitourinary:  Positive for penile pain. Negative for decreased urine volume, dysuria, hematuria, penile discharge, penile swelling, scrotal swelling, testicular pain and urgency.   Musculoskeletal:  Negative for back pain and myalgias.   Skin:  Negative for rash and wound.   Neurological:  Negative for seizures, syncope and headaches.   Psychiatric/Behavioral:  Negative for confusion. The patient is not nervous/anxious.        Physical Exam     Initial Vitals [10/06/23 1723]   BP Pulse Resp Temp SpO2   (!) 159/86 72 18 98 °F (36.7 °C) 97 %      MAP       --         Physical Exam    Nursing note and vitals reviewed.  Constitutional: He appears well-developed and well-nourished. No distress.   HENT:   Head: Normocephalic and atraumatic.   Eyes: Conjunctivae and EOM are normal. Right eye exhibits no discharge. Left eye exhibits no discharge. No scleral icterus.   Neck: No tracheal deviation present.   Normal range of motion.  Cardiovascular:  Normal rate, regular rhythm and normal heart sounds.     Exam reveals no gallop and no friction rub.       No murmur heard.  Pulmonary/Chest: Breath sounds normal. No respiratory distress. He has no wheezes. He has no rhonchi. He has no rales.   Abdominal: Abdomen is soft. He exhibits no distension. There is no abdominal tenderness. There is no rebound and no guarding.   Genitourinary:    Genitourinary Comments: Uncircumcised penis when foreskin was retracted there was erythema under the foreskin with some Gene white accumulations under the foreskin consistent with balanitis.  No vesicular lesions appreciated.  No penile discharge appreciated.     Musculoskeletal:         General: No edema. Normal range of motion.      Cervical back: Normal range of motion.     Neurological: He is alert.   Skin: Skin is warm and dry. No rash and no abscess noted. No erythema. No pallor.   Psychiatric: His behavior is normal. Judgment normal.         ED Course   Procedures  Labs Reviewed - No  data to display       Imaging Results    None          Medications - No data to display  Medical Decision Making  Differentials: Balanitis, cellulitis, STI  Well-appearing 58-year-old male with stable vital signs presents with abrasion to his penis that is progressively.  Findings on physical exam are most consistent with candidal balanitis.  Clotrimazole topically 1% b.i.d. for 7 days sent pharmacy.  Advised patient keep area clean and dry and avoid/sees sexual intercourse for the next 1-2 weeks.  All questions were answered in layman's terms.  No concerns for STIs by the patient and I see no physical evidence of an STI.  Return precautions discussed and follow up with PCP is recommended                               Clinical Impression:   Final diagnoses:  [N48.1] Balanitis (Primary)        ED Disposition Condition    Discharge Stable          ED Prescriptions       Medication Sig Dispense Start Date End Date Auth. Provider    clotrimazole (LOTRIMIN) 1 % cream Apply topically 2 (two) times daily. for 10 days 45 g 10/6/2023 10/16/2023 Jacky Reynaga MD          Follow-up Information       Follow up With Specialties Details Why Contact Info    [facility] Emergency Medicine  If symptoms worsen [address] [facility] [address] [phone]    Annamarie Galicia, DAMIENP Nurse Practitioner Schedule an appointment as soon as possible for a visit   [address] [facility] [address] [phone]               Jacky Reynaga MD  10/06/23 2020

## 2023-11-03 ENCOUNTER — OFFICE VISIT (OUTPATIENT)
Dept: INTERNAL MEDICINE | Facility: CLINIC | Age: 58
End: 2023-11-03
Payer: COMMERCIAL

## 2023-11-03 DIAGNOSIS — N47.3 DEFICIENT FORESKIN: Primary | ICD-10-CM

## 2023-11-03 PROCEDURE — 1160F PR REVIEW ALL MEDS BY PRESCRIBER/CLIN PHARMACIST DOCUMENTED: ICD-10-PCS | Mod: CPTII,95,, | Performed by: NURSE PRACTITIONER

## 2023-11-03 PROCEDURE — 3061F NEG MICROALBUMINURIA REV: CPT | Mod: CPTII,95,, | Performed by: NURSE PRACTITIONER

## 2023-11-03 PROCEDURE — 4010F PR ACE/ARB THEARPY RXD/TAKEN: ICD-10-PCS | Mod: CPTII,95,, | Performed by: NURSE PRACTITIONER

## 2023-11-03 PROCEDURE — 3051F HG A1C>EQUAL 7.0%<8.0%: CPT | Mod: CPTII,95,, | Performed by: NURSE PRACTITIONER

## 2023-11-03 PROCEDURE — 1160F RVW MEDS BY RX/DR IN RCRD: CPT | Mod: CPTII,95,, | Performed by: NURSE PRACTITIONER

## 2023-11-03 PROCEDURE — 99213 OFFICE O/P EST LOW 20 MIN: CPT | Mod: 95,,, | Performed by: NURSE PRACTITIONER

## 2023-11-03 PROCEDURE — 1159F PR MEDICATION LIST DOCUMENTED IN MEDICAL RECORD: ICD-10-PCS | Mod: CPTII,95,, | Performed by: NURSE PRACTITIONER

## 2023-11-03 PROCEDURE — 3051F PR MOST RECENT HEMOGLOBIN A1C LEVEL 7.0 - < 8.0%: ICD-10-PCS | Mod: CPTII,95,, | Performed by: NURSE PRACTITIONER

## 2023-11-03 PROCEDURE — 3061F PR NEG MICROALBUMINURIA RESULT DOCUMENTED/REVIEW: ICD-10-PCS | Mod: CPTII,95,, | Performed by: NURSE PRACTITIONER

## 2023-11-03 PROCEDURE — 1159F MED LIST DOCD IN RCRD: CPT | Mod: CPTII,95,, | Performed by: NURSE PRACTITIONER

## 2023-11-03 PROCEDURE — 4010F ACE/ARB THERAPY RXD/TAKEN: CPT | Mod: CPTII,95,, | Performed by: NURSE PRACTITIONER

## 2023-11-03 PROCEDURE — 3066F PR DOCUMENTATION OF TREATMENT FOR NEPHROPATHY: ICD-10-PCS | Mod: CPTII,95,, | Performed by: NURSE PRACTITIONER

## 2023-11-03 PROCEDURE — 3066F NEPHROPATHY DOC TX: CPT | Mod: CPTII,95,, | Performed by: NURSE PRACTITIONER

## 2023-11-03 PROCEDURE — 99213 PR OFFICE/OUTPT VISIT, EST, LEVL III, 20-29 MIN: ICD-10-PCS | Mod: 95,,, | Performed by: NURSE PRACTITIONER

## 2023-11-03 NOTE — PROGRESS NOTES
FANG Harkins   [facility]  [address] [facility] [address]      PATIENT NAME: Jesus Martinez  : 1965  DATE: 11/3/23  MRN: [id number]      Patient PCP Information       Provider PCP Type    [facility]ANG Harkins [facility]            Reason for Visit / Chief Complaint: Health Maintenance (Lab review )       History of Present Illness / Problem Focused Workflow     Jesus Martinez presents to the clinic with Health Maintenance (Lab review )     59 yo AAM here today for f/u  Medical problems includes mild CAD, HLD, HTN, T2DM, Vitamin D deficiency, right knee pain, and ED. Former smoker.  <10 pack/yr history of smoking. Followed by [facility] and [facility]. Was referred to PT by [facility] - does not want to use.     Prostate Cancer Screening - 22 PSA 1.91  Colon Cancer Screening -  23 Cologuard Negative   Osteoporosis Screening - 22 Vitamin D level 42.2  HCV Screenin/15/22 Negative  Wellness Visit: 23  Pt here today for routine f/u for T2DM with labs completed. Labs reviewed, discussed elevated A1C, pt A1C has been less than 7 x 2 years, reports with increased rice consumption as of recently, reports he will work on this and we will continue to monitor as pt is not on any diabetes medication at this time. Pt agreeable to foot and fundal exams today. Will f/u in 6 months for routine wellness OV and prn. Agreeable to flu shot today.     2023  Pt here today for in between OV for ED f/u from 10/06/23. Pt contacted clinci and informed nurse he was usring Clotrimazole 1% from the ER as he was diagnosed with a yeast infection on his penis. He reported the yeast infection appeared to be gone completely and he had cream leftover. He reported having tightening of his foreskin and dealing with scarring. Pt reports the area is tight when he attempts to pull it back. Denies any urinary problems. Does reports he has  some scabs at times. Reports some discomfort at times as well. Pt reports he was not circumcised as a child. Agreeable to referral to Urology clinic for evaluation.   Denies chest pain, shortness of breath, cough, fever, headache, dizziness, weakness, abdominal pain, nausea, vomiting, diarrhea, constipation, black/bloody stools, unplanned weight loss, night sweats, changes in urinary patterns, burning/odor with urination, depression, anxiety, and SI/HI.             Review of Systems     Review of Systems   Constitutional: Negative.    HENT: Negative.     Eyes: Negative.    Respiratory: Negative.     Cardiovascular: Negative.    Gastrointestinal: Negative.    Endocrine: Negative.    Genitourinary: Negative.    Skin:  Positive for wound.   Neurological: Negative.    Psychiatric/Behavioral: Negative.           Medications and Allergies     Medications  Current Outpatient Medications   Medication Instructions    clotrimazole (LOTRIMIN) 1 % cream Topical (Top), 2 times daily    ezetimibe (ZETIA) 10 mg, Oral, Nightly, For High Cholesterol    indomethacin (INDOCIN) 50 mg, Oral, 3 times daily with meals    lisinopriL (PRINIVIL,ZESTRIL) 20 mg, Oral, Nightly, For High Blood Pressure    lisinopriL-hydrochlorothiazide (PRINZIDE,ZESTORETIC) 20-12.5 mg per tablet 1 tablet, Oral, Daily, For High Blood Pressure    menthol 3.5 % Gel Topical    rosuvastatin (CRESTOR) 40 mg, Oral, Nightly, For High Cholesterol    tadalafiL (CIALIS) 20 mg, Oral, Daily PRN         Allergies  Review of patient's allergies indicates:  No Known Allergies    Physical Examination     There were no vitals taken for this visit.    Physical Exam  HENT:      Right Ear: Hearing normal.      Left Ear: Hearing normal.   Neurological:      Mental Status: He is alert and oriented to person, place, and time.   Psychiatric:         Mood and Affect: Mood normal.           Results     Lab Results   Component Value Date    WBC 6.35 09/18/2023    RBC 4.97 09/18/2023    HGB  "13.8 (L) 09/18/2023    HCT 42.8 09/18/2023    MCV 86.1 09/18/2023    MCH 27.8 09/18/2023    MCHC 32.2 (L) 09/18/2023    RDW 12.7 09/18/2023     09/18/2023    MPV 8.9 09/18/2023     CMP  Sodium Level   Date Value Ref Range Status   09/18/2023 139 136 - 145 mmol/L Final     Potassium Level   Date Value Ref Range Status   09/18/2023 4.2 3.5 - 5.1 mmol/L Final     Carbon Dioxide   Date Value Ref Range Status   09/18/2023 28 23 - 31 mmol/L Final     Blood Urea Nitrogen   Date Value Ref Range Status   09/18/2023 15.1 8.4 - 25.7 mg/dL Final     Creatinine   Date Value Ref Range Status   09/18/2023 0.89 0.73 - 1.18 mg/dL Final     Calcium Level Total   Date Value Ref Range Status   09/18/2023 9.3 8.8 - 10.0 mg/dL Final     Albumin Level   Date Value Ref Range Status   09/18/2023 3.5 3.4 - 4.8 g/dL Final     Bilirubin Total   Date Value Ref Range Status   09/18/2023 0.5 <=1.5 mg/dL Final     Alkaline Phosphatase   Date Value Ref Range Status   09/18/2023 48 40 - 150 unit/L Final     Aspartate Aminotransferase   Date Value Ref Range Status   09/18/2023 17 5 - 34 unit/L Final     Alanine Aminotransferase   Date Value Ref Range Status   09/18/2023 18 0 - 55 unit/L Final     Estimated GFR-Non    Date Value Ref Range Status   03/10/2022 >105 >=90      Lab Results   Component Value Date    CHOL 167 09/18/2023     Lab Results   Component Value Date    HDL 41 09/18/2023     No results found for: "LDLCALC"  Lab Results   Component Value Date    TRIG 114 09/18/2023     No results found for: "CHOLHDL"  Lab Results   Component Value Date    TSH 0.9541 09/19/2022         Assessment        ICD-10-CM ICD-9-CM   1. Deficient foreskin  N47.3 605        Plan      Problem List Items Addressed This Visit          Renal/    Deficient foreskin - Primary    Relevant Orders    Ambulatory referral/consult to Urology       Future Appointments   Date Time Provider Department Center   11/14/2023 12:15 PM Annamarie Galicia, FNP [facility] " [facility]   3/28/2024  7:15 AM Annamarie Galicia, [facility]        Follow up if symptoms worsen or fail to improve.      Signature:     [facility]  [address] [facility]  [place] [address]    Date of encounter: 11/3/23    Audio Only Telehealth Visit     The patient location is: home  The chief complaint leading to consultation is: penile complaint  Visit type: Virtual visit with audio only (telephone)  Total time spent with patient: 22     The reason for the audio only service rather than synchronous audio and video virtual visit was related to technical difficulties or patient preference/necessity.     Each patient to whom I provide medical services by telemedicine is:  (1) informed of the relationship between the physician and patient and the respective role of any other health care provider with respect to management of the patient; and (2) notified that they may decline to receive medical services by telemedicine and may withdraw from such care at any time. Patient verbally consented to receive this service via voice-only telephone call.         This service was not originating from a related E/M service provided within the previous 7 days nor will  to an E/M service or procedure within the next 24 hours or my soonest available appointment.  Prevailing standard of care was able to be met in this audio-only visit.

## 2023-11-10 ENCOUNTER — NURSE TRIAGE (OUTPATIENT)
Dept: ADMINISTRATIVE | Facility: CLINIC | Age: 58
End: 2023-11-10
Payer: COMMERCIAL

## 2023-11-10 NOTE — TELEPHONE ENCOUNTER
Pt reports seen in the ED 10/06/23 at [facility], had a yeast infection (penile, uncircumcised). Pt states he did see his PCP in office and was referred to see an urologist, but now is having pain with urination on top of the foreskin being pulled back and stuck due to some scarring. Pt advised to go to the ED now per protocol, Pt encouraged to call back with any worsening symptoms or questions. Pt verbalized understanding.    Reason for Disposition   [1] Not circumcised AND [2] foreskin pulled back and stuck    Additional Information   Negative: [1] Blood from end of penis AND [2] large amount    Protocols used: Penis and Scrotum Symptoms-A-AH

## 2023-11-11 ENCOUNTER — HOSPITAL ENCOUNTER (EMERGENCY)
Facility: HOSPITAL | Age: 58
Discharge: HOME | End: 2023-11-11
Attending: EMERGENCY MEDICINE
Payer: COMMERCIAL

## 2023-11-11 VITALS
SYSTOLIC BLOOD PRESSURE: 140 MMHG | WEIGHT: 268 LBS | RESPIRATION RATE: 17 BRPM | TEMPERATURE: 98 F | HEIGHT: 67 IN | HEART RATE: 94 BPM | DIASTOLIC BLOOD PRESSURE: 79 MMHG | BODY MASS INDEX: 42.06 KG/M2 | OXYGEN SATURATION: 95 %

## 2023-11-11 DIAGNOSIS — A60.02 HERPES GENITALIS IN MEN: Primary | ICD-10-CM

## 2023-11-11 PROCEDURE — 99283 EMERGENCY DEPT VISIT LOW MDM: CPT

## 2023-11-11 RX ORDER — FAMCICLOVIR 500 MG/1
500 TABLET ORAL 3 TIMES DAILY
Qty: 21 TABLET | Refills: 0 | Status: SHIPPED | OUTPATIENT
Start: 2023-11-11 | End: 2023-11-18

## 2023-11-11 RX ORDER — FAMCICLOVIR 500 MG/1
500 TABLET ORAL 3 TIMES DAILY
Qty: 21 TABLET | Refills: 0 | Status: SHIPPED | OUTPATIENT
Start: 2023-11-11 | End: 2023-11-11 | Stop reason: SDUPTHER

## 2023-11-11 NOTE — ED PROVIDER NOTES
Encounter Date: 2023       History     Chief Complaint   Patient presents with    Groin Swelling     Pt reports on 10/6 he was dx with balanitis and put on clotrimazole for 2 weeks; states he completed his 2 weeks of medication and reports his foreskin is still retracted and swollen. Pt reports painful urination as well.      This 58-year-old man presents with balanitis.  He apparently was seen on  then at that time had a cottage cheese type discharge and he was treated with anti fungal medication with some success.  However the appearance of the infection has changed.  He now has numerous ulcerations of the foreskin which are painful.       Review of patient's allergies indicates:  No Known Allergies  Past Medical History:   Diagnosis Date    Hypertension     Mixed hyperlipidemia      Past Surgical History:   Procedure Laterality Date    CARDIAC CATHETERIZATION           Family History   Problem Relation Age of Onset    Heart disease Mother     Heart disease Father      Social History     Tobacco Use    Smoking status: Former     Current packs/day: 0.00     Average packs/day: 0.3 packs/day for 7.0 years (1.8 ttl pk-yrs)     Types: Cigarettes     Start date:      Quit date:      Years since quittin.8    Smokeless tobacco: Never   Substance Use Topics    Alcohol use: Not Currently    Drug use: Never     Review of Systems    Physical Exam     Initial Vitals [23 1401]   BP Pulse Resp Temp SpO2   (!) 140/79 94 17 97.6 °F (36.4 °C) 95 %      MAP       --         Physical Exam    Constitutional: He appears well-developed and well-nourished.   HENT:   Head: Normocephalic and atraumatic.   Mouth/Throat: Mucous membranes are normal.   Eyes: EOM are normal. Pupils are equal, round, and reactive to light.   Neck: Neck supple.   Normal range of motion.  Cardiovascular:  Normal rate, regular rhythm, normal heart sounds and intact distal pulses.           Pulmonary/Chest: Breath sounds  normal.   Abdominal: Abdomen is soft. Bowel sounds are normal.   Genitourinary:    Genitourinary Comments: The patient's foreskin has about 15 small 2-3 mm ulcerations.      Musculoskeletal:         General: Normal range of motion.      Cervical back: Normal range of motion and neck supple.     Neurological: He is alert and oriented to person, place, and time. He has normal strength.   Skin: Skin is warm and dry. Capillary refill takes less than 2 seconds.   Psychiatric: He has a normal mood and affect. His behavior is normal. Judgment and thought content normal.         ED Course   Procedures  Labs Reviewed - No data to display       Imaging Results    None          Medications - No data to display  Medical Decision Making                             Clinical Impression:   Final diagnoses:  [A60.02] Herpes genitalis in men (Primary)        ED Disposition Condition    Discharge Stable          ED Prescriptions       Medication Sig Dispense Start Date End Date Auth. Provider    famciclovir (FAMVIR) 500 MG tablet Take 1 tablet (500 mg total) by mouth 3 (three) times daily. for 7 days 21 tablet 11/11/2023 11/18/2023 Juwan Milian MD          Follow-up Information       Follow up With Specialties Details Why Contact Annamarie George FNP Nurse Practitioner   [address] [facility][1] [address] [phone]      Keep follow up with Urology as scheduled in december.                 Juwan Milian MD  11/11/23 4513

## 2023-11-14 ENCOUNTER — OFFICE VISIT (OUTPATIENT)
Dept: INTERNAL MEDICINE | Facility: CLINIC | Age: 58
End: 2023-11-14
Payer: COMMERCIAL

## 2023-11-14 VITALS
DIASTOLIC BLOOD PRESSURE: 87 MMHG | HEART RATE: 94 BPM | SYSTOLIC BLOOD PRESSURE: 134 MMHG | RESPIRATION RATE: 16 BRPM | HEIGHT: 67 IN | TEMPERATURE: 98 F | BODY MASS INDEX: 41.28 KG/M2 | WEIGHT: 263 LBS

## 2023-11-14 DIAGNOSIS — I10 PRIMARY HYPERTENSION: Primary | ICD-10-CM

## 2023-11-14 PROCEDURE — 3066F PR DOCUMENTATION OF TREATMENT FOR NEPHROPATHY: ICD-10-PCS | Mod: CPTII,,, | Performed by: NURSE PRACTITIONER

## 2023-11-14 PROCEDURE — 99214 OFFICE O/P EST MOD 30 MIN: CPT | Mod: PBBFAC | Performed by: NURSE PRACTITIONER

## 2023-11-14 PROCEDURE — 4010F PR ACE/ARB THEARPY RXD/TAKEN: ICD-10-PCS | Mod: CPTII,,, | Performed by: NURSE PRACTITIONER

## 2023-11-14 PROCEDURE — 99213 PR OFFICE/OUTPT VISIT, EST, LEVL III, 20-29 MIN: ICD-10-PCS | Mod: S$PBB,,, | Performed by: NURSE PRACTITIONER

## 2023-11-14 PROCEDURE — 1159F PR MEDICATION LIST DOCUMENTED IN MEDICAL RECORD: ICD-10-PCS | Mod: CPTII,,, | Performed by: NURSE PRACTITIONER

## 2023-11-14 PROCEDURE — 3008F PR BODY MASS INDEX (BMI) DOCUMENTED: ICD-10-PCS | Mod: CPTII,,, | Performed by: NURSE PRACTITIONER

## 2023-11-14 PROCEDURE — 4010F ACE/ARB THERAPY RXD/TAKEN: CPT | Mod: CPTII,,, | Performed by: NURSE PRACTITIONER

## 2023-11-14 PROCEDURE — 1160F PR REVIEW ALL MEDS BY PRESCRIBER/CLIN PHARMACIST DOCUMENTED: ICD-10-PCS | Mod: CPTII,,, | Performed by: NURSE PRACTITIONER

## 2023-11-14 PROCEDURE — 3061F NEG MICROALBUMINURIA REV: CPT | Mod: CPTII,,, | Performed by: NURSE PRACTITIONER

## 2023-11-14 PROCEDURE — 99213 OFFICE O/P EST LOW 20 MIN: CPT | Mod: S$PBB,,, | Performed by: NURSE PRACTITIONER

## 2023-11-14 PROCEDURE — 3061F PR NEG MICROALBUMINURIA RESULT DOCUMENTED/REVIEW: ICD-10-PCS | Mod: CPTII,,, | Performed by: NURSE PRACTITIONER

## 2023-11-14 PROCEDURE — 3079F PR MOST RECENT DIASTOLIC BLOOD PRESSURE 80-89 MM HG: ICD-10-PCS | Mod: CPTII,,, | Performed by: NURSE PRACTITIONER

## 2023-11-14 PROCEDURE — 3079F DIAST BP 80-89 MM HG: CPT | Mod: CPTII,,, | Performed by: NURSE PRACTITIONER

## 2023-11-14 PROCEDURE — 3075F PR MOST RECENT SYSTOLIC BLOOD PRESS GE 130-139MM HG: ICD-10-PCS | Mod: CPTII,,, | Performed by: NURSE PRACTITIONER

## 2023-11-14 PROCEDURE — 3051F HG A1C>EQUAL 7.0%<8.0%: CPT | Mod: CPTII,,, | Performed by: NURSE PRACTITIONER

## 2023-11-14 PROCEDURE — 3008F BODY MASS INDEX DOCD: CPT | Mod: CPTII,,, | Performed by: NURSE PRACTITIONER

## 2023-11-14 PROCEDURE — 3075F SYST BP GE 130 - 139MM HG: CPT | Mod: CPTII,,, | Performed by: NURSE PRACTITIONER

## 2023-11-14 PROCEDURE — 1159F MED LIST DOCD IN RCRD: CPT | Mod: CPTII,,, | Performed by: NURSE PRACTITIONER

## 2023-11-14 PROCEDURE — 3066F NEPHROPATHY DOC TX: CPT | Mod: CPTII,,, | Performed by: NURSE PRACTITIONER

## 2023-11-14 PROCEDURE — 1160F RVW MEDS BY RX/DR IN RCRD: CPT | Mod: CPTII,,, | Performed by: NURSE PRACTITIONER

## 2023-11-14 PROCEDURE — 3051F PR MOST RECENT HEMOGLOBIN A1C LEVEL 7.0 - < 8.0%: ICD-10-PCS | Mod: CPTII,,, | Performed by: NURSE PRACTITIONER

## 2023-11-14 NOTE — PROGRESS NOTES
FANG Harkins   [facility]  [address] [facility] [address]      PATIENT NAME: Jesus Martinez  : 1965  DATE: 23  MRN: [id number]      Patient PCP Information       Provider PCP Type    [facility]ins [facility]            Reason for Visit / Chief Complaint: Hypertension       History of Present Illness / Problem Focused Workflow     Jesus Martinez presents to the clinic with Hypertension     57 yo AAM here today for f/u  Medical problems includes mild CAD, HLD, HTN, T2DM, Vitamin D deficiency, right knee pain, and ED. Former smoker.  <10 pack/yr history of smoking. Followed by [facility] and [facility]. Was referred to PT by [facility] - does not want to use.     Prostate Cancer Screening - 22 PSA 1.91  Colon Cancer Screening -  23 Cologuard Negative   Osteoporosis Screening - 22 Vitamin D level 42.2  HCV Screenin/15/22 Negative  Wellness Visit: 23  Pt here today for routine f/u for T2DM with labs completed. Labs reviewed, discussed elevated A1C, pt A1C has been less than 7 x 2 years, reports with increased rice consumption as of recently, reports he will work on this and we will continue to monitor as pt is not on any diabetes medication at this time. Pt agreeable to foot and fundal exams today. Will f/u in 6 months for routine wellness OV and prn. Agreeable to flu shot today.      2023  Pt here today for in between OV for ED f/u from 10/06/23. Pt contacted clinci and informed nurse he was usring Clotrimazole 1% from the ER as he was diagnosed with a yeast infection on his penis. He reported the yeast infection appeared to be gone completely and he had cream leftover. He reported having tightening of his foreskin and dealing with scarring. Pt reports the area is tight when he attempts to pull it back. Denies any urinary problems. Does reports he has some scabs at times. Reports some  "discomfort at times as well. Pt reports he was not circumcised as a child. Agreeable to referral to Urology clinic for evaluation.       11/14/2023  Pt here today for f/u for BP check; BP today 134/87 in clinic, pt reports compliance with medications; denies any acute issues today. Will f/u as previously scheduled next year.   Denies chest pain, shortness of breath, cough, fever, headache, dizziness, weakness, abdominal pain, nausea, vomiting, diarrhea, constipation, black/bloody stools, unplanned weight loss, night sweats, changes in urinary patterns, burning/odor with urination, depression, anxiety, and SI/HI.               Review of Systems     Review of Systems   Constitutional: Negative.    HENT: Negative.     Eyes: Negative.    Respiratory: Negative.     Cardiovascular: Negative.    Gastrointestinal: Negative.    Endocrine: Negative.    Genitourinary: Negative.    Neurological: Negative.    Psychiatric/Behavioral: Negative.           Medications and Allergies     Medications  Current Outpatient Medications   Medication Instructions    clotrimazole (LOTRIMIN) 1 % cream Topical (Top), 2 times daily    ezetimibe (ZETIA) 10 mg, Oral, Nightly, For High Cholesterol    famciclovir (FAMVIR) 500 mg, Oral, 3 times daily    lisinopriL (PRINIVIL,ZESTRIL) 20 mg, Oral, Nightly, For High Blood Pressure    lisinopriL-hydrochlorothiazide (PRINZIDE,ZESTORETIC) 20-12.5 mg per tablet 1 tablet, Oral, Daily, For High Blood Pressure    menthol 3.5 % Gel Topical    rosuvastatin (CRESTOR) 40 mg, Oral, Nightly, For High Cholesterol    tadalafiL (CIALIS) 20 mg, Oral, Daily PRN         Allergies  Review of patient's allergies indicates:  No Known Allergies    Physical Examination     Visit Vitals  /87   Pulse 94   Temp 98 °F (36.7 °C)   Resp 16   Ht 5' 7" (1.702 m)   Wt 119.3 kg (263 lb)   BMI 41.19 kg/m²       Physical Exam  Vitals reviewed.   Constitutional:       Appearance: Normal appearance. He is obese.   HENT:      Head: " Normocephalic.   Cardiovascular:      Rate and Rhythm: Normal rate and regular rhythm.      Pulses: Normal pulses.      Heart sounds: Normal heart sounds.   Pulmonary:      Effort: Pulmonary effort is normal.      Breath sounds: Normal breath sounds.   Abdominal:      General: Abdomen is flat.      Palpations: Abdomen is soft.   Musculoskeletal:         General: Normal range of motion.      Cervical back: Normal range of motion.   Skin:     General: Skin is warm and dry.   Neurological:      Mental Status: He is alert.   Psychiatric:         Mood and Affect: Mood normal.           Results     Lab Results   Component Value Date    WBC 6.35 09/18/2023    RBC 4.97 09/18/2023    HGB 13.8 (L) 09/18/2023    HCT 42.8 09/18/2023    MCV 86.1 09/18/2023    MCH 27.8 09/18/2023    MCHC 32.2 (L) 09/18/2023    RDW 12.7 09/18/2023     09/18/2023    MPV 8.9 09/18/2023     CMP  Sodium Level   Date Value Ref Range Status   09/18/2023 139 136 - 145 mmol/L Final     Potassium Level   Date Value Ref Range Status   09/18/2023 4.2 3.5 - 5.1 mmol/L Final     Carbon Dioxide   Date Value Ref Range Status   09/18/2023 28 23 - 31 mmol/L Final     Blood Urea Nitrogen   Date Value Ref Range Status   09/18/2023 15.1 8.4 - 25.7 mg/dL Final     Creatinine   Date Value Ref Range Status   09/18/2023 0.89 0.73 - 1.18 mg/dL Final     Calcium Level Total   Date Value Ref Range Status   09/18/2023 9.3 8.8 - 10.0 mg/dL Final     Albumin Level   Date Value Ref Range Status   09/18/2023 3.5 3.4 - 4.8 g/dL Final     Bilirubin Total   Date Value Ref Range Status   09/18/2023 0.5 <=1.5 mg/dL Final     Alkaline Phosphatase   Date Value Ref Range Status   09/18/2023 48 40 - 150 unit/L Final     Aspartate Aminotransferase   Date Value Ref Range Status   09/18/2023 17 5 - 34 unit/L Final     Alanine Aminotransferase   Date Value Ref Range Status   09/18/2023 18 0 - 55 unit/L Final     Estimated GFR-Non    Date Value Ref Range Status  "  03/10/2022 >105 >=90      Lab Results   Component Value Date    CHOL 167 09/18/2023     Lab Results   Component Value Date    HDL 41 09/18/2023     No results found for: "LDLCALC"  Lab Results   Component Value Date    TRIG 114 09/18/2023     No results found for: "CHOLHDL"  Lab Results   Component Value Date    TSH 0.9541 09/19/2022         Assessment        ICD-10-CM ICD-9-CM   1. Primary hypertension  I10 401.9        Plan      Problem List Items Addressed This Visit          Cardiac/Vascular    Primary hypertension - Primary    Overview     Low Sodium Diet (Dash Diet - less than 2 grams of sodium per day).  Maintain healthy weight with goal BMI <30. Exercise 30 minutes per day 5 days per week.           Current Assessment & Plan     /87 At Goal  Continue RX lisinopril 20 mg daily / hctz/lisinopril 12.5 / 20 mg daily              Future Appointments   Date Time Provider Department Center   12/11/2023  8:30 AM Vero Lugo DO [facility]   3/28/2024  7:15 AM [facility]nam[facility]icia [facility]        Follow up if symptoms worsen or fail to improve.      Signature:     [facility]  [address] [address]    Date of encounter: 11/14/23    "

## 2023-12-11 ENCOUNTER — OFFICE VISIT (OUTPATIENT)
Dept: UROLOGY | Facility: CLINIC | Age: 58
End: 2023-12-11
Payer: COMMERCIAL

## 2023-12-11 ENCOUNTER — TELEPHONE (OUTPATIENT)
Dept: UROLOGY | Facility: CLINIC | Age: 58
End: 2023-12-11

## 2023-12-11 ENCOUNTER — LAB VISIT (OUTPATIENT)
Dept: LAB | Facility: HOSPITAL | Age: 58
End: 2023-12-11
Attending: UROLOGY
Payer: COMMERCIAL

## 2023-12-11 VITALS
WEIGHT: 255.63 LBS | BODY MASS INDEX: 40.12 KG/M2 | HEIGHT: 67 IN | SYSTOLIC BLOOD PRESSURE: 137 MMHG | OXYGEN SATURATION: 99 % | TEMPERATURE: 98 F | DIASTOLIC BLOOD PRESSURE: 85 MMHG | HEART RATE: 77 BPM | RESPIRATION RATE: 18 BRPM

## 2023-12-11 DIAGNOSIS — N40.0 BPH WITHOUT OBSTRUCTION/LOWER URINARY TRACT SYMPTOMS: ICD-10-CM

## 2023-12-11 DIAGNOSIS — N47.1 PHIMOSIS: Primary | ICD-10-CM

## 2023-12-11 DIAGNOSIS — N48.1 BALANITIS: ICD-10-CM

## 2023-12-11 LAB
BILIRUB SERPL-MCNC: NORMAL MG/DL
BLOOD URINE, POC: NORMAL
COLOR, POC UA: YELLOW
GLUCOSE UR QL STRIP: 500
KETONES UR QL STRIP: 15
LEUKOCYTE ESTERASE URINE, POC: NORMAL
NITRITE, POC UA: NORMAL
PH, POC UA: 5.5
PROTEIN, POC: NORMAL
PSA SERPL-MCNC: 0.68 NG/ML
SPECIFIC GRAVITY, POC UA: >=1.03
UROBILINOGEN, POC UA: 0.2

## 2023-12-11 PROCEDURE — 3075F SYST BP GE 130 - 139MM HG: CPT | Mod: CPTII,,, | Performed by: UROLOGY

## 2023-12-11 PROCEDURE — 3008F BODY MASS INDEX DOCD: CPT | Mod: CPTII,,, | Performed by: UROLOGY

## 2023-12-11 PROCEDURE — 1159F PR MEDICATION LIST DOCUMENTED IN MEDICAL RECORD: ICD-10-PCS | Mod: CPTII,,, | Performed by: UROLOGY

## 2023-12-11 PROCEDURE — 84153 ASSAY OF PSA TOTAL: CPT

## 2023-12-11 PROCEDURE — 3079F PR MOST RECENT DIASTOLIC BLOOD PRESSURE 80-89 MM HG: ICD-10-PCS | Mod: CPTII,,, | Performed by: UROLOGY

## 2023-12-11 PROCEDURE — 3066F NEPHROPATHY DOC TX: CPT | Mod: CPTII,,, | Performed by: UROLOGY

## 2023-12-11 PROCEDURE — 99204 PR OFFICE/OUTPT VISIT, NEW, LEVL IV, 45-59 MIN: ICD-10-PCS | Mod: S$PBB,,, | Performed by: UROLOGY

## 2023-12-11 PROCEDURE — 99215 OFFICE O/P EST HI 40 MIN: CPT | Mod: PBBFAC | Performed by: UROLOGY

## 2023-12-11 PROCEDURE — 1160F RVW MEDS BY RX/DR IN RCRD: CPT | Mod: CPTII,,, | Performed by: UROLOGY

## 2023-12-11 PROCEDURE — 3061F PR NEG MICROALBUMINURIA RESULT DOCUMENTED/REVIEW: ICD-10-PCS | Mod: CPTII,,, | Performed by: UROLOGY

## 2023-12-11 PROCEDURE — 1160F PR REVIEW ALL MEDS BY PRESCRIBER/CLIN PHARMACIST DOCUMENTED: ICD-10-PCS | Mod: CPTII,,, | Performed by: UROLOGY

## 2023-12-11 PROCEDURE — 3075F PR MOST RECENT SYSTOLIC BLOOD PRESS GE 130-139MM HG: ICD-10-PCS | Mod: CPTII,,, | Performed by: UROLOGY

## 2023-12-11 PROCEDURE — 3061F NEG MICROALBUMINURIA REV: CPT | Mod: CPTII,,, | Performed by: UROLOGY

## 2023-12-11 PROCEDURE — 1159F MED LIST DOCD IN RCRD: CPT | Mod: CPTII,,, | Performed by: UROLOGY

## 2023-12-11 PROCEDURE — 4010F PR ACE/ARB THEARPY RXD/TAKEN: ICD-10-PCS | Mod: CPTII,,, | Performed by: UROLOGY

## 2023-12-11 PROCEDURE — 99204 OFFICE O/P NEW MOD 45 MIN: CPT | Mod: S$PBB,,, | Performed by: UROLOGY

## 2023-12-11 PROCEDURE — 3051F PR MOST RECENT HEMOGLOBIN A1C LEVEL 7.0 - < 8.0%: ICD-10-PCS | Mod: CPTII,,, | Performed by: UROLOGY

## 2023-12-11 PROCEDURE — 4010F ACE/ARB THERAPY RXD/TAKEN: CPT | Mod: CPTII,,, | Performed by: UROLOGY

## 2023-12-11 PROCEDURE — 3008F PR BODY MASS INDEX (BMI) DOCUMENTED: ICD-10-PCS | Mod: CPTII,,, | Performed by: UROLOGY

## 2023-12-11 PROCEDURE — 3051F HG A1C>EQUAL 7.0%<8.0%: CPT | Mod: CPTII,,, | Performed by: UROLOGY

## 2023-12-11 PROCEDURE — 3066F PR DOCUMENTATION OF TREATMENT FOR NEPHROPATHY: ICD-10-PCS | Mod: CPTII,,, | Performed by: UROLOGY

## 2023-12-11 PROCEDURE — 3079F DIAST BP 80-89 MM HG: CPT | Mod: CPTII,,, | Performed by: UROLOGY

## 2023-12-11 PROCEDURE — 81001 URINALYSIS AUTO W/SCOPE: CPT | Mod: PBBFAC | Performed by: UROLOGY

## 2023-12-11 PROCEDURE — 36415 COLL VENOUS BLD VENIPUNCTURE: CPT

## 2023-12-11 NOTE — PROGRESS NOTES
CC:  Phimosis    HPI:  Jesus Martinez is a 58 y.o. male being seen in consultation for phimosis.  He has had episodes of balanitits.  On 6 October he was treated with Clotrimazole.  He then returned to the ER with ulcerations and was diagnosed herpes and was given Famciclovir.  He states that as a late teenager he had this.  He cannot retract the foreskin easily.  He would like a circumcision.  He denies any history of prostate cancer.  His last PSA in September of 2022 was 1.91.  This was an increase from 0.58 one year prior to that.  He is not had a more recent PSA.  He has never had a BLANQUITA.    Urinalysis:  Results for orders placed or performed in visit on 12/11/23   POCT URINE DIPSTICK WITH MICROSCOPE, AUTOMATED   Result Value Ref Range    Color, UA Yellow     Spec Grav UA >=1.030     pH, UA 5.5     WBC, UA neg     Nitrite, UA neg     Protein, POC neg     Glucose,      Ketones, UA 15     Urobilinogen, UA 0.2     Bilirubin, POC neg     Blood, UA trace-intact      Microscopic: 0-1 RBC per HPF       Lab Results:  PSA History:    10/24/17 06:34 11/20/18 06:20 11/16/20 06:18 09/17/21 06:33 09/19/22 06:27   0.9 0.8 0.57 0.58 1.91     Data Review:  Note from referring provider, aJcky Reynaga MD dated 6 October 2023 and ER visit by Juwan Milian MD dated 11 November 2023.     ROS:  All systems reviewed and are negative except as documented in HPI and/or Assessment and Plan.     Patient Active Problem List:     Patient Active Problem List   Diagnosis    Mild CAD    Erectile dysfunction    Primary hypertension    Microscopic hematuria    Mixed hyperlipidemia    Morbid obesity    Plantar fasciitis    Vitamin D deficiency    Type 2 diabetes mellitus without complication, without long-term current use of insulin    SANCHEZ (dyspnea on exertion)    Bilateral lower extremity pain    Peripheral edema    Deficient foreskin        Past Medical History:  Past Medical History:   Diagnosis Date    Hypertension     Mixed  hyperlipidemia         Past Surgical History:  Past Surgical History:   Procedure Laterality Date    CARDIAC CATHETERIZATION      2018        Family History:  Family History   Problem Relation Age of Onset    Heart disease Mother     Heart disease Father         Social History:  Social History     Socioeconomic History    Marital status:    Tobacco Use    Smoking status: Former     Current packs/day: 0.00     Average packs/day: 0.3 packs/day for 7.0 years (1.8 ttl pk-yrs)     Types: Cigarettes     Start date:      Quit date:      Years since quittin.9    Smokeless tobacco: Never   Substance and Sexual Activity    Alcohol use: Not Currently    Drug use: Never    Sexual activity: Yes     Social Determinants of Health     Financial Resource Strain: Low Risk  (2023)    Overall Financial Resource Strain (CARDIA)     Difficulty of Paying Living Expenses: Not hard at all   Food Insecurity: No Food Insecurity (2023)    Hunger Vital Sign     Worried About Running Out of Food in the Last Year: Never true     Ran Out of Food in the Last Year: Never true   Transportation Needs: No Transportation Needs (2023)    PRAPARE - Transportation     Lack of Transportation (Medical): No     Lack of Transportation (Non-Medical): No   Physical Activity: Inactive (2023)    Exercise Vital Sign     Days of Exercise per Week: 0 days     Minutes of Exercise per Session: 0 min   Stress: No Stress Concern Present (2023)    [employer] - Occupational Stress Questionnaire     Feeling of Stress : Not at all   Social Connections: Moderately Isolated (2023)    Social Connection and Isolation Panel [NHANES]     Frequency of Communication with Friends and Family: Once a week     Frequency of Social Gatherings with Friends and Family: Once a week     Attends Yarsani Services: More than 4 times per year     Active Member of Clubs or Organizations: No     Attends Club or  Organization Meetings: Never     Marital Status:    Housing Stability: Low Risk  (9/21/2023)    Housing Stability Vital Sign     Unable to Pay for Housing in the Last Year: No     Number of Places Lived in the Last Year: 1     Unstable Housing in the Last Year: No        Allergies:  Review of patient's allergies indicates:  No Known Allergies     Objective:  Vitals:    12/11/23 0808   BP: 137/85   Pulse: 77   Resp: 18   Temp: 97.7 °F (36.5 °C)     General:  Well developed, well nourished adult male in no acute distress  Abdomen: Soft, nontender, no masses  Extremities:  No clubbing, cyanosis, or edema  Neurologic:  Grossly intact  Musculoskeletal:  Normal tone  Penis:  Circumcised, no lesions  Scrotum:  No hydrocele  Testicles:  Nontender, no masses  Epididymis:  Normal without masses  Prostate exam:  Nontender, symmetrical, no nodules  Rectal:  Normal rectal tone     Assessment:  1. Phimosis  - Ambulatory referral/consult to Urology  - POCT URINE DIPSTICK WITH MICROSCOPE, AUTOMATED  - Case Request Operating Room: CIRCUMCISION    2. Balanitis  - Case Request Operating Room: CIRCUMCISION    3. BPH without obstruction/lower urinary tract symptoms  - PSA, Total (Diagnostic); Future     Plan:  He desires a circumcision.  This was scheduled for 16 January 2023.  He understands that candidal balanitis will be resolved with a circumcision however I explained the diagnosis of herpes with him and make sure that he understood a circumcision will not eliminate this.  Will get a PSA today and we will call him with those results.    Follow-up:  As above.

## 2023-12-11 NOTE — PROGRESS NOTES
Patient seen by Dr. Vero Lugo. Patient to have SX on Jan 16, 2024. Consent to be signed the day of surgery. RTC 2 weeks post op.

## 2024-01-09 ENCOUNTER — ANESTHESIA EVENT (OUTPATIENT)
Dept: SURGERY | Facility: HOSPITAL | Age: 59
End: 2024-01-09
Payer: COMMERCIAL

## 2024-01-09 DIAGNOSIS — E11.9 TYPE 2 DIABETES MELLITUS WITHOUT COMPLICATION, WITHOUT LONG-TERM CURRENT USE OF INSULIN: ICD-10-CM

## 2024-01-09 DIAGNOSIS — Z01.818 PRE-OP EVALUATION: Primary | ICD-10-CM

## 2024-01-09 NOTE — ANESTHESIA PREPROCEDURE EVALUATION
01/11/2024  Jesus Martinez is a 58 y.o. male presenting for circumcision with a history of   -Mild CAD ([facility] 2018),   -HTN, HLD,   -obesity,   -T2DM    BETA-BLOCKER: NONE          New Orders for Anesthesia: CBG DOS,DM prot    Patient Active Problem List   Diagnosis    Mild CAD    Erectile dysfunction    Primary hypertension    Microscopic hematuria    Mixed hyperlipidemia    Morbid obesity    Plantar fasciitis    Vitamin D deficiency    Type 2 diabetes mellitus without complication, without long-term current use of insulin    SANCHEZ (dyspnea on exertion)    Bilateral lower extremity pain    Peripheral edema    Deficient foreskin       Pre-op Assessment    I have reviewed the NPO Status.      Review of Systems  Anesthesia Hx:  No problems with previous Anesthesia                Social:  Former Smoker       Cardiovascular:     Hypertension, well controlled   CAD  asymptomatic         hyperlipidemia                             Pulmonary:  Pulmonary Normal                       Renal/:  Renal/ Normal                 Hepatic/GI:  Hepatic/GI Normal                 Neurological:  Neurology Normal                                      Endocrine:  Diabetes, poorly controlled, type 2         Obesity / BMI > 30    Vitals:    02/06/24 0910 02/06/24 0920 02/06/24 0924   BP: (!) 145/77  (!) 145/77   Pulse: 73     Temp: 36.9 °C (98.4 °F)     TempSrc: Oral     SpO2: 98%     Weight:  105.3 kg (232 lb 3.2 oz)          Physical Exam  General: Alert, Cooperative and Well nourished    Airway:  Mallampati: II   Mouth Opening: Normal  TM Distance: Normal  Tongue: Normal  Neck ROM: Normal ROM    Dental:  Dentures    Chest/Lungs:  Clear to auscultation, Normal Respiratory Rate    Heart:  Rate: Normal  Rhythm: Regular Rhythm  Sounds: Normal     @ 0940 (2 units given)  Lab Results   Component Value Date    WBC 6.35 09/18/2023  "   HGB 13.8 (L) 09/18/2023    HCT 42.8 09/18/2023    MCV 86.1 09/18/2023     09/18/2023       CMP  Sodium Level   Date Value Ref Range Status   09/18/2023 139 136 - 145 mmol/L Final     Potassium Level   Date Value Ref Range Status   09/18/2023 4.2 3.5 - 5.1 mmol/L Final     Carbon Dioxide   Date Value Ref Range Status   09/18/2023 28 23 - 31 mmol/L Final     Blood Urea Nitrogen   Date Value Ref Range Status   09/18/2023 15.1 8.4 - 25.7 mg/dL Final     Creatinine   Date Value Ref Range Status   09/18/2023 0.89 0.73 - 1.18 mg/dL Final     Calcium Level Total   Date Value Ref Range Status   09/18/2023 9.3 8.8 - 10.0 mg/dL Final     Albumin Level   Date Value Ref Range Status   09/18/2023 3.5 3.4 - 4.8 g/dL Final     Bilirubin Total   Date Value Ref Range Status   09/18/2023 0.5 <=1.5 mg/dL Final     Alkaline Phosphatase   Date Value Ref Range Status   09/18/2023 48 40 - 150 unit/L Final     Aspartate Aminotransferase   Date Value Ref Range Status   09/18/2023 17 5 - 34 unit/L Final     Alanine Aminotransferase   Date Value Ref Range Status   09/18/2023 18 0 - 55 unit/L Final     eGFR   Date Value Ref Range Status   09/18/2023 >60 mls/min/1.73/m2 Final     Lab Results   Component Value Date    HGBA1C 7.6 (H) 09/18/2023         Cardiac records:  Left heart cath September 2018:  Mild CAD  Normal LVEDP  Recommendations:  Medical management  Aggressive risk factor management    Last Cards note w/ [facility] 10/4/2022:  "Mild CAD per [facility] September 2018  Denies chest pain  Continue Aspirin, Crestor, Zetia, and Lisinopril  Counseled on the importance of diet and exercise    SANCHEZ  Will order Echocardiogram  Echocardiogram   SULLY testing  Follow-up in cardiology clinic in 4 months"    Anesthesia Plan  Type of Anesthesia, risks & benefits discussed:    Anesthesia Type: Gen ETT  Intra-op Monitoring Plan: Standard ASA Monitors  Post Op Pain Control Plan: IV/PO Opioids PRN  Induction:  IV  Airway Plan: " Direct  Informed Consent: Informed consent signed with the Patient and all parties understand the risks and agree with anesthesia plan.  All questions answered. Patient consented to blood products? Yes  ASA Score: 3  Day of Surgery Review of History & Physical: H&P Update referred to the surgeon/provider.    Ready For Surgery From Anesthesia Perspective.     .

## 2024-01-11 ENCOUNTER — HOSPITAL ENCOUNTER (OUTPATIENT)
Dept: CARDIOLOGY | Facility: HOSPITAL | Age: 59
Discharge: HOME | End: 2024-01-11
Attending: NURSE PRACTITIONER
Payer: COMMERCIAL

## 2024-01-11 DIAGNOSIS — Z01.818 PRE-OP EVALUATION: ICD-10-CM

## 2024-01-11 PROCEDURE — 93005 ELECTROCARDIOGRAM TRACING: CPT

## 2024-01-16 ENCOUNTER — ANESTHESIA (OUTPATIENT)
Dept: SURGERY | Facility: HOSPITAL | Age: 59
End: 2024-01-16
Payer: COMMERCIAL

## 2024-02-06 ENCOUNTER — HOSPITAL ENCOUNTER (OUTPATIENT)
Facility: HOSPITAL | Age: 59
Discharge: HOME | End: 2024-02-06
Attending: UROLOGY | Admitting: UROLOGY
Payer: COMMERCIAL

## 2024-02-06 DIAGNOSIS — N48.1 BALANITIS: ICD-10-CM

## 2024-02-06 DIAGNOSIS — N47.1 PHIMOSIS: ICD-10-CM

## 2024-02-06 DIAGNOSIS — E11.9 TYPE 2 DIABETES MELLITUS WITHOUT COMPLICATION, WITHOUT LONG-TERM CURRENT USE OF INSULIN: Primary | ICD-10-CM

## 2024-02-06 DIAGNOSIS — Z01.818 PREOP EXAMINATION: ICD-10-CM

## 2024-02-06 LAB
POCT GLUCOSE: 249 MG/DL (ref 70–110)
POCT GLUCOSE: 268 MG/DL (ref 70–110)

## 2024-02-06 PROCEDURE — 36000707: Performed by: UROLOGY

## 2024-02-06 PROCEDURE — 88304 TISSUE EXAM BY PATHOLOGIST: CPT | Mod: TC | Performed by: UROLOGY

## 2024-02-06 PROCEDURE — D9220A PRA ANESTHESIA: Mod: CRNA,,, | Performed by: NURSE ANESTHETIST, CERTIFIED REGISTERED

## 2024-02-06 PROCEDURE — 37000009 HC ANESTHESIA EA ADD 15 MINS: Performed by: UROLOGY

## 2024-02-06 PROCEDURE — 63600175 PHARM REV CODE 636 W HCPCS: Performed by: SPECIALIST

## 2024-02-06 PROCEDURE — D9220A PRA ANESTHESIA: Mod: ANES,,, | Performed by: ANESTHESIOLOGY

## 2024-02-06 PROCEDURE — 63600175 PHARM REV CODE 636 W HCPCS: Performed by: NURSE PRACTITIONER

## 2024-02-06 PROCEDURE — 63600175 PHARM REV CODE 636 W HCPCS: Performed by: NURSE ANESTHETIST, CERTIFIED REGISTERED

## 2024-02-06 PROCEDURE — 25000003 PHARM REV CODE 250: Performed by: UROLOGY

## 2024-02-06 PROCEDURE — 71000015 HC POSTOP RECOV 1ST HR: Performed by: UROLOGY

## 2024-02-06 PROCEDURE — 63600175 PHARM REV CODE 636 W HCPCS: Performed by: ANESTHESIOLOGY

## 2024-02-06 PROCEDURE — 37000008 HC ANESTHESIA 1ST 15 MINUTES: Performed by: UROLOGY

## 2024-02-06 PROCEDURE — 25000003 PHARM REV CODE 250: Performed by: SPECIALIST

## 2024-02-06 PROCEDURE — 25000003 PHARM REV CODE 250: Performed by: NURSE ANESTHETIST, CERTIFIED REGISTERED

## 2024-02-06 PROCEDURE — 71000033 HC RECOVERY, INTIAL HOUR: Performed by: UROLOGY

## 2024-02-06 PROCEDURE — 54161 CIRCUM 28 DAYS OR OLDER: CPT | Mod: ,,, | Performed by: UROLOGY

## 2024-02-06 PROCEDURE — 71000016 HC POSTOP RECOV ADDL HR: Performed by: UROLOGY

## 2024-02-06 PROCEDURE — 27201423 OPTIME MED/SURG SUP & DEVICES STERILE SUPPLY: Performed by: UROLOGY

## 2024-02-06 PROCEDURE — 36000706: Performed by: UROLOGY

## 2024-02-06 RX ORDER — OXYCODONE AND ACETAMINOPHEN 5; 325 MG/1; MG/1
2 TABLET ORAL ONCE
Status: COMPLETED | OUTPATIENT
Start: 2024-02-06 | End: 2024-02-06

## 2024-02-06 RX ORDER — HYDROMORPHONE HYDROCHLORIDE 1 MG/ML
0.5 INJECTION, SOLUTION INTRAMUSCULAR; INTRAVENOUS; SUBCUTANEOUS EVERY 5 MIN PRN
Status: ACTIVE | OUTPATIENT
Start: 2024-02-06

## 2024-02-06 RX ORDER — ONDANSETRON HYDROCHLORIDE 2 MG/ML
INJECTION, SOLUTION INTRAVENOUS
Status: DISCONTINUED | OUTPATIENT
Start: 2024-02-06 | End: 2024-02-06

## 2024-02-06 RX ORDER — HYDROMORPHONE HYDROCHLORIDE 1 MG/ML
0.2 INJECTION, SOLUTION INTRAMUSCULAR; INTRAVENOUS; SUBCUTANEOUS EVERY 5 MIN PRN
Status: ACTIVE | OUTPATIENT
Start: 2024-02-06

## 2024-02-06 RX ORDER — ROCURONIUM BROMIDE 10 MG/ML
INJECTION, SOLUTION INTRAVENOUS
Status: DISCONTINUED | OUTPATIENT
Start: 2024-02-06 | End: 2024-02-06

## 2024-02-06 RX ORDER — INSULIN ASPART 100 [IU]/ML
0-5 INJECTION, SOLUTION INTRAVENOUS; SUBCUTANEOUS
Status: DISPENSED | OUTPATIENT
Start: 2024-02-06

## 2024-02-06 RX ORDER — LIDOCAINE HYDROCHLORIDE 20 MG/ML
INJECTION INTRAVENOUS
Status: DISCONTINUED | OUTPATIENT
Start: 2024-02-06 | End: 2024-02-06

## 2024-02-06 RX ORDER — PROCHLORPERAZINE EDISYLATE 5 MG/ML
5 INJECTION INTRAMUSCULAR; INTRAVENOUS ONCE AS NEEDED
Status: ACTIVE | OUTPATIENT
Start: 2024-02-06 | End: 2035-07-05

## 2024-02-06 RX ORDER — FENTANYL CITRATE 50 UG/ML
INJECTION, SOLUTION INTRAMUSCULAR; INTRAVENOUS
Status: DISCONTINUED | OUTPATIENT
Start: 2024-02-06 | End: 2024-02-06

## 2024-02-06 RX ORDER — GLUCAGON 1 MG
1 KIT INJECTION
Status: ACTIVE | OUTPATIENT
Start: 2024-02-06

## 2024-02-06 RX ORDER — PROPOFOL 10 MG/ML
VIAL (ML) INTRAVENOUS
Status: DISCONTINUED | OUTPATIENT
Start: 2024-02-06 | End: 2024-02-06

## 2024-02-06 RX ORDER — HYDROCODONE BITARTRATE AND ACETAMINOPHEN 5; 325 MG/1; MG/1
1 TABLET ORAL EVERY 6 HOURS PRN
Qty: 20 TABLET | Refills: 0 | Status: SHIPPED | OUTPATIENT
Start: 2024-02-06 | End: 2024-02-22 | Stop reason: ALTCHOICE

## 2024-02-06 RX ORDER — SODIUM CHLORIDE, SODIUM LACTATE, POTASSIUM CHLORIDE, CALCIUM CHLORIDE 600; 310; 30; 20 MG/100ML; MG/100ML; MG/100ML; MG/100ML
INJECTION, SOLUTION INTRAVENOUS CONTINUOUS
Status: ACTIVE | OUTPATIENT
Start: 2024-02-06

## 2024-02-06 RX ORDER — BACITRACIN 500 [USP'U]/G
OINTMENT TOPICAL
Status: DISCONTINUED | OUTPATIENT
Start: 2024-02-06 | End: 2024-02-06 | Stop reason: HOSPADM

## 2024-02-06 RX ORDER — ONDANSETRON HYDROCHLORIDE 2 MG/ML
4 INJECTION, SOLUTION INTRAVENOUS ONCE
Status: ACTIVE | OUTPATIENT
Start: 2024-02-06

## 2024-02-06 RX ORDER — IPRATROPIUM BROMIDE AND ALBUTEROL SULFATE 2.5; .5 MG/3ML; MG/3ML
3 SOLUTION RESPIRATORY (INHALATION) ONCE AS NEEDED
Status: ACTIVE | OUTPATIENT
Start: 2024-02-06 | End: 2035-07-05

## 2024-02-06 RX ORDER — MIDAZOLAM HYDROCHLORIDE 1 MG/ML
2 INJECTION INTRAMUSCULAR; INTRAVENOUS ONCE AS NEEDED
Status: COMPLETED | OUTPATIENT
Start: 2024-02-06 | End: 2024-02-06

## 2024-02-06 RX ORDER — MEPERIDINE HYDROCHLORIDE 25 MG/ML
12.5 INJECTION INTRAMUSCULAR; INTRAVENOUS; SUBCUTANEOUS ONCE
Status: ACTIVE | OUTPATIENT
Start: 2024-02-06 | End: 2024-02-07

## 2024-02-06 RX ORDER — LIDOCAINE HYDROCHLORIDE 10 MG/ML
INJECTION INFILTRATION; PERINEURAL
Status: DISCONTINUED | OUTPATIENT
Start: 2024-02-06 | End: 2024-02-06 | Stop reason: HOSPADM

## 2024-02-06 RX ORDER — SODIUM CHLORIDE, SODIUM LACTATE, POTASSIUM CHLORIDE, CALCIUM CHLORIDE 600; 310; 30; 20 MG/100ML; MG/100ML; MG/100ML; MG/100ML
INJECTION, SOLUTION INTRAVENOUS CONTINUOUS
Status: DISCONTINUED | OUTPATIENT
Start: 2024-02-06 | End: 2024-02-06

## 2024-02-06 RX ORDER — DIPHENHYDRAMINE HYDROCHLORIDE 50 MG/ML
25 INJECTION INTRAMUSCULAR; INTRAVENOUS ONCE AS NEEDED
Status: ACTIVE | OUTPATIENT
Start: 2024-02-06 | End: 2035-07-05

## 2024-02-06 RX ORDER — CEFAZOLIN SODIUM 1 G/3ML
INJECTION, POWDER, FOR SOLUTION INTRAMUSCULAR; INTRAVENOUS
Status: DISCONTINUED | OUTPATIENT
Start: 2024-02-06 | End: 2024-02-06

## 2024-02-06 RX ORDER — MIDAZOLAM HYDROCHLORIDE 1 MG/ML
2 INJECTION INTRAMUSCULAR; INTRAVENOUS ONCE AS NEEDED
Status: DISCONTINUED | OUTPATIENT
Start: 2024-02-06 | End: 2024-02-06

## 2024-02-06 RX ADMIN — SODIUM CHLORIDE, POTASSIUM CHLORIDE, SODIUM LACTATE AND CALCIUM CHLORIDE: 600; 310; 30; 20 INJECTION, SOLUTION INTRAVENOUS at 09:02

## 2024-02-06 RX ADMIN — FENTANYL CITRATE 50 MCG: 50 INJECTION INTRAMUSCULAR; INTRAVENOUS at 10:02

## 2024-02-06 RX ADMIN — FENTANYL CITRATE 50 MCG: 50 INJECTION INTRAMUSCULAR; INTRAVENOUS at 11:02

## 2024-02-06 RX ADMIN — ROCURONIUM BROMIDE 50 MG: 10 INJECTION INTRAVENOUS at 10:02

## 2024-02-06 RX ADMIN — CEFAZOLIN 2 G: 330 INJECTION, POWDER, FOR SOLUTION INTRAMUSCULAR; INTRAVENOUS at 10:02

## 2024-02-06 RX ADMIN — INSULIN ASPART 2 UNITS: 100 INJECTION, SOLUTION INTRAVENOUS; SUBCUTANEOUS at 12:02

## 2024-02-06 RX ADMIN — OXYCODONE HYDROCHLORIDE AND ACETAMINOPHEN 1 TABLET: 5; 325 TABLET ORAL at 01:02

## 2024-02-06 RX ADMIN — ONDANSETRON 4 MG: 2 INJECTION INTRAMUSCULAR; INTRAVENOUS at 11:02

## 2024-02-06 RX ADMIN — MIDAZOLAM HYDROCHLORIDE 2 MG: 1 INJECTION, SOLUTION INTRAMUSCULAR; INTRAVENOUS at 10:02

## 2024-02-06 RX ADMIN — SUGAMMADEX 200 MG: 100 INJECTION, SOLUTION INTRAVENOUS at 11:02

## 2024-02-06 RX ADMIN — INSULIN ASPART 2 UNITS: 100 INJECTION, SOLUTION INTRAVENOUS; SUBCUTANEOUS at 09:02

## 2024-02-06 RX ADMIN — LIDOCAINE HYDROCHLORIDE 75 MG: 20 INJECTION INTRAVENOUS at 10:02

## 2024-02-06 RX ADMIN — PROPOFOL 170 MG: 10 INJECTION, EMULSION INTRAVENOUS at 10:02

## 2024-02-06 NOTE — DISCHARGE SUMMARY
[facility] - Periop Services  Discharge Note  Short Stay    Procedure(s) (LRB):  CIRCUMCISION (N/A)      OUTCOME: Patient tolerated treatment/procedure well without complication and is now ready for discharge.    DISPOSITION: Home or Self Care    FINAL DIAGNOSIS:  Phimosis, balanitis    FOLLOWUP: In clinic on 21 February 2024 at 2:30 pm    DISCHARGE INSTRUCTIONS:  No discharge procedures on file.      Clinical Reference Documents Added to Patient Instructions         Document    CIRCUMCISION DISCHARGE INSTRUCTIONS, ADULT (ENGLISH)            TIME SPENT ON DISCHARGE: 10 minutes

## 2024-02-06 NOTE — ANESTHESIA PROCEDURE NOTES
Intubation    Date/Time: 2/6/2024 10:37 AM    Performed by: May Bautista CRNA  Authorized by: May Bautista CRNA    Intubation:     Induction:  Intravenous    Intubated:  Postinduction    Mask Ventilation:  Easy with oral airway    Attempts:  1    Attempted By:  CRNA    Method of Intubation:  Direct    Blade:  Delacruz 2    Laryngeal View Grade: Grade I - full view of cords      Difficult Airway Encountered?: No      Complications:  None    Airway Device:  Oral endotracheal tube    Airway Device Size:  7.5    Style/Cuff Inflation:  Cuffed (inflated to minimal occlusive pressure)    Inflation Amount (mL):  8    Tube secured:  22    Secured at:  The lips    Placement Verified By:  Capnometry    Complicating Factors:  None    Findings Post-Intubation:  BS equal bilateral and atraumatic/condition of teeth unchanged

## 2024-02-06 NOTE — TRANSFER OF CARE
Anesthesia Transfer of Care Note    Patient: Jesus Martinez    Procedure(s) Performed: Procedure(s) (LRB):  CIRCUMCISION (N/A)    Patient location: PACU    Anesthesia Type: general    Transport from OR: Transported from OR on room air with adequate spontaneous ventilation    Post pain: adequate analgesia    Post assessment: no apparent anesthetic complications and tolerated procedure well    Post vital signs: stable    Level of consciousness: sedated    Nausea/Vomiting: no nausea/vomiting    Complications: none    Transfer of care protocol was followed

## 2024-02-06 NOTE — OP NOTE
UROLOGY OPERATIVE NOTE        Date of Procedure:   6 February 2023    Pre-op Diagnosis: Phimosis    Post-op Diagnosis: Phimosis    Procedure: Circumcision    Surgeon: Vero Lugo D.O.    Assistant: None    Anesthesia: General    Complications: None    Blood Loss: <5 ml    Implants: None    Disposition: Patient was taken to the PACU    Condition: Stable    Procedure Details:   The patient was taken to the OR and placed in the supine position.  He was prepped and draped in the usual sterile fashion.  A circumcision was performed excising the foreskin using a sleeve technique.  Hemostatis was achieved with electrocautery and Surgicel powder.  The skin edges were approximated with 3-0 chronic in an interrupted fashion.  The frenulum was approximated in a running fashion with 4-0 chronic.  The incision was dressed with Surgicel powder, Bacitracin, Vaseline gauze, Kerlix and Coban.

## 2024-02-06 NOTE — H&P
CC:  Phimosis and balanitis    HPI:  Jesus Martinez is a 59 y.o. male here for circumcision.  He has been having difficulty retracting the foreskin.  He has also had problems with recurrent balanitis.    ROS:  All systems reviewed and are negative except as documented in HPI and/or Assessment and Plan.     Patient Active Problem List:     Patient Active Problem List   Diagnosis    Mild CAD    Erectile dysfunction    Primary hypertension    Microscopic hematuria    Mixed hyperlipidemia    Morbid obesity    Plantar fasciitis    Vitamin D deficiency    Type 2 diabetes mellitus without complication, without long-term current use of insulin    SANCHEZ (dyspnea on exertion)    Bilateral lower extremity pain    Peripheral edema    Deficient foreskin        Past Medical History:  Past Medical History:   Diagnosis Date    Hypertension     Mixed hyperlipidemia         Past Surgical History:  Past Surgical History:   Procedure Laterality Date    CARDIAC CATHETERIZATION      2018        Family History  Family History   Problem Relation Age of Onset    Heart disease Mother     Heart disease Father         Social History:  Social History     Socioeconomic History    Marital status:    Tobacco Use    Smoking status: Former     Current packs/day: 0.00     Average packs/day: 0.3 packs/day for 7.0 years (1.8 ttl pk-yrs)     Types: Cigarettes     Start date:      Quit date:      Years since quittin.1    Smokeless tobacco: Never   Substance and Sexual Activity    Alcohol use: Not Currently    Drug use: Never    Sexual activity: Yes     Social Determinants of Health     Financial Resource Strain: Low Risk  (2023)    Overall Financial Resource Strain (CARDIA)     Difficulty of Paying Living Expenses: Not hard at all   Food Insecurity: No Food Insecurity (2023)    Hunger Vital Sign     Worried About Running Out of Food in the Last Year: Never true     Ran Out of Food in the Last Year: Never true    Transportation Needs: No Transportation Needs (9/21/2023)    PRAPARE - Transportation     Lack of Transportation (Medical): No     Lack of Transportation (Non-Medical): No   Physical Activity: Inactive (9/21/2023)    Exercise Vital Sign     Days of Exercise per Week: 0 days     Minutes of Exercise per Session: 0 min   Stress: No Stress Concern Present (9/21/2023)    [employer] - Occupational Stress Questionnaire     Feeling of Stress : Not at all   Social Connections: Moderately Isolated (9/21/2023)    Social Connection and Isolation Panel [NHANES]     Frequency of Communication with Friends and Family: Once a week     Frequency of Social Gatherings with Friends and Family: Once a week     Attends Church Services: More than 4 times per year     Active Member of Clubs or Organizations: No     Attends Club or Organization Meetings: Never     Marital Status:    Housing Stability: Unknown (9/21/2023)    Housing Stability Vital Sign     Unable to Pay for Housing in the Last Year: No     Number of Places Lived in the Last Year: 1        Medications:  Current Outpatient Medications   Medication Instructions    clotrimazole (LOTRIMIN) 1 % cream Topical (Top), 2 times daily    ezetimibe (ZETIA) 10 mg, Oral, Nightly, For High Cholesterol    lisinopriL (PRINIVIL,ZESTRIL) 20 mg, Oral, Nightly, For High Blood Pressure    lisinopriL-hydrochlorothiazide (PRINZIDE,ZESTORETIC) 20-12.5 mg per tablet 1 tablet, Oral, Daily, For High Blood Pressure    menthol 3.5 % Gel Topical    rosuvastatin (CRESTOR) 40 mg, Oral, Nightly, For High Cholesterol    tadalafiL (CIALIS) 20 mg, Oral, Daily PRN        Allergies:  Review of patient's allergies indicates:  No Known Allergies     Objective:  There were no vitals filed for this visit.  General:  Well developed, well nourished adult male in no acute distress  Abdomen: Soft, nontender, no masses  Extremities:  No clubbing, cyanosis, or edema  Neurologic:   Grossly intact  Musculoskeletal:  Normal tone  Penis:  Uncircumcised with phimosis present   Testicles:  Nontender, no masses  Epididymis:  Normal without masses    Assessment:  Phimosis and balanitis    Plan:  Circumcision

## 2024-02-06 NOTE — LETTER
"Jesus Martinez (MICHAEL) was seen and treated in our surgery department on 02/06/2024.  Please excuse Jesus until he is released by his surgeon.  Next surgeon visit is 02/21/2021.  If you have any questions or concerns, please don't hesitate to call.      Bill RN, Outpatient Surgery, [phone]      "

## 2024-02-06 NOTE — DISCHARGE INSTRUCTIONS
· Keep follow up appointment on _____________ in [facility].  Resume home medications unless otherwise instructed by your doctor.    · No heavy lifting or straining or strenuous exercise for 2-3 weeks.    · You may remove dressing in 24 hours.  If it falls off before then, you may redress it or leave dressing off.    ` Apply bacitracin ointment, Vaseline or aquaphor ointment at least 4 times per day to keep incision from drying.    ` You may take a shower starting Thursday evening. Wash your body and let soapy water run over incision area (do not scrub incision), rinse with water, and pat dry.    · Do not soak your wound in water for two weeks. Do not take baths, swim, or use a hot tub until your doctor says it is okay.    · Use pain medication as instructed. Do not take Tylenol (acetaminophen) with your NORCO, since NORCO contains Tylenol as well.    · You may use an ice pack as needed for 20 minutes at a time over your incision site to minimize swelling and help relieve pain.    · Do not drink alcohol or drive today, or as long as you are on pain medication.    · Notify MD of any moderate to severe pain unrelieved by pain medicine, if your incision opens and/or bleeds, or for any signs of infection including fever above 100.4, excessive redness or swelling, yellow/green foul- smelling drainage, nausea or vomiting. [facility] number is [phone]. If it is after business hours or emergency, you may need to be seen in an urgent care or emergency department                                                                    Feb. 06, 2024            Patient: Jesus Martinez  YOB: 1965  Date of Visit: 02/06/2024    To Whom It May Concern:    __Jesus Martinez_ was at [facility] on  02/06/2024. The patient may return to work after released by his surgeon.   Scheduled to see surgeon next on  02/21/2024.   If you have any questions or concerns, or if I can be of further  assistance, please do not hesitate to contact me.    Sincerely,    Prisca Enamorado RN  [facility] Outpatient Surgery [phone]      Thanks for choosing [facility]! Have a smooth recovery!

## 2024-02-06 NOTE — ANESTHESIA POSTPROCEDURE EVALUATION
Anesthesia Post Evaluation    Patient: Jesus Martinez    Procedure(s) Performed: Procedure(s) (LRB):  CIRCUMCISION (N/A)    Final Anesthesia Type: general      Patient location during evaluation: PACU  Post-procedure vital signs: reviewed and stable  Airway patency: patent      Anesthetic complications: no      Cardiovascular status: hemodynamically stable  Respiratory status: spontaneous ventilation  Follow-up not needed.              Vitals Value Taken Time   /89 02/06/24 1219   Temp 36.3 °C (97.3 °F) 02/06/24 1215   Pulse 83 02/06/24 1219   Resp 13 02/06/24 1219   SpO2 100 % 02/06/24 1219         No case tracking events are documented in the log.      Pain/Hayden Score: No data recorded

## 2024-02-07 VITALS
WEIGHT: 232.19 LBS | OXYGEN SATURATION: 95 % | DIASTOLIC BLOOD PRESSURE: 68 MMHG | RESPIRATION RATE: 20 BRPM | TEMPERATURE: 98 F | HEART RATE: 61 BPM | BODY MASS INDEX: 36.37 KG/M2 | SYSTOLIC BLOOD PRESSURE: 120 MMHG

## 2024-02-07 LAB
ESTROGEN SERPL-MCNC: NORMAL PG/ML
INSULIN SERPL-ACNC: NORMAL U[IU]/ML
LAB AP CLINICAL INFORMATION: NORMAL
LAB AP GROSS DESCRIPTION: NORMAL
LAB AP REPORT FOOTNOTES: NORMAL
POCT GLUCOSE: 247 MG/DL (ref 70–110)
T3RU NFR SERPL: NORMAL %

## 2024-02-09 ENCOUNTER — HOSPITAL ENCOUNTER (EMERGENCY)
Facility: HOSPITAL | Age: 59
Discharge: HOME | End: 2024-02-09
Attending: SPECIALIST
Payer: COMMERCIAL

## 2024-02-09 ENCOUNTER — TELEPHONE (OUTPATIENT)
Dept: INTERNAL MEDICINE | Facility: CLINIC | Age: 59
End: 2024-02-09
Payer: COMMERCIAL

## 2024-02-09 VITALS
HEART RATE: 92 BPM | TEMPERATURE: 98 F | BODY MASS INDEX: 36.41 KG/M2 | DIASTOLIC BLOOD PRESSURE: 84 MMHG | SYSTOLIC BLOOD PRESSURE: 152 MMHG | HEIGHT: 67 IN | RESPIRATION RATE: 20 BRPM | OXYGEN SATURATION: 97 % | WEIGHT: 232 LBS

## 2024-02-09 DIAGNOSIS — R73.9 HYPERGLYCEMIA: Primary | ICD-10-CM

## 2024-02-09 DIAGNOSIS — R03.0 ELEVATED BLOOD PRESSURE READING: ICD-10-CM

## 2024-02-09 LAB — POCT GLUCOSE: 272 MG/DL (ref 70–110)

## 2024-02-09 PROCEDURE — 82962 GLUCOSE BLOOD TEST: CPT

## 2024-02-09 PROCEDURE — 99282 EMERGENCY DEPT VISIT SF MDM: CPT

## 2024-02-09 NOTE — TELEPHONE ENCOUNTER
Pt called and said he has circumcision on 2/6/24. Pt says when he went for procedure, his CBG was high (249) and they had to give him something to bring it down before they could do procedure. He said ever since then he has been feeling bad/off and his sugar is running really high. I had him recheck it and it was 495. I advised him to go to the ER due to having symptoms and CBG being so high. I also scheduled him for next available appt with you on 2/22/24 at 10:40am and added him to the wait list. I just wanted to let you know.

## 2024-02-09 NOTE — TELEPHONE ENCOUNTER
----- Message from Anirudh Limon sent at 2/9/2024  3:04 PM CST -----  Type:  Needs Medical Advice    Who Called: pt     Best Call Back Number:  [phone]  Additional Information: pt called in reference to elevated blood sugar levels , asked for a call back to discuss next step

## 2024-02-10 NOTE — DISCHARGE INSTRUCTIONS
Keep track of your blood sugars and keep her appointment on February 21st; regular exercise; smaller portions

## 2024-02-10 NOTE — ED PROVIDER NOTES
Encounter Date: 2024       History     Chief Complaint   Patient presents with    Hyperglycemia     Reports he has a circumcision last week and his blood sugar was high. Report today and yesterday it has been running in the 200's. Denies excessive thirst or urinary frequency. Denies history of diabetes. 2023, blood sugar was 237 on last lab results     Patient with elevated blood sugar running in the 200s, no symptoms: Had a recent circumcision    The history is provided by the patient.     Review of patient's allergies indicates:  No Known Allergies  Past Medical History:   Diagnosis Date    Hypertension     Mixed hyperlipidemia      Past Surgical History:   Procedure Laterality Date    CARDIAC CATHETERIZATION      2018    CIRCUMCISION N/A 2024    Procedure: CIRCUMCISION;  Surgeon: Vero Lugo DO;  Location: [facility] [place];  Service: Urology;  Laterality: N/A;    CIRCUMCISION       Family History   Problem Relation Age of Onset    Heart disease Mother     Heart disease Father      Social History     Tobacco Use    Smoking status: Former     Current packs/day: 0.00     Average packs/day: 0.3 packs/day for 7.0 years (1.8 ttl pk-yrs)     Types: Cigarettes     Start date:      Quit date:      Years since quittin.1    Smokeless tobacco: Never   Substance Use Topics    Alcohol use: Not Currently    Drug use: Never     Review of Systems   Constitutional: Negative.    HENT: Negative.     Respiratory: Negative.     Cardiovascular: Negative.    Gastrointestinal: Negative.    Genitourinary: Negative.    Musculoskeletal: Negative.    Neurological: Negative.        Physical Exam     Initial Vitals [24 1545]   BP Pulse Resp Temp SpO2   (!) 152/84 92 20 97.9 °F (36.6 °C) 97 %      MAP       --         Physical Exam    Nursing note and vitals reviewed.  Constitutional: He appears well-developed and well-nourished.   HENT:   Head: Normocephalic and atraumatic.   Eyes: EOM are normal. Pupils are equal,  "round, and reactive to light.   Neck: Neck supple.   Normal range of motion.  Cardiovascular:  Normal rate, regular rhythm and normal heart sounds.           Pulmonary/Chest: Breath sounds normal.   Abdominal: Abdomen is soft. There is no abdominal tenderness.   Musculoskeletal:         General: Normal range of motion.      Cervical back: Normal range of motion and neck supple.     Neurological: He is alert and oriented to person, place, and time. GCS score is 15. GCS eye subscore is 4. GCS verbal subscore is 5. GCS motor subscore is 6.   Skin: Skin is warm and dry.         ED Course   Procedures  Labs Reviewed   POCT GLUCOSE - Abnormal; Notable for the following components:       Result Value    POCT Glucose 272 (*)     All other components within normal limits          Imaging Results    None          Medications - No data to display  Medical Decision Making  Patient with elevated blood sugar running in the 200s, no symptoms: Had a recent circumcision    DIFFERENTIAL DIAGNOSIS- type 2 diabetes mellitus    Risk  Risk Details: Discussed with patient and he declined blood work as this was done recently; states he has an appointment in 1-1/2 weeks with his primary care physician; I discussed keeping blood sugar logs as his wife has a glucometer and discussed regular exercise; patient agrees                Patient Vitals for the past 24 hrs:   BP Temp Temp src Pulse Resp SpO2 Height Weight   02/09/24 1545 (!) 152/84 97.9 °F (36.6 °C) Oral 92 20 97 % 5' 7" (1.702 m) 105.2 kg (232 lb)                     Keep track of your blood sugars and keep her appointment on February 21st; regular exercise; smaller portions      Clinical Impression:  Final diagnoses:  [R73.9] Hyperglycemia (Primary)  [R03.0] Elevated blood pressure reading          ED Disposition Condition    Discharge Stable          ED Prescriptions    None       Follow-up Information       Follow up With Specialties Details Why Contact Annamarie George FNP " Nurse Practitioner  Keep appointment February 21st [address] [facility] [address]  [phone]               Jimmie Mittal MD  02/10/24 0032

## 2024-02-14 ENCOUNTER — TELEPHONE (OUTPATIENT)
Dept: INTERNAL MEDICINE | Facility: CLINIC | Age: 59
End: 2024-02-14
Payer: COMMERCIAL

## 2024-02-14 NOTE — TELEPHONE ENCOUNTER
----- Message from Anirudh Limon sent at 2/14/2024  8:35 AM CST -----  Type:  Needs Medical Advice    Who Called: pt    Would the patient rather a call back or a response via [facility]?   Best Call Back Number: [phone]    Additional Information: Pt called to update Anabel about elevated blood sugar, was told by ER blood sugar wasn't high enough to do anything , pt is scheduled for an ofc visit on 02/22 would like a call back to determine if labs  need to be completed before appt.          I contacted patient and advised no lab for NOV . However he will have lab for appointment scheduled in March . Patient voiced understanding.

## 2024-02-21 ENCOUNTER — OFFICE VISIT (OUTPATIENT)
Dept: UROLOGY | Facility: CLINIC | Age: 59
End: 2024-02-21
Payer: COMMERCIAL

## 2024-02-21 VITALS
BODY MASS INDEX: 36.47 KG/M2 | WEIGHT: 232.38 LBS | HEART RATE: 80 BPM | SYSTOLIC BLOOD PRESSURE: 138 MMHG | OXYGEN SATURATION: 98 % | TEMPERATURE: 99 F | DIASTOLIC BLOOD PRESSURE: 89 MMHG | RESPIRATION RATE: 20 BRPM | HEIGHT: 67 IN

## 2024-02-21 DIAGNOSIS — N47.1 PHIMOSIS: Primary | ICD-10-CM

## 2024-02-21 DIAGNOSIS — R97.20 RISING PSA LEVEL: ICD-10-CM

## 2024-02-21 LAB
BILIRUB SERPL-MCNC: NEGATIVE MG/DL
BLOOD URINE, POC: NEGATIVE
COLOR, POC UA: YELLOW
GLUCOSE UR QL STRIP: 500
KETONES UR QL STRIP: NEGATIVE
LEUKOCYTE ESTERASE URINE, POC: NEGATIVE
NITRITE, POC UA: NEGATIVE
PH, POC UA: 6.5
PROTEIN, POC: NEGATIVE
SPECIFIC GRAVITY, POC UA: 1.01
UROBILINOGEN, POC UA: 0.2

## 2024-02-21 PROCEDURE — 99215 OFFICE O/P EST HI 40 MIN: CPT | Mod: PBBFAC | Performed by: UROLOGY

## 2024-02-21 PROCEDURE — 1159F MED LIST DOCD IN RCRD: CPT | Mod: CPTII,,, | Performed by: UROLOGY

## 2024-02-21 PROCEDURE — 3075F SYST BP GE 130 - 139MM HG: CPT | Mod: CPTII,,, | Performed by: UROLOGY

## 2024-02-21 PROCEDURE — 1160F RVW MEDS BY RX/DR IN RCRD: CPT | Mod: CPTII,,, | Performed by: UROLOGY

## 2024-02-21 PROCEDURE — 3079F DIAST BP 80-89 MM HG: CPT | Mod: CPTII,,, | Performed by: UROLOGY

## 2024-02-21 PROCEDURE — 99024 POSTOP FOLLOW-UP VISIT: CPT | Mod: ,,, | Performed by: UROLOGY

## 2024-02-21 PROCEDURE — 4010F ACE/ARB THERAPY RXD/TAKEN: CPT | Mod: CPTII,,, | Performed by: UROLOGY

## 2024-02-21 PROCEDURE — 81001 URINALYSIS AUTO W/SCOPE: CPT | Mod: PBBFAC | Performed by: UROLOGY

## 2024-02-21 NOTE — PROGRESS NOTES
CC:  Postop    HPI:  Jesus Martinez is a 59 y.o. male seen for follow-up after a circumcision.  He had a circumcision on 6 February 2024.  He has done well.  He has no complaints.  A PSA which makenzie a repeat in December 2023 was back down to normal.    Urinalysis:  Results for orders placed or performed in visit on 02/21/24   POCT URINE DIPSTICK WITH MICROSCOPE, AUTOMATED   Result Value Ref Range    Color, UA Yellow     Spec Grav UA 1.015     pH, UA 6.5     WBC, UA Negative     Nitrite, UA Negative     Protein, POC Negative     Glucose,      Ketones, UA Negative     Urobilinogen, UA 0.2     Bilirubin, POC Negative     Blood, UA Negative      Microscopic Urinalysis:  WBC:   None per HPF     RBC:    None per HPF     Bacteria:    None per HPF     Squamous epithelial cells:    None per HPF     Crystals:   None    Lab Results:  PSA History:    10/24/17 06:34 11/20/18 06:20 11/16/20 06:18 09/17/21 06:33 09/19/22 06:27 12/11/23 10:27   0.9 0.8 0.57 0.58 1.91 0.68     Pathology:  Benign skin with chronic inflammation, congestion, and fibrosis.    ROS:  All systems reviewed and are negative except as documented in HPI and/or Assessment and Plan.     Patient Active Problem List:     Patient Active Problem List   Diagnosis    Mild CAD    Erectile dysfunction    Primary hypertension    Microscopic hematuria    Mixed hyperlipidemia    Morbid obesity    Plantar fasciitis    Vitamin D deficiency    Type 2 diabetes mellitus without complication, without long-term current use of insulin    SANCHEZ (dyspnea on exertion)    Bilateral lower extremity pain    Peripheral edema    Deficient foreskin    Balanitis    Phimosis        Past Medical History:  Past Medical History:   Diagnosis Date    Hypertension     Mixed hyperlipidemia         Past Surgical History:  Past Surgical History:   Procedure Laterality Date    CARDIAC CATHETERIZATION      2018    CIRCUMCISION N/A 02/06/2024    Procedure: CIRCUMCISION;  Surgeon: Vero Lugo  DO;  Location: [facility];  Service: Urology;  Laterality: N/A;    CIRCUMCISION          Family History:  Family History   Problem Relation Age of Onset    Heart disease Mother     Heart disease Father         Social History:  Social History     Socioeconomic History    Marital status:    Tobacco Use    Smoking status: Former     Current packs/day: 0.00     Average packs/day: 0.3 packs/day for 7.0 years (1.8 ttl pk-yrs)     Types: Cigarettes     Start date:      Quit date:      Years since quittin.1    Smokeless tobacco: Never   Substance and Sexual Activity    Alcohol use: Not Currently    Drug use: Never    Sexual activity: Yes     Social Determinants of Health     Financial Resource Strain: Medium Risk (2024)    Overall Financial Resource Strain (CARDIA)     Difficulty of Paying Living Expenses: Somewhat hard   Food Insecurity: Food Insecurity Present (2024)    Hunger Vital Sign     Worried About Running Out of Food in the Last Year: Sometimes true     Ran Out of Food in the Last Year: Sometimes true   Transportation Needs: No Transportation Needs (2024)    PRAPARE - Transportation     Lack of Transportation (Medical): No     Lack of Transportation (Non-Medical): No   Physical Activity: Insufficiently Active (2024)    Exercise Vital Sign     Days of Exercise per Week: 3 days     Minutes of Exercise per Session: 10 min   Stress: No Stress Concern Present (2024)    [employer] [place] [employer] - Occupational Stress Questionnaire     Feeling of Stress : Not at all   Social Connections: Moderately Integrated (2024)    Social Connection and Isolation Panel [NHANES]     Frequency of Communication with Friends and Family: Once a week     Frequency of Social Gatherings with Friends and Family: Once a week     Attends Caodaism Services: More than 4 times per year     Active Member of Clubs or Organizations: Yes     Attends Club or Organization Meetings: More than  4 times per year     Marital Status:    Housing Stability: Low Risk  (2/20/2024)    Housing Stability Vital Sign     Unable to Pay for Housing in the Last Year: No     Number of Places Lived in the Last Year: 0     Unstable Housing in the Last Year: No        Allergies:  Review of patient's allergies indicates:  No Known Allergies     Objective:  Vitals:    02/21/24 1413   BP: 138/89   Pulse: 80   Resp: 20   Temp: 98.5 °F (36.9 °C)     General:  Well developed, well nourished adult male in no acute distress  Abdomen: Soft, nontender, no masses  Extremities:  No clubbing, cyanosis, or edema  Neurologic:  Grossly intact  Musculoskeletal:  Normal tone  Penis:  The incision is healing well.  There are a few sutures remaining.  No signs of infection.    Last BLANQUITA:  December 2023    Assessment:  1. Phimosis  - POCT URINE DIPSTICK WITH MICROSCOPE, AUTOMATED    2. Rising PSA level     Plan:  Doing well after a circumcision.  We will allow him to return to work on one March 2024.  Repeat PSA in six months.    Follow-up:  PSA in six months and follow-up after.

## 2024-02-21 NOTE — PROGRESS NOTES
Pt seen by Dr. Lugo; Work excuse provided; Pt instructed to return to clinic in 6 months w/PSA; Discharge paperwork given w/pt verbalizing understanding

## 2024-02-22 ENCOUNTER — OFFICE VISIT (OUTPATIENT)
Dept: INTERNAL MEDICINE | Facility: CLINIC | Age: 59
End: 2024-02-22
Payer: COMMERCIAL

## 2024-02-22 VITALS
HEIGHT: 67 IN | RESPIRATION RATE: 16 BRPM | SYSTOLIC BLOOD PRESSURE: 130 MMHG | HEART RATE: 85 BPM | WEIGHT: 233 LBS | BODY MASS INDEX: 36.57 KG/M2 | DIASTOLIC BLOOD PRESSURE: 77 MMHG | TEMPERATURE: 99 F

## 2024-02-22 DIAGNOSIS — E11.9 TYPE 2 DIABETES MELLITUS WITHOUT COMPLICATION, WITHOUT LONG-TERM CURRENT USE OF INSULIN: Primary | ICD-10-CM

## 2024-02-22 PROCEDURE — 3075F SYST BP GE 130 - 139MM HG: CPT | Mod: CPTII,,, | Performed by: NURSE PRACTITIONER

## 2024-02-22 PROCEDURE — 3008F BODY MASS INDEX DOCD: CPT | Mod: CPTII,,, | Performed by: NURSE PRACTITIONER

## 2024-02-22 PROCEDURE — 1159F MED LIST DOCD IN RCRD: CPT | Mod: CPTII,,, | Performed by: NURSE PRACTITIONER

## 2024-02-22 PROCEDURE — 1160F RVW MEDS BY RX/DR IN RCRD: CPT | Mod: CPTII,,, | Performed by: NURSE PRACTITIONER

## 2024-02-22 PROCEDURE — 4010F ACE/ARB THERAPY RXD/TAKEN: CPT | Mod: CPTII,,, | Performed by: NURSE PRACTITIONER

## 2024-02-22 PROCEDURE — 99214 OFFICE O/P EST MOD 30 MIN: CPT | Mod: S$PBB,,, | Performed by: NURSE PRACTITIONER

## 2024-02-22 PROCEDURE — 99214 OFFICE O/P EST MOD 30 MIN: CPT | Mod: PBBFAC | Performed by: NURSE PRACTITIONER

## 2024-02-22 PROCEDURE — 3078F DIAST BP <80 MM HG: CPT | Mod: CPTII,,, | Performed by: NURSE PRACTITIONER

## 2024-02-22 RX ORDER — INSULIN PUMP SYRINGE, 3 ML
EACH MISCELLANEOUS
Qty: 1 EACH | Refills: 0 | Status: SHIPPED | OUTPATIENT
Start: 2024-02-22 | End: 2024-03-28

## 2024-02-22 RX ORDER — LANCETS
EACH MISCELLANEOUS
Qty: 100 EACH | Refills: 6 | Status: SHIPPED | OUTPATIENT
Start: 2024-02-22 | End: 2024-03-28

## 2024-02-22 NOTE — PROGRESS NOTES
Annamarie Galicia, FANG   [facility]  [address] [facility] [address]      PATIENT NAME: Jesus Martinez  : 1965  DATE: 24  MRN: [id number]      Reason for Visit / Chief Complaint: No chief complaint on file.       History of Present Illness / Problem Focused Workflow     Jesus Martinez presents to the clinic with No chief complaint on file.     58 yo AAM here today for f/u  Medical problems includes mild CAD, HLD, HTN, T2DM, Vitamin D deficiency, right knee pain, and ED. Former smoker.  <10 pack/yr history of smoking. Followed by [facility] and [facility]. Was referred to PT by [facility] - does not want to use.     Prostate Cancer Screening - 22 PSA 1.91  Colon Cancer Screening -  23 Cologuard Negative   Osteoporosis Screening - 22 Vitamin D level 42.2  HCV Screenin/15/22 Negative  Wellness Visit: 23  Pt here for in between OV for review of glucose levels. Pt went to ED on 24 for hyperglycemia, glucose was in 200's, pt was not treated. Pt reports today that he uses his wife's glucometer at home, does note have glucose log with him today. Instructed patient to keep AM fasting glucose log at home, will f/u with pt as previously scheduled in March with labs. Denies any acute issues today.               Review of Systems     Review of Systems      Medications and Allergies     Medications  Medication List with Changes/Refills   Current Medications    CLOTRIMAZOLE (LOTRIMIN) 1 % CREAM    Apply topically 2 (two) times daily. for 10 days    EZETIMIBE (ZETIA) 10 MG TABLET    Take 1 tablet (10 mg total) by mouth every evening. For High Cholesterol    HYDROCODONE-ACETAMINOPHEN (NORCO) 5-325 MG PER TABLET    Take 1 tablet by mouth every 6 (six) hours as needed for Pain.    LISINOPRIL (PRINIVIL,ZESTRIL) 20 MG TABLET    Take 1 tablet (20 mg total) by mouth every evening. For High Blood Pressure     LISINOPRIL-HYDROCHLOROTHIAZIDE (PRINZIDE,ZESTORETIC) 20-12.5 MG PER TABLET    Take 1 tablet by mouth once daily. For High Blood Pressure    MENTHOL 3.5 % GEL    Apply topically.    ROSUVASTATIN (CRESTOR) 40 MG TAB    Take 1 tablet (40 mg total) by mouth every evening. For High Cholesterol    TADALAFIL (CIALIS) 20 MG TAB    Take 1 tablet (20 mg total) by mouth daily as needed (Erectile Dysfunction).         Allergies  Review of patient's allergies indicates:  No Known Allergies    Physical Examination   There were no vitals filed for this visit.  Physical Exam      Results     Lab Results   Component Value Date    WBC 6.35 09/18/2023    RBC 4.97 09/18/2023    HGB 13.8 (L) 09/18/2023    HCT 42.8 09/18/2023    MCV 86.1 09/18/2023    MCH 27.8 09/18/2023    MCHC 32.2 (L) 09/18/2023    RDW 12.7 09/18/2023     09/18/2023    MPV 8.9 09/18/2023     Sodium Level   Date Value Ref Range Status   09/18/2023 139 136 - 145 mmol/L Final     Potassium Level   Date Value Ref Range Status   09/18/2023 4.2 3.5 - 5.1 mmol/L Final     Carbon Dioxide   Date Value Ref Range Status   09/18/2023 28 23 - 31 mmol/L Final     Blood Urea Nitrogen   Date Value Ref Range Status   09/18/2023 15.1 8.4 - 25.7 mg/dL Final     Creatinine   Date Value Ref Range Status   09/18/2023 0.89 0.73 - 1.18 mg/dL Final     Calcium Level Total   Date Value Ref Range Status   09/18/2023 9.3 8.8 - 10.0 mg/dL Final     Albumin Level   Date Value Ref Range Status   09/18/2023 3.5 3.4 - 4.8 g/dL Final     Bilirubin Total   Date Value Ref Range Status   09/18/2023 0.5 <=1.5 mg/dL Final     Alkaline Phosphatase   Date Value Ref Range Status   09/18/2023 48 40 - 150 unit/L Final     Aspartate Aminotransferase   Date Value Ref Range Status   09/18/2023 17 5 - 34 unit/L Final     Alanine Aminotransferase   Date Value Ref Range Status   09/18/2023 18 0 - 55 unit/L Final     Estimated GFR-Non    Date Value Ref Range Status   03/10/2022 >105 >=90       Lab Results   Component Value Date    CHOL 167 09/18/2023     Lab Results   Component Value Date    HDL 41 09/18/2023     Lab Results   Component Value Date    TRIG 114 09/18/2023     Lab Results   Component Value Date    VLDL 23 09/18/2023     Lab Results   Component Value Date    .00 09/18/2023     Lab Results   Component Value Date    TSH 0.9541 09/19/2022     Lab Results   Component Value Date    PHUR 6.5 02/21/2024    SPECGRAV 1.015 02/21/2024    PROTEINUA Trace (A) 09/18/2023    GLUCUA Normal 03/10/2022    KETONESU Negative 02/21/2024    OCCULTUA Negative 03/10/2022    NITRITE Negative 02/21/2024    LEUKOCYTESUR Negative 09/18/2023     Lab Results   Component Value Date    HGBA1C 7.6 (H) 09/18/2023    HGBA1C 6.4 03/16/2023    HGBA1C 6.4 12/15/2022           Assessment       No diagnosis found.    Plan      Problem List Items Addressed This Visit    None      Future Appointments   Date Time Provider Department Center   2/22/2024 10:40 AM Annamarie Galicia FNP [facility] [place] [facility]   3/28/2024  7:20 AM Annamarie Galicia FNP [facility] [place]   8/21/2024  2:30 PM Vero Lugo DO [facility] [place] [facility]            Signature:     [facility]  [address] [facility] [address]    Date of encounter: 2/22/24

## 2024-02-22 NOTE — ASSESSMENT & PLAN NOTE
A1C at NOV   Diet Controlled  Continue to monitor AM fasting glucose  F/U in march as directed  Follow ADA diet.  Avoid soda, simple sweets, and limit rice/pasta/bread/starches and consume brown options when possible.   Maintain healthy weight with BMI goal <30.   Perform aerobic exercise for 150 minutes per week (or 5 days a week for 30 minutes each day).   Examine feet daily.     Lab Results   Component Value Date    HGBA1C 7.6 (H) 09/18/2023

## 2024-03-25 ENCOUNTER — APPOINTMENT (OUTPATIENT)
Dept: LAB | Facility: HOSPITAL | Age: 59
End: 2024-03-25
Attending: NURSE PRACTITIONER
Payer: COMMERCIAL

## 2024-03-28 ENCOUNTER — TELEPHONE (OUTPATIENT)
Dept: DIABETES | Facility: CLINIC | Age: 59
End: 2024-03-28
Payer: COMMERCIAL

## 2024-03-28 ENCOUNTER — OFFICE VISIT (OUTPATIENT)
Dept: INTERNAL MEDICINE | Facility: CLINIC | Age: 59
End: 2024-03-28
Payer: COMMERCIAL

## 2024-03-28 VITALS
WEIGHT: 244.5 LBS | DIASTOLIC BLOOD PRESSURE: 73 MMHG | HEIGHT: 67 IN | HEART RATE: 66 BPM | BODY MASS INDEX: 38.37 KG/M2 | SYSTOLIC BLOOD PRESSURE: 108 MMHG | RESPIRATION RATE: 16 BRPM | TEMPERATURE: 98 F

## 2024-03-28 DIAGNOSIS — E78.2 MIXED HYPERLIPIDEMIA: ICD-10-CM

## 2024-03-28 DIAGNOSIS — Z00.00 WELLNESS EXAMINATION: Primary | ICD-10-CM

## 2024-03-28 DIAGNOSIS — E66.9 OBESITY (BMI 30-39.9): ICD-10-CM

## 2024-03-28 DIAGNOSIS — I10 PRIMARY HYPERTENSION: ICD-10-CM

## 2024-03-28 DIAGNOSIS — E11.65 TYPE 2 DIABETES MELLITUS WITH HYPERGLYCEMIA, WITHOUT LONG-TERM CURRENT USE OF INSULIN: Chronic | ICD-10-CM

## 2024-03-28 PROCEDURE — 4010F ACE/ARB THERAPY RXD/TAKEN: CPT | Mod: CPTII,,, | Performed by: NURSE PRACTITIONER

## 2024-03-28 PROCEDURE — 3060F POS MICROALBUMINURIA REV: CPT | Mod: CPTII,,, | Performed by: NURSE PRACTITIONER

## 2024-03-28 PROCEDURE — 99214 OFFICE O/P EST MOD 30 MIN: CPT | Mod: S$PBB,,, | Performed by: NURSE PRACTITIONER

## 2024-03-28 PROCEDURE — 3008F BODY MASS INDEX DOCD: CPT | Mod: CPTII,,, | Performed by: NURSE PRACTITIONER

## 2024-03-28 PROCEDURE — G2211 COMPLEX E/M VISIT ADD ON: HCPCS | Mod: S$PBB,,, | Performed by: NURSE PRACTITIONER

## 2024-03-28 PROCEDURE — 1159F MED LIST DOCD IN RCRD: CPT | Mod: CPTII,,, | Performed by: NURSE PRACTITIONER

## 2024-03-28 PROCEDURE — 99214 OFFICE O/P EST MOD 30 MIN: CPT | Mod: PBBFAC | Performed by: NURSE PRACTITIONER

## 2024-03-28 PROCEDURE — 1160F RVW MEDS BY RX/DR IN RCRD: CPT | Mod: CPTII,,, | Performed by: NURSE PRACTITIONER

## 2024-03-28 PROCEDURE — 3074F SYST BP LT 130 MM HG: CPT | Mod: CPTII,,, | Performed by: NURSE PRACTITIONER

## 2024-03-28 PROCEDURE — 3066F NEPHROPATHY DOC TX: CPT | Mod: CPTII,,, | Performed by: NURSE PRACTITIONER

## 2024-03-28 PROCEDURE — 3078F DIAST BP <80 MM HG: CPT | Mod: CPTII,,, | Performed by: NURSE PRACTITIONER

## 2024-03-28 PROCEDURE — 3046F HEMOGLOBIN A1C LEVEL >9.0%: CPT | Mod: CPTII,,, | Performed by: NURSE PRACTITIONER

## 2024-03-28 RX ORDER — LISINOPRIL AND HYDROCHLOROTHIAZIDE 12.5; 2 MG/1; MG/1
1 TABLET ORAL DAILY
Qty: 90 TABLET | Refills: 1 | Status: SHIPPED | OUTPATIENT
Start: 2024-03-28 | End: 2024-09-24

## 2024-03-28 RX ORDER — EZETIMIBE 10 MG/1
10 TABLET ORAL NIGHTLY
Qty: 90 TABLET | Refills: 1 | Status: SHIPPED | OUTPATIENT
Start: 2024-03-28 | End: 2024-09-24

## 2024-03-28 RX ORDER — LISINOPRIL 20 MG/1
20 TABLET ORAL NIGHTLY
Qty: 90 TABLET | Refills: 1 | Status: SHIPPED | OUTPATIENT
Start: 2024-03-28 | End: 2024-09-24

## 2024-03-28 RX ORDER — BLOOD-GLUCOSE METER
EACH MISCELLANEOUS
COMMUNITY
Start: 2024-02-22

## 2024-03-28 RX ORDER — METFORMIN HYDROCHLORIDE 500 MG/1
500 TABLET ORAL 2 TIMES DAILY WITH MEALS
Qty: 180 TABLET | Refills: 0 | Status: SHIPPED | OUTPATIENT
Start: 2024-03-28 | End: 2024-05-28

## 2024-03-28 RX ORDER — LANCETS 33 GAUGE
EACH MISCELLANEOUS
COMMUNITY
Start: 2024-02-22

## 2024-03-28 RX ORDER — ROSUVASTATIN CALCIUM 40 MG/1
40 TABLET, COATED ORAL NIGHTLY
Qty: 90 TABLET | Refills: 1 | Status: SHIPPED | OUTPATIENT
Start: 2024-03-28 | End: 2024-09-24

## 2024-03-28 NOTE — ASSESSMENT & PLAN NOTE
A1C Not at Goal  Start RX metformin 500 mg BID   2 month f/u with labs   Continue to monitor AM fasting glucose  Follow ADA diet.  Avoid soda, simple sweets, and limit rice/pasta/bread/starches and consume brown options when possible.   Maintain healthy weight with BMI goal <30.   Perform aerobic exercise for 150 minutes per week (or 5 days a week for 30 minutes each day).   Examine feet daily.     Lab Results   Component Value Date    HGBA1C 11.5 (H) 03/25/2024

## 2024-03-28 NOTE — ASSESSMENT & PLAN NOTE
FLP At Goal   Continue RX rosuvastatin 40 mg q hs and Zetia 10 mg daily     Follow a low cholesterol, low saturated fat diet with less than 200 mg of cholesterol a day.   Avoid fried foods and high saturated fats (clinton, sausage, cookies, cakes, chips, cheese, whole milk, butter, mayonnaise, creamy dressings, gravy, stew, gumbo, boudin, cracklins and cream sauces).  Add flax seed or fish oil supplements to diet.   Increase dietary fiber.   Regular exercise improves cholesterol levels.  Physical activity 5 times a week for 30 minutes per day (or 150 minutes per week).   Stressed importance of dietary modifications.    Lab Results   Component Value Date    CHOL 132 03/25/2024     Lab Results   Component Value Date    HDL 36 03/25/2024     Lab Results   Component Value Date    TRIG 64 03/25/2024     Lab Results   Component Value Date    VLDL 13 03/25/2024     Lab Results   Component Value Date    LDL 83.00 03/25/2024

## 2024-03-28 NOTE — PROGRESS NOTES
Annamarie Galicia, FANG   [facility]  [address] [facility] [address]      PATIENT NAME: Jesus Martinez  : 1965  DATE: 3/28/24  MRN: [id number]      Reason for Visit / Chief Complaint: Health Maintenance and Diabetes (Lab review )       History of Present Illness / Problem Focused Workflow     Jesus Martinez presents to the clinic with Health Maintenance and Diabetes (Lab review )     58 yo AAM here today for f/u  Medical problems includes mild CAD, HLD, HTN, T2DM, Vitamin D deficiency, right knee pain, and ED. Former smoker.  <10 pack/yr history of smoking. Followed by [facility] and [facility]. Was referred to PT by [facility] - does not want to use.     Prostate Cancer Screening - 24 PSA 0.53  Colon Cancer Screening -  23 Cologuard Negative   Osteoporosis Screening - 24  Vitamin D level 40.5  HCV Screenin/15/22 Negative  Wellness Visit: 23  Pt here for in between OV for review of glucose levels. Pt went to ED on 24 for hyperglycemia, glucose was in 200's, pt was not treated. Pt reports today that he uses his wife's glucometer at home, does note have glucose log with him today. Instructed patient to keep AM fasting glucose log at home, will f/u with pt as previously scheduled in March with labs. Denies any acute issues today.     24  Pt here today for yearly wellness OV with labs completed; reviewed and dicussed with no questions. A1C is increased to 11.5 from 7.6 at LOV. Pt was instructed to bring glucose logs after our LOV, log reviewed notes mostly AM fasting averages int he 200 ranges. The pt reports a 10 pound weight gain x 1 month. Discussed with pt today starting oral diabetic agents,he is agreeable.Noting that GLP1 injection are too expensive for pt at this time. We will start RX metformin 500 mg BID, we will f/u in about 2 months with labs F2F for lab review and med dose increase. Pt will  continue with AM glucose checks and bring log to NOV. Pt is agreeable to referral to Diabetes Education today as well. All meds reviewed and refilled appropriately. Denies any acute issues today. Visit today included increased complexity associated with the care of the episodic problem T2DM addressed and managing the longitudinal care of the patient due to the serious and/or complex managed problem(s) T2DM.            Review of Systems     Review of Systems   Constitutional: Negative.    HENT: Negative.     Eyes: Negative.    Respiratory: Negative.     Cardiovascular: Negative.    Gastrointestinal: Negative.    Endocrine: Negative.    Genitourinary: Negative.    Neurological: Negative.    Psychiatric/Behavioral: Negative.           Medications and Allergies     Medications  Medication List with Changes/Refills   New Medications    METFORMIN (GLUCOPHAGE) 500 MG TABLET    Take 1 tablet (500 mg total) by mouth 2 (two) times daily with meals. For Diabetes   Current Medications    BLOOD SUGAR DIAGNOSTIC STRP    To check BG 2 times daily, to use with insurance preferred meter E11.9    CLOTRIMAZOLE (LOTRIMIN) 1 % CREAM    Apply topically 2 (two) times daily. for 10 days    EASY TOUCH TWIST LANCETS 33 GAUGE MISC    USE AS DIRECTED TO test TWICE DAILY    MENTHOL 3.5 % GEL    Apply topically.    TADALAFIL (CIALIS) 20 MG TAB    Take 1 tablet (20 mg total) by mouth daily as needed (Erectile Dysfunction).    TRUE METRIX GLUCOSE METER MISC    USE TO test TWICE DAILY   Changed and/or Refilled Medications    Modified Medication Previous Medication    EZETIMIBE (ZETIA) 10 MG TABLET ezetimibe (ZETIA) 10 mg tablet       Take 1 tablet (10 mg total) by mouth every evening. For High Cholesterol    Take 1 tablet (10 mg total) by mouth every evening. For High Cholesterol    LISINOPRIL (PRINIVIL,ZESTRIL) 20 MG TABLET lisinopriL (PRINIVIL,ZESTRIL) 20 MG tablet       Take 1 tablet (20 mg total) by mouth every evening. For High Blood Pressure     Take 1 tablet (20 mg total) by mouth every evening. For High Blood Pressure    LISINOPRIL-HYDROCHLOROTHIAZIDE (PRINZIDE,ZESTORETIC) 20-12.5 MG PER TABLET lisinopriL-hydrochlorothiazide (PRINZIDE,ZESTORETIC) 20-12.5 mg per tablet       Take 1 tablet by mouth once daily. For High Blood Pressure    Take 1 tablet by mouth once daily. For High Blood Pressure    ROSUVASTATIN (CRESTOR) 40 MG TAB rosuvastatin (CRESTOR) 40 MG Tab       Take 1 tablet (40 mg total) by mouth every evening. For High Cholesterol    Take 1 tablet (40 mg total) by mouth every evening. For High Cholesterol   Discontinued Medications    BLOOD-GLUCOSE METER KIT    To check BG 2 times daily, to use with insurance preferred meter E11.9    LANCETS MISC    To check BG 2 times daily, to use with insurance preferred meter E11.9         Allergies  Review of patient's allergies indicates:  No Known Allergies    Physical Examination     Vitals:    03/28/24 0727   BP: 108/73   Pulse: 66   Resp: 16   Temp: 98.2 °F (36.8 °C)     Physical Exam  Vitals reviewed.   Constitutional:       Appearance: Normal appearance. He is normal weight.   HENT:      Head: Normocephalic.   Cardiovascular:      Rate and Rhythm: Normal rate and regular rhythm.      Pulses: Normal pulses.      Heart sounds: Normal heart sounds.   Pulmonary:      Effort: Pulmonary effort is normal.      Breath sounds: Normal breath sounds.   Abdominal:      General: Abdomen is flat.      Palpations: Abdomen is soft.   Musculoskeletal:         General: Normal range of motion.      Cervical back: Normal range of motion.   Skin:     General: Skin is warm and dry.   Neurological:      Mental Status: He is alert.   Psychiatric:         Mood and Affect: Mood normal.           Results     Lab Results   Component Value Date    WBC 5.34 03/25/2024    RBC 4.81 03/25/2024    HGB 13.2 (L) 03/25/2024    HCT 41.7 (L) 03/25/2024    MCV 86.7 03/25/2024    MCH 27.4 03/25/2024    MCHC 31.7 (L) 03/25/2024    RDW 12.3  03/25/2024     03/25/2024    MPV 9.1 03/25/2024     Sodium Level   Date Value Ref Range Status   03/25/2024 137 136 - 145 mmol/L Final     Potassium Level   Date Value Ref Range Status   03/25/2024 3.9 3.5 - 5.1 mmol/L Final     Carbon Dioxide   Date Value Ref Range Status   03/25/2024 27 23 - 31 mmol/L Final     Blood Urea Nitrogen   Date Value Ref Range Status   03/25/2024 9.9 8.4 - 25.7 mg/dL Final     Creatinine   Date Value Ref Range Status   03/25/2024 0.83 0.73 - 1.18 mg/dL Final     Calcium Level Total   Date Value Ref Range Status   03/25/2024 9.6 8.8 - 10.0 mg/dL Final     Albumin Level   Date Value Ref Range Status   03/25/2024 3.5 3.4 - 4.8 g/dL Final     Bilirubin Total   Date Value Ref Range Status   03/25/2024 0.4 <=1.5 mg/dL Final     Alkaline Phosphatase   Date Value Ref Range Status   03/25/2024 35 (L) 40 - 150 unit/L Final     Aspartate Aminotransferase   Date Value Ref Range Status   03/25/2024 15 5 - 34 unit/L Final     Alanine Aminotransferase   Date Value Ref Range Status   03/25/2024 17 0 - 55 unit/L Final     Estimated GFR-Non    Date Value Ref Range Status   03/10/2022 >105 >=90      Lab Results   Component Value Date    CHOL 132 03/25/2024     Lab Results   Component Value Date    HDL 36 03/25/2024     Lab Results   Component Value Date    TRIG 64 03/25/2024     Lab Results   Component Value Date    VLDL 13 03/25/2024     Lab Results   Component Value Date    LDL 83.00 03/25/2024     Lab Results   Component Value Date    TSH 0.923 03/25/2024     Lab Results   Component Value Date    PHUR 6.5 02/21/2024    SPECGRAV 1.015 02/21/2024    PROTEINUA Negative 03/25/2024    GLUCUA Normal 03/10/2022    KETONESU Negative 02/21/2024    OCCULTUA Negative 03/10/2022    NITRITE Negative 02/21/2024    LEUKOCYTESUR Negative 03/25/2024     Lab Results   Component Value Date    HGBA1C 11.5 (H) 03/25/2024    HGBA1C 7.6 (H) 09/18/2023    HGBA1C 6.4 03/16/2023           Assessment          ICD-10-CM ICD-9-CM   1. Wellness examination  Z00.00 V70.0   2. Mixed hyperlipidemia  E78.2 272.2   3. Primary hypertension  I10 401.9   4. Type 2 diabetes mellitus with hyperglycemia, without long-term current use of insulin  E11.65 250.00     790.29   5. Obesity (BMI 30-39.9)  E66.9 278.00       Plan      Problem List Items Addressed This Visit          Cardiac/Vascular    Primary hypertension    Current Assessment & Plan     BP At Goal  Continue RX lisinopril 20 mg daily / hctz/lisinopril 12.5 / 20 mg daily  BP: 108/73  Low Sodium Diet (Dash Diet - less than 2 grams of sodium per day).  Maintain healthy weight with goal BMI <30. Exercise 30 minutes per day 5 days per week.               Relevant Medications    lisinopriL (PRINIVIL,ZESTRIL) 20 MG tablet    lisinopriL-hydrochlorothiazide (PRINZIDE,ZESTORETIC) 20-12.5 mg per tablet    Mixed hyperlipidemia    Current Assessment & Plan     FLP At Goal   Continue RX rosuvastatin 40 mg q hs and Zetia 10 mg daily     Follow a low cholesterol, low saturated fat diet with less than 200 mg of cholesterol a day.   Avoid fried foods and high saturated fats (clinton, sausage, cookies, cakes, chips, cheese, whole milk, butter, mayonnaise, creamy dressings, gravy, stew, gumbo, boudin, cracklins and cream sauces).  Add flax seed or fish oil supplements to diet.   Increase dietary fiber.   Regular exercise improves cholesterol levels.  Physical activity 5 times a week for 30 minutes per day (or 150 minutes per week).   Stressed importance of dietary modifications.    Lab Results   Component Value Date    CHOL 132 03/25/2024     Lab Results   Component Value Date    HDL 36 03/25/2024     Lab Results   Component Value Date    TRIG 64 03/25/2024     Lab Results   Component Value Date    VLDL 13 03/25/2024     Lab Results   Component Value Date    LDL 83.00 03/25/2024            Relevant Medications    ezetimibe (ZETIA) 10 mg tablet    rosuvastatin (CRESTOR) 40 MG Tab       Endocrine     Type 2 diabetes mellitus with hyperglycemia, without long-term current use of insulin (Chronic)    Current Assessment & Plan     A1C Not at Goal  Start RX metformin 500 mg BID   2 month f/u with labs   Continue to monitor AM fasting glucose  Follow ADA diet.  Avoid soda, simple sweets, and limit rice/pasta/bread/starches and consume brown options when possible.   Maintain healthy weight with BMI goal <30.   Perform aerobic exercise for 150 minutes per week (or 5 days a week for 30 minutes each day).   Examine feet daily.     Lab Results   Component Value Date    HGBA1C 11.5 (H) 03/25/2024            Relevant Medications    metFORMIN (GLUCOPHAGE) 500 MG tablet    Other Relevant Orders    Urinalysis, Reflex to Urine Culture    Microalbumin/Creatinine Ratio, Urine    Hemoglobin A1C    Basic Metabolic Panel    Ambulatory referral/consult to Diabetes Education    Obesity (BMI 30-39.9)    Current Assessment & Plan     Goal BMI < 30  Dicussed Healthy Diet &   Encouraged to exercise 3 x weekly  Increase Water Intake  Eat more fruits and vegetables  Avoid soda & alcohol              Other    Wellness examination - Primary    Current Assessment & Plan     Pt wellness visit completed today with appropriate lab work.   HM Topics Reviewed / Updated  Immunizations Discussed  Dicussed Healthy Diet &   Encouraged to exercise 3 x weekly  Increase Water Intake  Eat more fruits and vegetables  Avoid soda & alcohol              Future Appointments   Date Time Provider Department Center   5/28/2024 12:20 PM Annamarie Galicia FNP [facility] [place] [facility]   8/21/2024  2:30 PM Vero Lugo DO [facility] [place] [facility]            Signature:     [facility]  [address] [facility] [address]    Date of encounter: 3/28/24

## 2024-03-28 NOTE — ASSESSMENT & PLAN NOTE
BP At Goal  Continue RX lisinopril 20 mg daily / hctz/lisinopril 12.5 / 20 mg daily  BP: 108/73  Low Sodium Diet (Dash Diet - less than 2 grams of sodium per day).  Maintain healthy weight with goal BMI <30. Exercise 30 minutes per day 5 days per week.

## 2024-04-17 ENCOUNTER — CLINICAL SUPPORT (OUTPATIENT)
Dept: DIABETES | Facility: CLINIC | Age: 59
End: 2024-04-17
Payer: COMMERCIAL

## 2024-04-17 VITALS — BODY MASS INDEX: 38.89 KG/M2 | WEIGHT: 248.31 LBS

## 2024-04-17 DIAGNOSIS — E11.65 TYPE 2 DIABETES MELLITUS WITH HYPERGLYCEMIA, WITHOUT LONG-TERM CURRENT USE OF INSULIN: Chronic | ICD-10-CM

## 2024-04-17 PROCEDURE — G0108 DIAB MANAGE TRN  PER INDIV: HCPCS | Mod: PBBFAC,PN | Performed by: DIETITIAN, REGISTERED

## 2024-04-17 PROCEDURE — 99212 OFFICE O/P EST SF 10 MIN: CPT | Mod: PBBFAC,PN | Performed by: DIETITIAN, REGISTERED

## 2024-04-18 NOTE — PROGRESS NOTES
Diabetes Care Specialist Progress Note  Author: Kylee Veronica RD  Date: 4/18/2024    Program Intake  Reason for Diabetes Program Visit:: Post Program Follow-Up  Type of Follow-Up: 12 month  Current diabetes risk level:: moderate  Continuous Glucose Monitoring  Patient has CGM: No    Lab Results   Component Value Date    HGBA1C 11.5 (H) 03/25/2024       Clinical    Weight: 112.6 kg (248 lb 4.8 oz)       Body mass index is 38.89 kg/m².         Problem Review  Reviewed health maintenance: yes    Clinical Assessment  Current Diabetes Treatment: Oral Medication (Metformin 500 mg BID)  Have you ever experienced hypoglycemia (low blood sugar)?: no  Have you ever experienced hyperglycemia (high blood sugar)?: no    Medication Information  How do you obtain your medications?: Patient drives  How many days a week do you miss your medications?: Never  Do you sometimes have difficulty refilling your medications?: No  Medication adherence impacting ability to self-manage diabetes?: No    Labs  Do you have regular lab work to monitor your medications?: Yes  Type of Regular Lab Work: A1c  Where do you get your labs drawn?: [facility]  Lab Compliance Barriers: No    Nutritional Status  Diet: Diabetic diet  Meal Plan 24 Hour Recall: Lunch, Dinner, Snack  Meal Plan 24 Hour Recall - Lunch: salads  Meal Plan 24 Hour Recall - Dinner: meat and non-starchy vegetables  Meal Plan 24 Hour Recall - Snack: small pieces of fruit. Stopped eating M&Ms and drinking soda. Now drinks water and sugar-free water additives  Change in appetite?: No  Dentation:: Intact  Recent Changes in Weight: Weight Loss (50 # since last DE visit 9/29/22)  Was weight loss intentional or unintentional?: Intentional  Current nutritional status an area of need that is impacting patient's ability to self-manage diabetes?: No    Additional Social History    Support  Does anyone support you with your diabetes care?: yes  Who supports you?: spouse  Who takes you to your  medical appointments?: self  Does the current support meet the patient's needs?: Yes  Is Support an area impacting ability to self-manage diabetes?: No    Access to Mass Media & Technology  Does the patient have access to any of the following devices or technologies?: Smart phone  Media or technology needs impacting ability to self-manage diabetes?: No    Cognitive/Behavioral Health  Alert and Oriented: Yes  Difficulty Thinking: No  Requires Prompting: No  Requires assistance for routine expression?: No  Cognitive or behavioral barriers impacting ability to self-manage diabetes?: No         Communication  Language preference: English  Hearing Problems: No  Vision Problems: No  Communication needs impacting ability to self-manage diabetes?: No    Health Literacy  Preferred Learning Method: Reading Materials, Face to Face  How often do you need to have someone help you read instructions, pamphlets, or written material from your doctor or pharmacy?: Never  Health literacy needs impacting ability to self-manage diabetes?: No      Diabetes Self-Management Skills Assessment    Diabetes Disease Process/Treatment Options  Patient/caregiver able to state what happens when someone has diabetes.: yes  Patient/caregiver knows what type of diabetes they have.: no  Diabetes Disease Process/Treatment Options: Skills Assessment Completed: Yes  Assessment indicates:: Instruction Needed  Area of need?: Yes    Nutrition/Healthy Eating  Method of carbohydrate measurement:: eyeballing/guessing  Patient can identify foods that impact blood sugar.: yes  Patient-identified foods:: sweets, starches (bread, pasta, rice, cereal), fruit/fruit juice, soda  Nutrition/Healthy Eating Skills Assessment Completed:: Yes  Assessment indicates:: Instruction Needed  Area of need?: Yes    Physical Activity/Exercise  Patient's daily activity level:: constantly moving  Patient formally exercises outside of work.: yes  Exercise Type: walking  Intensity:  Moderate  Frequency: four or more times a week  Patient can identify forms of physical activity.: yes  Patient can identify reasons why exercise/physical activity is important in diabetes management.: yes  Identified reasons:: keeps body and joints flexible, strengthens heart, muscles, and bones, lowers blood glucose, blood pressure, and cholesterol  Physical Activity/Exercise Skills Assessment Completed: : Yes  Assessment indicates:: Adequate understanding  Area of need?: No    Medications  Patient is able to describe current diabetes management routine.: yes  Diabetes management routine:: diet, exercise, oral medications  Patient is able to identify current diabetes medications, dosages, and appropriate timing of medications.: yes  Patient understands the purpose of the medications taken for diabetes.: no  Patient reports problems or concerns with current medication regimen.: no  Medication Skills Assessment Completed:: Yes  Assessment indicates:: Instruction Needed  Area of need?: Yes    Home Blood Glucose Monitoring  Patient states that blood sugar is checked at home daily.: yes  Monitoring Method:: home glucometer  How often do you check your blood sugar?: Once a day  When do you check your blood sugar?: Before breakfast  When you check what is your typical blood sugar range? :   Blood glucose logs reviewed today?: no  Home Blood Glucose Monitoring Skills Assessment Completed: : Yes  Assessment indicates:: Adequate understanding  Area of need?: No    Acute Complications  Patient is able to identify types of acute complications: No  Acute Complications Skills Assessment Completed: : Yes  Assessment indicates:: Instruction Needed  Area of need?: Yes    Chronic Complications  Patient can identify major chronic complications of diabetes.: yes  Stated chronic complications:: heart disease/heart attack  Patient can identify ways to prevent or delay diabetes complications.: yes  Stated ways to prevent  complications:: healthy eating and regular activity  Patient is taking statin?: Yes  Chronic Complications Skills Assessment Completed: : Yes  Assessment indicates:: Instruction Needed  Area of need?: Yes    Psychosocial/Coping  Patient can identify ways of coping with chronic disease.: yes  Patient-stated ways of coping with chronic disease:: support from loved ones, other (see comments) (walking)  Psychosocial/Coping Skills Assessment Completed: : Yes  Assessment indicates:: Adequate understanding  Area of need?: No      Assessment Summary and Plan    Based on today's diabetes care assessment, the following areas of need were identified:          4/17/2024    12:01 AM   Social   Support No   Access to Mass Media/Tech No   Cognitive/Behavioral Health No   Communication No   Health Literacy No            4/17/2024    12:01 AM   Clinical   Medication Adherence No   Lab Compliance No   Nutritional Status No            4/17/2024    12:01 AM   Diabetes Self-Management Skills   Diabetes Disease Process/Treatment Options Yes - Reviewed diabetes pathophysiology, different types of diabetes, signs and symptoms, risk factors and treatment guidelines.    Nutrition/Healthy Eating Yes - Reviewed the sources and role of Carbohydrate, Protein, and Fat and how each nutrient impact blood sugar. Reviewed label reading for Total Carbohydrates and how sugars impact total carbohydrates. Reviewed serving sizes of starches, milk, fruit, starchy vegetables and snacks. Reviewed non-starchy vegetable list. Pt states he was eating M&Ms and drinking soda before his A1C increased to 11.5 and has since stopped.   Physical Activity/Exercise No   Medication Yes - Provided review of current diabetes medication action, dosage, frequency, side effects. Reviewed the importance of meal and medication timing with diabetes mediations for prevention of acute complications and maximum drug benefit.   Home Blood Glucose Monitoring No. Reviewed sharps  disposal.   Acute Complications Yes - Discussed prevention, identification signs/symptoms and treatment of hyperglycemia and hypoglycemia and when to contact clinic.    Chronic Complications Yes - see care plan.    Psychosocial/Coping No          Today's interventions were provided through individual discussion, instruction, and written materials were provided.      Patient verbalized understanding of instruction and written materials.  Pt was able to return back demonstration of instructions today. Patient understood key points, needs reinforcement and further instruction.     Diabetes Self-Management Care Plan:    Today's Diabetes Self-Management Care Plan was developed with Jesus's input. Jesus has agreed to work toward the following goal(s) to improve his/her overall diabetes control.      Care Plan: Diabetes Management   Updates made since 3/19/2024 12:00 AM        Problem: Physical Activity and Exercise Resolved 4/18/2024        Goal: Patient agrees to increase physical activity to a goal of 3-4 times per week for 1.5 miles. Completed 4/18/2024   Start Date: 9/29/2022   This Visit's Progress: Met   Priority: High   Barriers: No Barriers Identified   Note:    9/29/22 - Pt works in [employer] and [place] to a park. Pt stated goal is to take a .5 mile walk for his lunch break. He intends to also walk 1 mile after work.    4/18/24 - Pt has been walking daily at work several times per day. States his knees feel better since he lost weight.       Problem: Chronic Complications         Goal: Practice foot care daily    Start Date: 4/18/2024   Expected End Date: 6/19/2024   Priority: Medium   Barriers: No Barriers Identified        Task: Discussed current A1c, Blood Pressure, and Cholesterol results (ABCs of Diabetes) and reviewed targets of each. Completed 4/18/2024        Task: Discussed importance of annual microalbumin urine test to monitor kidney function. Completed 4/18/2024        Task:  Discussed importance of annual dilated eye exam for prevention of retinopathy. Completed 4/18/2024        Task: Instructed on basic daily foot care and encouraged inspecting feet daily. Completed 4/18/2024          Follow Up Plan     Follow up in about 2 months (around 6/17/2024) for glucose log review.    Today's care plan and follow up schedule was discussed with patient.  Jesus verbalized understanding of the care plan, goals, and agrees to follow up plan.        The patient was encouraged to communicate with his/her health care provider/physician and care team regarding his/her condition(s) and treatment.  I provided the patient with my contact information today and encouraged to contact me via phone or [facility] Patient Portal as needed.     Length of Visit   Total Time: 60 Minutes

## 2024-05-24 ENCOUNTER — LAB VISIT (OUTPATIENT)
Dept: LAB | Facility: HOSPITAL | Age: 59
End: 2024-05-24
Attending: NURSE PRACTITIONER
Payer: COMMERCIAL

## 2024-05-24 DIAGNOSIS — E11.65 TYPE 2 DIABETES MELLITUS WITH HYPERGLYCEMIA, WITHOUT LONG-TERM CURRENT USE OF INSULIN: Chronic | ICD-10-CM

## 2024-05-24 LAB
ANION GAP SERPL CALC-SCNC: 8 MEQ/L
BACTERIA #/AREA URNS AUTO: ABNORMAL /HPF
BILIRUB UR QL STRIP.AUTO: NEGATIVE
BUN SERPL-MCNC: 16 MG/DL (ref 8.4–25.7)
CALCIUM SERPL-MCNC: 9.6 MG/DL (ref 8.8–10)
CHLORIDE SERPL-SCNC: 105 MMOL/L (ref 98–107)
CLARITY UR: CLEAR
CO2 SERPL-SCNC: 26 MMOL/L (ref 23–31)
COLOR UR AUTO: ABNORMAL
CREAT SERPL-MCNC: 0.85 MG/DL (ref 0.73–1.18)
CREAT UR-MCNC: 143.4 MG/DL (ref 63–166)
CREAT/UREA NIT SERPL: 19
EST. AVERAGE GLUCOSE BLD GHB EST-MCNC: 168.6 MG/DL
GFR SERPLBLD CREATININE-BSD FMLA CKD-EPI: >60 ML/MIN/1.73/M2
GLUCOSE SERPL-MCNC: 102 MG/DL (ref 82–115)
GLUCOSE UR QL STRIP: NORMAL
HBA1C MFR BLD: 7.5 %
HGB UR QL STRIP: NEGATIVE
HYALINE CASTS #/AREA URNS LPF: ABNORMAL /LPF
KETONES UR QL STRIP: NEGATIVE
LEUKOCYTE ESTERASE UR QL STRIP: NEGATIVE
MICROALBUMIN UR-MCNC: 161.3 UG/ML
MICROALBUMIN/CREAT RATIO PNL UR: 112.5 MG/GM CR (ref 0–30)
MUCOUS THREADS URNS QL MICRO: ABNORMAL /LPF
NITRITE UR QL STRIP: NEGATIVE
PH UR STRIP: 5.5 [PH]
POTASSIUM SERPL-SCNC: 3.6 MMOL/L (ref 3.5–5.1)
PROT UR QL STRIP: ABNORMAL
RBC #/AREA URNS AUTO: ABNORMAL /HPF
SODIUM SERPL-SCNC: 139 MMOL/L (ref 136–145)
SP GR UR STRIP.AUTO: 1.03 (ref 1–1.03)
SQUAMOUS #/AREA URNS LPF: ABNORMAL /HPF
UROBILINOGEN UR STRIP-ACNC: NORMAL
WBC #/AREA URNS AUTO: ABNORMAL /HPF

## 2024-05-24 PROCEDURE — 83036 HEMOGLOBIN GLYCOSYLATED A1C: CPT

## 2024-05-24 PROCEDURE — 81001 URINALYSIS AUTO W/SCOPE: CPT

## 2024-05-24 PROCEDURE — 80048 BASIC METABOLIC PNL TOTAL CA: CPT

## 2024-05-24 PROCEDURE — 36415 COLL VENOUS BLD VENIPUNCTURE: CPT

## 2024-05-24 PROCEDURE — 82043 UR ALBUMIN QUANTITATIVE: CPT

## 2024-05-28 ENCOUNTER — OFFICE VISIT (OUTPATIENT)
Dept: INTERNAL MEDICINE | Facility: CLINIC | Age: 59
End: 2024-05-28
Payer: COMMERCIAL

## 2024-05-28 VITALS
HEART RATE: 65 BPM | TEMPERATURE: 98 F | WEIGHT: 246 LBS | DIASTOLIC BLOOD PRESSURE: 73 MMHG | RESPIRATION RATE: 16 BRPM | BODY MASS INDEX: 38.61 KG/M2 | HEIGHT: 67 IN | SYSTOLIC BLOOD PRESSURE: 119 MMHG

## 2024-05-28 DIAGNOSIS — E11.65 TYPE 2 DIABETES MELLITUS WITH HYPERGLYCEMIA, WITHOUT LONG-TERM CURRENT USE OF INSULIN: Chronic | ICD-10-CM

## 2024-05-28 DIAGNOSIS — Z11.4 SCREENING FOR HIV (HUMAN IMMUNODEFICIENCY VIRUS): Primary | ICD-10-CM

## 2024-05-28 PROCEDURE — 3051F HG A1C>EQUAL 7.0%<8.0%: CPT | Mod: CPTII,,, | Performed by: NURSE PRACTITIONER

## 2024-05-28 PROCEDURE — 99213 OFFICE O/P EST LOW 20 MIN: CPT | Mod: PBBFAC | Performed by: NURSE PRACTITIONER

## 2024-05-28 PROCEDURE — 3008F BODY MASS INDEX DOCD: CPT | Mod: CPTII,,, | Performed by: NURSE PRACTITIONER

## 2024-05-28 PROCEDURE — 1159F MED LIST DOCD IN RCRD: CPT | Mod: CPTII,,, | Performed by: NURSE PRACTITIONER

## 2024-05-28 PROCEDURE — 4010F ACE/ARB THERAPY RXD/TAKEN: CPT | Mod: CPTII,,, | Performed by: NURSE PRACTITIONER

## 2024-05-28 PROCEDURE — 3060F POS MICROALBUMINURIA REV: CPT | Mod: CPTII,,, | Performed by: NURSE PRACTITIONER

## 2024-05-28 PROCEDURE — 3074F SYST BP LT 130 MM HG: CPT | Mod: CPTII,,, | Performed by: NURSE PRACTITIONER

## 2024-05-28 PROCEDURE — 3066F NEPHROPATHY DOC TX: CPT | Mod: CPTII,,, | Performed by: NURSE PRACTITIONER

## 2024-05-28 PROCEDURE — 1160F RVW MEDS BY RX/DR IN RCRD: CPT | Mod: CPTII,,, | Performed by: NURSE PRACTITIONER

## 2024-05-28 PROCEDURE — 3078F DIAST BP <80 MM HG: CPT | Mod: CPTII,,, | Performed by: NURSE PRACTITIONER

## 2024-05-28 PROCEDURE — 99214 OFFICE O/P EST MOD 30 MIN: CPT | Mod: S$PBB,,, | Performed by: NURSE PRACTITIONER

## 2024-05-28 RX ORDER — METFORMIN HYDROCHLORIDE 1000 MG/1
1000 TABLET ORAL 2 TIMES DAILY WITH MEALS
Qty: 180 TABLET | Refills: 1 | Status: SHIPPED | OUTPATIENT
Start: 2024-05-28 | End: 2024-11-24

## 2024-05-28 NOTE — PROGRESS NOTES
Annamarie Galicia, FANG   [facility]  [address] [address]      PATIENT NAME: Jesus Martinez  : 1965  DATE: 24  MRN: [id number]      Reason for Visit / Chief Complaint: Health Maintenance (Lab review )       History of Present Illness / Problem Focused Workflow     Jesus Martinez presents to the clinic with Health Maintenance (Lab review )     60 yo AAM here today for f/u  Medical problems includes mild CAD, HLD, HTN, T2DM, Vitamin D deficiency, right knee pain, and ED. Former smoker.  <10 pack/yr history of smoking. Followed by [facility] and [facility]. Was referred to PT by [facility] - does not want to use.     Prostate Cancer Screening - 24 PSA 0.53  Colon Cancer Screening -  23 Cologuard Negative   Osteoporosis Screening - 24  Vitamin D level 40.5  HCV Screenin/15/22 Negative  Wellness Visit: 23  Pt here for in between OV for review of glucose levels. Pt went to ED on 24 for hyperglycemia, glucose was in 200's, pt was not treated. Pt reports today that he uses his wife's glucometer at home, does note have glucose log with him today. Instructed patient to keep AM fasting glucose log at home, will f/u with pt as previously scheduled in March with labs. Denies any acute issues today.     24  Pt here today for yearly wellness OV with labs completed; reviewed and dicussed with no questions. A1C is increased to 11.5 from 7.6 at LOV. Pt was instructed to bring glucose logs after our LOV, log reviewed notes mostly AM fasting averages int he 200 ranges. The pt reports a 10 pound weight gain x 1 month. Discussed with pt today starting oral diabetic agents,he is agreeable.Noting that GLP1 injection are too expensive for pt at this time. We will start RX metformin 500 mg BID, we will f/u in about 2 months with labs F2F for lab review and med dose increase. Pt will continue with AM glucose  checks and bring log to NOV. Pt is agreeable to referral to Diabetes Education today as well. All meds reviewed and refilled appropriately. Denies any acute issues today. Visit today included increased complexity associated with the care of the episodic problem T2DM addressed and managing the longitudinal care of the patient due to the serious and/or complex managed problem(s) T2DM.    05/28/24  Pt here today for f/u for T2DM with labs completed; A1C decreased from 11.5 to 7.5 since our LOV. The pt reports compliance with metformin 500 mg BID. Will increase today to 1000 mg BID and f/u again in about 3 months with labs. Pt denies any acute issues or concerns today.           Review of Systems     Review of Systems   Constitutional: Negative.    HENT: Negative.     Eyes: Negative.    Respiratory: Negative.     Cardiovascular: Negative.    Gastrointestinal: Negative.    Endocrine: Negative.    Genitourinary: Negative.    Neurological: Negative.    Psychiatric/Behavioral: Negative.           Medications and Allergies     Medications  Medication List with Changes/Refills   Current Medications    BLOOD SUGAR DIAGNOSTIC STRP    To check BG 2 times daily, to use with insurance preferred meter E11.9    CLOTRIMAZOLE (LOTRIMIN) 1 % CREAM    Apply topically 2 (two) times daily. for 10 days    EASY TOUCH TWIST LANCETS 33 GAUGE MISC    USE AS DIRECTED TO test TWICE DAILY    EZETIMIBE (ZETIA) 10 MG TABLET    Take 1 tablet (10 mg total) by mouth every evening. For High Cholesterol    LISINOPRIL (PRINIVIL,ZESTRIL) 20 MG TABLET    Take 1 tablet (20 mg total) by mouth every evening. For High Blood Pressure    LISINOPRIL-HYDROCHLOROTHIAZIDE (PRINZIDE,ZESTORETIC) 20-12.5 MG PER TABLET    Take 1 tablet by mouth once daily. For High Blood Pressure    MENTHOL 3.5 % GEL    Apply topically.    ROSUVASTATIN (CRESTOR) 40 MG TAB    Take 1 tablet (40 mg total) by mouth every evening. For High Cholesterol    TADALAFIL (CIALIS) 20 MG TAB    Take  1 tablet (20 mg total) by mouth daily as needed (Erectile Dysfunction).    TRUE METRIX GLUCOSE METER MISC    USE TO test TWICE DAILY   Changed and/or Refilled Medications    Modified Medication Previous Medication    METFORMIN (GLUCOPHAGE) 1000 MG TABLET metFORMIN (GLUCOPHAGE) 500 MG tablet       Take 1 tablet (1,000 mg total) by mouth 2 (two) times daily with meals. For Diabetes    Take 1 tablet (500 mg total) by mouth 2 (two) times daily with meals. For Diabetes         Allergies  Review of patient's allergies indicates:  No Known Allergies    Physical Examination     Vitals:    05/28/24 1235   BP: 119/73   Pulse: 65   Resp: 16   Temp: 98.3 °F (36.8 °C)     Physical Exam  Vitals reviewed.   Constitutional:       Appearance: Normal appearance. He is obese.   HENT:      Head: Normocephalic.   Cardiovascular:      Rate and Rhythm: Normal rate and regular rhythm.      Pulses: Normal pulses.      Heart sounds: Normal heart sounds.   Pulmonary:      Effort: Pulmonary effort is normal.      Breath sounds: Normal breath sounds.   Abdominal:      General: Abdomen is flat.      Palpations: Abdomen is soft.   Musculoskeletal:         General: Normal range of motion.      Cervical back: Normal range of motion.   Skin:     General: Skin is warm and dry.   Neurological:      Mental Status: He is alert.   Psychiatric:         Mood and Affect: Mood normal.           Results     Lab Results   Component Value Date    WBC 5.34 03/25/2024    RBC 4.81 03/25/2024    HGB 13.2 (L) 03/25/2024    HCT 41.7 (L) 03/25/2024    MCV 86.7 03/25/2024    MCH 27.4 03/25/2024    MCHC 31.7 (L) 03/25/2024    RDW 12.3 03/25/2024     03/25/2024    MPV 9.1 03/25/2024     Sodium   Date Value Ref Range Status   05/24/2024 139 136 - 145 mmol/L Final     Potassium   Date Value Ref Range Status   05/24/2024 3.6 3.5 - 5.1 mmol/L Final     CO2   Date Value Ref Range Status   05/24/2024 26 23 - 31 mmol/L Final     Blood Urea Nitrogen   Date Value Ref Range  Status   05/24/2024 16.0 8.4 - 25.7 mg/dL Final     Creatinine   Date Value Ref Range Status   05/24/2024 0.85 0.73 - 1.18 mg/dL Final     Calcium   Date Value Ref Range Status   05/24/2024 9.6 8.8 - 10.0 mg/dL Final     Albumin   Date Value Ref Range Status   03/25/2024 3.5 3.4 - 4.8 g/dL Final     Bilirubin Total   Date Value Ref Range Status   03/25/2024 0.4 <=1.5 mg/dL Final     ALP   Date Value Ref Range Status   03/25/2024 35 (L) 40 - 150 unit/L Final     AST   Date Value Ref Range Status   03/25/2024 15 5 - 34 unit/L Final     ALT   Date Value Ref Range Status   03/25/2024 17 0 - 55 unit/L Final     Estimated GFR-Non    Date Value Ref Range Status   03/10/2022 >105 >=90      Lab Results   Component Value Date    CHOL 132 03/25/2024     Lab Results   Component Value Date    HDL 36 03/25/2024     Lab Results   Component Value Date    TRIG 64 03/25/2024     Lab Results   Component Value Date    VLDL 13 03/25/2024     Lab Results   Component Value Date    LDL 83.00 03/25/2024     Lab Results   Component Value Date    TSH 0.923 03/25/2024     Lab Results   Component Value Date    PHUR 6.5 02/21/2024    SPECGRAV 1.015 02/21/2024    PROTEINUA 1+ (A) 05/24/2024    GLUCUA Normal 03/10/2022    KETONESU Negative 02/21/2024    OCCULTUA Negative 03/10/2022    NITRITE Negative 02/21/2024    LEUKOCYTESUR Negative 05/24/2024     Lab Results   Component Value Date    HGBA1C 7.5 (H) 05/24/2024    HGBA1C 11.5 (H) 03/25/2024    HGBA1C 7.6 (H) 09/18/2023           Assessment         ICD-10-CM ICD-9-CM   1. Screening for HIV (human immunodeficiency virus)  Z11.4 V73.89   2. Type 2 diabetes mellitus with hyperglycemia, without long-term current use of insulin  E11.65 250.00     790.29       Plan      Problem List Items Addressed This Visit          Endocrine    Type 2 diabetes mellitus with hyperglycemia, without long-term current use of insulin (Chronic)    Current Assessment & Plan     A1C Not at Goal  Start RX  metformin 500 mg BID   2 month f/u with labs   Continue to monitor AM fasting glucose  Follow ADA diet.  Avoid soda, simple sweets, and limit rice/pasta/bread/starches and consume brown options when possible.   Maintain healthy weight with BMI goal <30.   Perform aerobic exercise for 150 minutes per week (or 5 days a week for 30 minutes each day).   Examine feet daily.     Lab Results   Component Value Date    HGBA1C 7.5 (H) 05/24/2024              Relevant Medications    metFORMIN (GLUCOPHAGE) 1000 MG tablet     Other Visit Diagnoses       Screening for HIV (human immunodeficiency virus)    -  Primary    Relevant Orders    HIV 1/2 Ag/Ab (4th Gen)            Future Appointments   Date Time Provider Department Center   6/19/2024  2:00 PM Kylee Veronica RD [facility]   8/21/2024  2:30 [facility]            Signature:     [facility]  [address] [facility] [address]    Date of encounter: 5/28/24

## 2024-05-28 NOTE — ASSESSMENT & PLAN NOTE
A1C Not at Goal  Start RX metformin 500 mg BID   2 month f/u with labs   Continue to monitor AM fasting glucose  Follow ADA diet.  Avoid soda, simple sweets, and limit rice/pasta/bread/starches and consume brown options when possible.   Maintain healthy weight with BMI goal <30.   Perform aerobic exercise for 150 minutes per week (or 5 days a week for 30 minutes each day).   Examine feet daily.     Lab Results   Component Value Date    HGBA1C 7.5 (H) 05/24/2024

## 2024-06-19 ENCOUNTER — CLINICAL SUPPORT (OUTPATIENT)
Dept: DIABETES | Facility: CLINIC | Age: 59
End: 2024-06-19
Payer: COMMERCIAL

## 2024-06-19 VITALS — BODY MASS INDEX: 38.47 KG/M2 | WEIGHT: 245.63 LBS

## 2024-06-19 DIAGNOSIS — E11.65 TYPE 2 DIABETES MELLITUS WITH HYPERGLYCEMIA, WITHOUT LONG-TERM CURRENT USE OF INSULIN: Primary | Chronic | ICD-10-CM

## 2024-06-19 PROCEDURE — 99211 OFF/OP EST MAY X REQ PHY/QHP: CPT | Mod: PBBFAC,PN | Performed by: DIETITIAN, REGISTERED

## 2024-06-19 PROCEDURE — G0108 DIAB MANAGE TRN  PER INDIV: HCPCS | Mod: PBBFAC,PN | Performed by: DIETITIAN, REGISTERED

## 2024-06-20 NOTE — PROGRESS NOTES
Diabetes Care Specialist Follow-up Note  Author: Kylee Veronica RD  Date: 6/20/2024    Program Intake  Reason for Diabetes Program Visit:: Intervention  Type of Follow-Up: 3 month  Current diabetes risk level:: moderate  Continuous Glucose Monitoring  Patient has CGM: No    Lab Results   Component Value Date    HGBA1C 7.5 (H) 05/24/2024     A1c Pre Diabetes Care Specialist Intervention:  11.5%    Clinical    Weight: 111.4 kg (245 lb 9.6 oz)       Body mass index is 38.47 kg/m².  Wt loss of 3 lbs since last visit on 4/17/24    Patient Health Rating  Compared to other people your age, how would you rate your health?: Good      Clinical Assessment  Current Diabetes Treatment: Oral Medication (Metformin 1000mg BID)  Have you ever experienced hypoglycemia (low blood sugar)?: no  Have you ever experienced hyperglycemia (high blood sugar)?: no      Nutritional Status  Meal Plan 24 Hour Recall - Lunch: small lunch like tuna and fruit  Meal Plan 24 Hour Recall - Dinner: meat or fish (air-fried) with vegetables  Meal Plan 24 Hour Recall - Snack: Snacks on small piece of fruit. Drinks mostly water with sugarfree flavored drops  Dentation:: Wears Dentures  Recent Changes in Weight: No Recent Weight Change  Current nutritional status an area of need that is impacting patient's ability to self-manage diabetes?: No    Physical activity/Exercise:   5 days per week    SMBG: glucometer  Fasting,   Post prandial, n/a    Additional Social History       Diabetes Self-Management Skills Assessment     Physical Activity/Exercise  Patient's daily activity level:: constantly moving  Patient formally exercises outside of work.: yes  Exercise Type: walking (walks 3500 steps before work and walks around park or in neighborhood daily.)  Intensity: Moderate  Frequency: four or more times a week  Duration: 45 min  Patient can identify forms of physical activity.: yes  Stated forms of physical activity:: moving to burn calories, manual  activity at work  Patient can identify reasons why exercise/physical activity is important in diabetes management.: yes  Identified reasons:: lowers blood glucose, blood pressure, and cholesterol  Physical Activity/Exercise Skills Assessment Completed: : Yes  Assessment indicates:: Adequate understanding  Area of need?: No    Medications  Patient is able to describe current diabetes management routine.: yes  Diabetes management routine:: diet, exercise, oral medications  Patient is able to identify current diabetes medications, dosages, and appropriate timing of medications.: yes  Patient understands the purpose of the medications taken for diabetes.: yes  Patient reports problems or concerns with current medication regimen.: no  Medication Skills Assessment Completed:: Yes  Assessment indicates:: Adequate understanding  Area of need?: No    Home Blood Glucose Monitoring  Patient states that blood sugar is checked at home daily.: yes  Monitoring Method:: home glucometer  How often do you check your blood sugar?: Once a day  When do you check your blood sugar?: Before breakfast  When you check what is your typical blood sugar range? :   Blood glucose logs:: encouraged to bring logs to provider visits  Blood glucose logs reviewed today?: yes    Home Blood Glucose Monitoring Skills Assessment Completed: : Yes  Assessment indicates:: Adequate understanding  Area of need?: No                     During today's follow-up visit,  the following areas required further assessment and content was provided/reviewed.    Based on today's diabetes care assessment, the following areas of need were identified:          4/17/2024    12:01 AM   Social   Support No   Access to [employer] No   Cognitive/Behavioral Health No   Communication No   Health Literacy No            6/19/2024    12:01 AM   Clinical   Nutritional Status No            6/19/2024    12:01 AM   Diabetes Self-Management Skills   Physical Activity/Exercise  No   Medication No   Home Blood Glucose Monitoring Yes - see care plan        Today's interventions were provided through individual discussion, instruction, and written materials were provided.    Patient verbalized understanding of instruction and written materials.  Pt was able to return back demonstration of instructions today. Patient understood key points, needs reinforcement and further instruction.     Diabetes Self-Management Care Plan Review and Evaluation of Progress:    During today's follow-up Jesus's Diabetes Self-Management Care Plan progress was reviewed and progress was evaluated including his/her input. Jesus has agreed to continue his/her journey to improve/maintain overall diabetes control by continuing to set health goals. See care plan progress below.      Care Plan: Diabetes Management   Updates made since 5/21/2024 12:00 AM        Problem: Chronic Complications Resolved 6/20/2024        Goal: Practice foot care daily Completed 6/20/2024   Start Date: 4/18/2024   Expected End Date: 6/19/2024   This Visit's Progress: Met   Priority: Medium   Barriers: No Barriers Identified        Problem: Blood Glucose Self-Monitoring         Goal: Patient agrees to check and record blood sugars daily    Start Date: 6/20/2024   Expected End Date: 12/18/2024   Priority: High   Barriers: No Barriers Identified   Note:    6/20/24 - instructed to bring log to provider visits.       Task: Reviewed the importance of self-monitoring blood glucose and keeping logs. Completed 6/20/2024          Follow Up Plan     Follow up in about 6 months (around 12/19/2024) for A1C check.    Today's care plan and follow up schedule was discussed with patient.  Jesus verbalized understanding of the care plan, goals, and agrees to follow up plan.        The patient was encouraged to communicate with his/her health care provider/physician and care team regarding his/her condition(s) and treatment.  I provided the patient with my  contact information today and encouraged to contact me via phone or [facility] Patient Portal as needed.     Length of Visit   Total Time: 30 Minutes

## 2024-07-01 PROBLEM — Z00.00 WELLNESS EXAMINATION: Status: RESOLVED | Noted: 2023-03-21 | Resolved: 2024-07-01

## 2024-08-19 ENCOUNTER — LAB VISIT (OUTPATIENT)
Dept: LAB | Facility: HOSPITAL | Age: 59
End: 2024-08-19
Attending: UROLOGY
Payer: COMMERCIAL

## 2024-08-19 DIAGNOSIS — Z11.4 SCREENING FOR HIV (HUMAN IMMUNODEFICIENCY VIRUS): ICD-10-CM

## 2024-08-19 DIAGNOSIS — R97.20 RISING PSA LEVEL: ICD-10-CM

## 2024-08-19 LAB
HIV 1+2 AB+HIV1 P24 AG SERPL QL IA: NONREACTIVE
PSA SERPL-MCNC: 0.62 NG/ML

## 2024-08-19 PROCEDURE — 84153 ASSAY OF PSA TOTAL: CPT

## 2024-08-19 PROCEDURE — 87389 HIV-1 AG W/HIV-1&-2 AB AG IA: CPT

## 2024-08-19 PROCEDURE — 36415 COLL VENOUS BLD VENIPUNCTURE: CPT

## 2024-08-21 ENCOUNTER — OFFICE VISIT (OUTPATIENT)
Dept: UROLOGY | Facility: CLINIC | Age: 59
End: 2024-08-21
Payer: COMMERCIAL

## 2024-08-21 VITALS
HEART RATE: 71 BPM | DIASTOLIC BLOOD PRESSURE: 77 MMHG | SYSTOLIC BLOOD PRESSURE: 130 MMHG | TEMPERATURE: 98 F | WEIGHT: 254.38 LBS | HEIGHT: 67 IN | RESPIRATION RATE: 20 BRPM | OXYGEN SATURATION: 97 % | BODY MASS INDEX: 39.92 KG/M2

## 2024-08-21 DIAGNOSIS — R97.20 RISING PSA LEVEL: Primary | ICD-10-CM

## 2024-08-21 LAB
BILIRUB SERPL-MCNC: NEGATIVE MG/DL
BLOOD URINE, POC: NEGATIVE
COLOR, POC UA: YELLOW
GLUCOSE UR QL STRIP: NEGATIVE
KETONES UR QL STRIP: NORMAL
LEUKOCYTE ESTERASE URINE, POC: NEGATIVE
NITRITE, POC UA: NEGATIVE
PH, POC UA: 5.5
PROTEIN, POC: NORMAL
SPECIFIC GRAVITY, POC UA: 1.03
UROBILINOGEN, POC UA: 1

## 2024-08-21 PROCEDURE — 99215 OFFICE O/P EST HI 40 MIN: CPT | Mod: PBBFAC | Performed by: UROLOGY

## 2024-08-21 PROCEDURE — 3060F POS MICROALBUMINURIA REV: CPT | Mod: CPTII,,, | Performed by: UROLOGY

## 2024-08-21 PROCEDURE — 1160F RVW MEDS BY RX/DR IN RCRD: CPT | Mod: CPTII,,, | Performed by: UROLOGY

## 2024-08-21 PROCEDURE — 3078F DIAST BP <80 MM HG: CPT | Mod: CPTII,,, | Performed by: UROLOGY

## 2024-08-21 PROCEDURE — 3051F HG A1C>EQUAL 7.0%<8.0%: CPT | Mod: CPTII,,, | Performed by: UROLOGY

## 2024-08-21 PROCEDURE — 4010F ACE/ARB THERAPY RXD/TAKEN: CPT | Mod: CPTII,,, | Performed by: UROLOGY

## 2024-08-21 PROCEDURE — 99213 OFFICE O/P EST LOW 20 MIN: CPT | Mod: S$PBB,,, | Performed by: UROLOGY

## 2024-08-21 PROCEDURE — 3008F BODY MASS INDEX DOCD: CPT | Mod: CPTII,,, | Performed by: UROLOGY

## 2024-08-21 PROCEDURE — 3075F SYST BP GE 130 - 139MM HG: CPT | Mod: CPTII,,, | Performed by: UROLOGY

## 2024-08-21 PROCEDURE — 81001 URINALYSIS AUTO W/SCOPE: CPT | Mod: PBBFAC | Performed by: UROLOGY

## 2024-08-21 PROCEDURE — 3066F NEPHROPATHY DOC TX: CPT | Mod: CPTII,,, | Performed by: UROLOGY

## 2024-08-21 PROCEDURE — 1159F MED LIST DOCD IN RCRD: CPT | Mod: CPTII,,, | Performed by: UROLOGY

## 2024-08-21 NOTE — PROGRESS NOTES
Patient seen by Dr. STEVE Lugo. Will return in 6 months with psa. Written and verbal discharge instructions given.

## 2024-08-21 NOTE — PROGRESS NOTES
CC:  History of elevated    HPI:  Jesus Martinez is a 59 y.o. male seen for follow-up of history of elevated PSA.  His PSA has been running less than one and then in September 2022 it makenzie to 1.91.  The following year in December of 2023 it did come back down 2.6 eight.  We have been following it and it has been less than one since then.  He has no urinary complaints.  I did a circumcision in February of 2024 and he is has no complaints related to this.    Urinalysis:  Results for orders placed or performed in visit on 08/21/24   POCT URINE DIPSTICK WITH MICROSCOPE, AUTOMATED   Result Value Ref Range    Color, UA Yellow     Spec Grav UA 1.030     pH, UA 5.5     WBC, UA Negative     Nitrite, UA Negative     Protein, POC Trace     Glucose, UA Negative     Ketones, UA Trace     Urobilinogen, UA 1.0     Bilirubin, POC Negative     Blood, UA Negative      Microscopic Urinalysis:  WBC:   None per HPF     RBC:    None per HPF     Bacteria:    None per HPF     Squamous epithelial cells:  None per HPF      Crystals:   None    Lab Results:  PSA History:    10/24/17 06:34 11/20/18 06:20 11/16/20 06:18 09/17/21 06:33 09/19/22 06:27 12/11/23 10:27 03/25/24 06:13 08/19/24 06:12   0.9 0.8 0.57 0.58 1.91 0.68 0.53 0.62     ROS:  All systems reviewed and are negative except as documented in HPI and/or Assessment and Plan.     Patient Active Problem List:     Patient Active Problem List   Diagnosis    Mild CAD    Erectile dysfunction    Primary hypertension    Microscopic hematuria    Mixed hyperlipidemia    Obesity (BMI 30-39.9)    Plantar fasciitis    Vitamin D deficiency    Type 2 diabetes mellitus with hyperglycemia, without long-term current use of insulin    SANCHEZ (dyspnea on exertion)    Bilateral lower extremity pain    Peripheral edema    Deficient foreskin    Balanitis    Phimosis        Past Medical History:  Past Medical History:   Diagnosis Date    Hypertension     Mixed hyperlipidemia         Past Surgical  History:  Past Surgical History:   Procedure Laterality Date    CARDIAC CATHETERIZATION      2018    CIRCUMCISION N/A 2024    Procedure: CIRCUMCISION;  Surgeon: Vero Lugo DO;  Location: [facility];  Service: Urology;  Laterality: N/A;    CIRCUMCISION          Family History:  Family History   Problem Relation Name Age of Onset    Heart disease Mother      Heart disease Father          Social History:  Social History     Socioeconomic History    Marital status:    Tobacco Use    Smoking status: Former     Current packs/day: 0.00     Average packs/day: 0.3 packs/day for 7.0 years (1.8 ttl pk-yrs)     Types: Cigarettes     Start date:      Quit date:      Years since quittin.6    Smokeless tobacco: Never   Substance and Sexual Activity    Alcohol use: Not Currently    Drug use: Never    Sexual activity: Yes     Social Determinants of Health     Financial Resource Strain: Medium Risk (2024)    Overall Financial Resource Strain (CARDIA)     Difficulty of Paying Living Expenses: Somewhat hard   Food Insecurity: Food Insecurity Present (2024)    Hunger Vital Sign     Worried About Running Out of Food in the Last Year: Sometimes true     Ran Out of Food in the Last Year: Sometimes true   Transportation Needs: No Transportation Needs (2024)    PRAPARE - Transportation     Lack of Transportation (Medical): No     Lack of Transportation (Non-Medical): No   Physical Activity: Insufficiently Active (2024)    Exercise Vital Sign     Days of Exercise per Week: 3 days     Minutes of Exercise per Session: 10 min   Stress: No Stress Concern Present (2024)    [employer] [place] [employer] - Occupational Stress Questionnaire     Feeling of Stress : Not at all   Housing Stability: Low Risk  (2024)    Housing Stability Vital Sign     Unable to Pay for Housing in the Last Year: No     Number of Places Lived in the Last Year: 0     Unstable Housing in the Last Year: No         Allergies:  Review of patient's allergies indicates:  No Known Allergies     Objective:  Vitals:    08/21/24 1402   BP: 130/77   Pulse: 71   Resp: 20   Temp: 98.1 °F (36.7 °C)     General:  Well developed, well nourished adult male in no acute distress  Abdomen: Soft, nontender, no masses  Extremities:  No clubbing, cyanosis, or edema  Neurologic:  Grossly intact  Musculoskeletal:  Normal tone  Penis:  Circumcised, no lesions  Scrotum:  No hydrocele  Testicles:  Nontender, no masses  Epididymis:  Normal without masses  Prostate exam:  Nontender, symmetrical, no nodules  Rectal:  Normal rectal tone    Last BLANQUITA:  December 2023    Assessment:  1. Rising PSA level  - POCT URINE DIPSTICK WITH MICROSCOPE, AUTOMATED  - PSA, Total (Diagnostic); Future     Plan:  The PSA has remained less than one.  He would prefer to continue to check this at six month intervals at this time.    Follow-up:  PSA in six months and follow-up after for BLANQUITA.

## 2024-08-29 ENCOUNTER — CLINICAL SUPPORT (OUTPATIENT)
Dept: INTERNAL MEDICINE | Facility: CLINIC | Age: 59
End: 2024-08-29
Attending: NURSE PRACTITIONER
Payer: COMMERCIAL

## 2024-08-29 ENCOUNTER — OFFICE VISIT (OUTPATIENT)
Dept: INTERNAL MEDICINE | Facility: CLINIC | Age: 59
End: 2024-08-29
Payer: COMMERCIAL

## 2024-08-29 VITALS
RESPIRATION RATE: 16 BRPM | SYSTOLIC BLOOD PRESSURE: 125 MMHG | DIASTOLIC BLOOD PRESSURE: 81 MMHG | TEMPERATURE: 98 F | BODY MASS INDEX: 39.71 KG/M2 | HEART RATE: 73 BPM | OXYGEN SATURATION: 98 % | WEIGHT: 253 LBS | HEIGHT: 67 IN

## 2024-08-29 DIAGNOSIS — E78.2 MIXED HYPERLIPIDEMIA: ICD-10-CM

## 2024-08-29 DIAGNOSIS — E11.65 TYPE 2 DIABETES MELLITUS WITH HYPERGLYCEMIA, WITHOUT LONG-TERM CURRENT USE OF INSULIN: ICD-10-CM

## 2024-08-29 DIAGNOSIS — E11.9 TYPE 2 DIABETES MELLITUS WITHOUT COMPLICATION, WITHOUT LONG-TERM CURRENT USE OF INSULIN: ICD-10-CM

## 2024-08-29 DIAGNOSIS — N52.9 ERECTILE DYSFUNCTION, UNSPECIFIED ERECTILE DYSFUNCTION TYPE: ICD-10-CM

## 2024-08-29 DIAGNOSIS — Z00.00 WELLNESS EXAMINATION: ICD-10-CM

## 2024-08-29 DIAGNOSIS — Z12.5 PROSTATE CANCER SCREENING: ICD-10-CM

## 2024-08-29 DIAGNOSIS — I10 PRIMARY HYPERTENSION: ICD-10-CM

## 2024-08-29 LAB — HBA1C MFR BLD: NORMAL %

## 2024-08-29 PROCEDURE — 92228 IMG RTA DETC/MNTR DS PHY/QHP: CPT | Mod: TC,PBBFAC | Performed by: FAMILY MEDICINE

## 2024-08-29 PROCEDURE — 83036 HEMOGLOBIN GLYCOSYLATED A1C: CPT | Mod: PBBFAC | Performed by: NURSE PRACTITIONER

## 2024-08-29 PROCEDURE — 99214 OFFICE O/P EST MOD 30 MIN: CPT | Mod: PBBFAC | Performed by: NURSE PRACTITIONER

## 2024-08-29 RX ORDER — ROSUVASTATIN CALCIUM 40 MG/1
40 TABLET, COATED ORAL NIGHTLY
Qty: 90 TABLET | Refills: 2 | Status: SHIPPED | OUTPATIENT
Start: 2024-08-29 | End: 2025-05-26

## 2024-08-29 RX ORDER — METFORMIN HYDROCHLORIDE 1000 MG/1
1000 TABLET ORAL 2 TIMES DAILY WITH MEALS
Qty: 180 TABLET | Refills: 2 | Status: SHIPPED | OUTPATIENT
Start: 2024-08-29 | End: 2025-05-26

## 2024-08-29 RX ORDER — EZETIMIBE 10 MG/1
10 TABLET ORAL NIGHTLY
Qty: 90 TABLET | Refills: 2 | Status: SHIPPED | OUTPATIENT
Start: 2024-08-29 | End: 2025-05-26

## 2024-08-29 RX ORDER — TADALAFIL 20 MG/1
20 TABLET ORAL DAILY PRN
Qty: 10 TABLET | Refills: 12 | Status: SHIPPED | OUTPATIENT
Start: 2024-08-29

## 2024-08-29 RX ORDER — LISINOPRIL AND HYDROCHLOROTHIAZIDE 12.5; 2 MG/1; MG/1
1 TABLET ORAL DAILY
Qty: 90 TABLET | Refills: 2 | Status: SHIPPED | OUTPATIENT
Start: 2024-08-29 | End: 2025-05-26

## 2024-08-29 RX ORDER — LISINOPRIL 20 MG/1
20 TABLET ORAL NIGHTLY
Qty: 90 TABLET | Refills: 2 | Status: SHIPPED | OUTPATIENT
Start: 2024-08-29 | End: 2025-05-26

## 2024-08-29 NOTE — ASSESSMENT & PLAN NOTE
A1C at Goal  Continue RX metformin 1000 mg BID   F/U for wellness in 6 months  Continue to monitor AM fasting glucose  Follow ADA diet.  Avoid soda, simple sweets, and limit rice/pasta/bread/starches and consume brown options when possible.   Maintain healthy weight with BMI goal <30.   Perform aerobic exercise for 150 minutes per week (or 5 days a week for 30 minutes each day).   Examine feet daily.

## 2024-08-29 NOTE — PROGRESS NOTES
Jesus Martinez is a 59 y.o. male here for a diabetic eye screening with non-dilated fundus photos per [facility] Stevenson.    Patient cooperative?: Yes  Small pupils?:YES  Last eye exam: NA    For exam results, see Encounter Report.

## 2024-08-29 NOTE — PROGRESS NOTES
FANG Harkins   [facility]  [address] [address]      PATIENT NAME: Jesus Martinez  : 1965  DATE: 24  MRN: [id number]      Reason for Visit / Chief Complaint: Follow-up and Diabetes       History of Present Illness / Problem Focused Workflow     Jesus Martinez presents to the clinic with Follow-up and Diabetes      60 yo AAM here today for f/u  Medical problems includes mild CAD, HLD, HTN, T2DM, Vitamin D deficiency, right knee pain, and ED. Former smoker.  <10 pack/yr history of smoking. Followed by [facility] and [facility]. Was referred to PT by [facility] - does not want to use.     Prostate Cancer Screening - 24 PSA 0.53  Colon Cancer Screening -  23 Cologuard Negative   Osteoporosis Screening - 24  Vitamin D level 40.5  HCV Screenin/15/22 Negative  Wellness Visit: 24  Pt here today for f/u for T2DM with labs completed; A1C decreased from 11.5 to 7.5 since our LOV. The pt reports compliance with metformin 500 mg BID. Will increase today to 1000 mg BID and f/u again in about 3 months with labs. Pt denies any acute issues or concerns today.     24  Pt here for 3 month follow up. A1C at goal at 5.8, denies issues and reports overall feels well. Reports he has not seen cardiology since , provided phone number to [facility] cardiology clinic and instructed him to call and schedule an apt. DM eye exam ordered to be completed today. No acute issues, will follow up in 6 months for wellness with labs.           Review of Systems     Review of Systems   Constitutional: Negative.    HENT: Negative.     Eyes: Negative.    Respiratory: Negative.     Cardiovascular: Negative.    Gastrointestinal: Negative.    Endocrine: Negative.    Genitourinary: Negative.    Neurological: Negative.    Psychiatric/Behavioral: Negative.           Medications and Allergies     Medications  Medication List with  Changes/Refills   Current Medications    CLOTRIMAZOLE (LOTRIMIN) 1 % CREAM    Apply topically 2 (two) times daily. for 10 days    EASY TOUCH TWIST LANCETS 33 GAUGE MISC    USE AS DIRECTED TO test TWICE DAILY    MENTHOL 3.5 % GEL    Apply topically.    TRUE METRIX GLUCOSE METER MISC    USE TO test TWICE DAILY   Changed and/or Refilled Medications    Modified Medication Previous Medication    BLOOD SUGAR DIAGNOSTIC STRP blood sugar diagnostic Strp       To check BG 2 times daily, to use with insurance preferred meter E11.9    To check BG 2 times daily, to use with insurance preferred meter E11.9    EZETIMIBE (ZETIA) 10 MG TABLET ezetimibe (ZETIA) 10 mg tablet       Take 1 tablet (10 mg total) by mouth every evening. For High Cholesterol    Take 1 tablet (10 mg total) by mouth every evening. For High Cholesterol    LISINOPRIL (PRINIVIL,ZESTRIL) 20 MG TABLET lisinopriL (PRINIVIL,ZESTRIL) 20 MG tablet       Take 1 tablet (20 mg total) by mouth every evening. For High Blood Pressure    Take 1 tablet (20 mg total) by mouth every evening. For High Blood Pressure    LISINOPRIL-HYDROCHLOROTHIAZIDE (PRINZIDE,ZESTORETIC) 20-12.5 MG PER TABLET lisinopriL-hydrochlorothiazide (PRINZIDE,ZESTORETIC) 20-12.5 mg per tablet       Take 1 tablet by mouth once daily. For High Blood Pressure    Take 1 tablet by mouth once daily. For High Blood Pressure    METFORMIN (GLUCOPHAGE) 1000 MG TABLET metFORMIN (GLUCOPHAGE) 1000 MG tablet       Take 1 tablet (1,000 mg total) by mouth 2 (two) times daily with meals. For Diabetes    Take 1 tablet (1,000 mg total) by mouth 2 (two) times daily with meals. For Diabetes    ROSUVASTATIN (CRESTOR) 40 MG TAB rosuvastatin (CRESTOR) 40 MG Tab       Take 1 tablet (40 mg total) by mouth every evening. For High Cholesterol    Take 1 tablet (40 mg total) by mouth every evening. For High Cholesterol    TADALAFIL (CIALIS) 20 MG TAB tadalafiL (CIALIS) 20 MG Tab       Take 1 tablet (20 mg total) by mouth daily as  needed (Erectile Dysfunction).    Take 1 tablet (20 mg total) by mouth daily as needed (Erectile Dysfunction).         Allergies  Review of patient's allergies indicates:  No Known Allergies    Physical Examination     Vitals:    08/29/24 1351   BP: 125/81   Pulse: 73   Resp: 16   Temp: 98 °F (36.7 °C)     Physical Exam  Vitals reviewed.   Constitutional:       Appearance: Normal appearance. He is normal weight.   HENT:      Head: Normocephalic.   Cardiovascular:      Rate and Rhythm: Normal rate and regular rhythm.      Pulses: Normal pulses.      Heart sounds: Normal heart sounds.   Pulmonary:      Effort: Pulmonary effort is normal.      Breath sounds: Normal breath sounds.   Abdominal:      General: Abdomen is flat.      Palpations: Abdomen is soft.   Musculoskeletal:         General: Normal range of motion.      Cervical back: Normal range of motion.   Skin:     General: Skin is warm and dry.   Neurological:      Mental Status: He is alert.   Psychiatric:         Mood and Affect: Mood normal.           Results     Lab Results   Component Value Date    WBC 5.34 03/25/2024    RBC 4.81 03/25/2024    HGB 13.2 (L) 03/25/2024    HCT 41.7 (L) 03/25/2024    MCV 86.7 03/25/2024    MCH 27.4 03/25/2024    MCHC 31.7 (L) 03/25/2024    RDW 12.3 03/25/2024     03/25/2024    MPV 9.1 03/25/2024     Sodium   Date Value Ref Range Status   05/24/2024 139 136 - 145 mmol/L Final     Potassium   Date Value Ref Range Status   05/24/2024 3.6 3.5 - 5.1 mmol/L Final     Chloride   Date Value Ref Range Status   05/24/2024 105 98 - 107 mmol/L Final     CO2   Date Value Ref Range Status   05/24/2024 26 23 - 31 mmol/L Final     Blood Urea Nitrogen   Date Value Ref Range Status   05/24/2024 16.0 8.4 - 25.7 mg/dL Final     Creatinine   Date Value Ref Range Status   05/24/2024 0.85 0.73 - 1.18 mg/dL Final     Calcium   Date Value Ref Range Status   05/24/2024 9.6 8.8 - 10.0 mg/dL Final     Albumin   Date Value Ref Range Status    03/25/2024 3.5 3.4 - 4.8 g/dL Final     Bilirubin Total   Date Value Ref Range Status   03/25/2024 0.4 <=1.5 mg/dL Final     ALP   Date Value Ref Range Status   03/25/2024 35 (L) 40 - 150 unit/L Final     AST   Date Value Ref Range Status   03/25/2024 15 5 - 34 unit/L Final     ALT   Date Value Ref Range Status   03/25/2024 17 0 - 55 unit/L Final     Estimated GFR-Non    Date Value Ref Range Status   03/10/2022 >105 >=90      Lab Results   Component Value Date    CHOL 132 03/25/2024     Lab Results   Component Value Date    HDL 36 03/25/2024     Lab Results   Component Value Date    TRIG 64 03/25/2024     Lab Results   Component Value Date    VLDL 13 03/25/2024     Lab Results   Component Value Date    LDL 83.00 03/25/2024     Lab Results   Component Value Date    TSH 0.923 03/25/2024     Lab Results   Component Value Date    PHUR 5.5 08/21/2024    SPECGRAV 1.030 08/21/2024    PROTEINUA 1+ (A) 05/24/2024    GLUCUA Normal 05/24/2024    KETONESU Trace 08/21/2024    OCCULTUA Negative 05/24/2024    NITRITE Negative 08/21/2024    LEUKOCYTESUR Negative 05/24/2024     Lab Results   Component Value Date    HGBA1C 7.5 (H) 05/24/2024    HGBA1C 11.5 (H) 03/25/2024    HGBA1C 7.6 (H) 09/18/2023           Assessment         ICD-10-CM ICD-9-CM   1. Type 2 diabetes mellitus with hyperglycemia, without long-term current use of insulin  E11.65 250.00     790.29   2. Wellness examination  Z00.00 V70.0   3. Prostate cancer screening  Z12.5 V76.44   4. Type 2 diabetes mellitus without complication, without long-term current use of insulin  E11.9 250.00   5. Mixed hyperlipidemia  E78.2 272.2   6. Primary hypertension  I10 401.9   7. Erectile dysfunction, unspecified erectile dysfunction type  N52.9 607.84       Plan      Problem List Items Addressed This Visit          Cardiac/Vascular    Primary hypertension    Relevant Medications    lisinopriL (PRINIVIL,ZESTRIL) 20 MG tablet    lisinopriL-hydrochlorothiazide  (PRINZIDE,ZESTORETIC) 20-12.5 mg per tablet    Mixed hyperlipidemia    Relevant Medications    ezetimibe (ZETIA) 10 mg tablet    rosuvastatin (CRESTOR) 40 MG Tab       Renal/    Erectile dysfunction    Relevant Medications    tadalafiL (CIALIS) 20 MG Tab       Endocrine    Type 2 diabetes mellitus with hyperglycemia, without long-term current use of insulin (Chronic)    Current Assessment & Plan     A1C at Goal  Continue RX metformin 1000 mg BID   F/U for wellness in 6 months  Continue to monitor AM fasting glucose  Follow ADA diet.  Avoid soda, simple sweets, and limit rice/pasta/bread/starches and consume brown options when possible.   Maintain healthy weight with BMI goal <30.   Perform aerobic exercise for 150 minutes per week (or 5 days a week for 30 minutes each day).   Examine feet daily.                Relevant Medications    blood sugar diagnostic Strp    metFORMIN (GLUCOPHAGE) 1000 MG tablet    Other Relevant Orders    Diabetic Eye Screening Photo    Hemoglobin A1C    Microalbumin/Creatinine Ratio, Urine    POCT HEMOGLOBIN A1C (Completed)     Other Visit Diagnoses       Wellness examination        Relevant Orders    TSH    Vitamin D    Comprehensive Metabolic Panel    Urinalysis, Reflex to Urine Culture    Lipid Panel    CBC Auto Differential    Prostate cancer screening        Relevant Orders    PSA, Screening    Type 2 diabetes mellitus without complication, without long-term current use of insulin        Relevant Medications    blood sugar diagnostic Strp    metFORMIN (GLUCOPHAGE) 1000 MG tablet            Future Appointments   Date Time Provider Department Center   12/16/2024  1:00 PM Kylee Veronica RD [facility]   2/21/2025  2:00 PM Vero Lugo DO [facility]   3/31/2025  1:20 PM Annamarie Galicia FNP [facility]            Signature:     [facility]  [address] [address]    Date  of encounter: 8/29/24

## 2024-10-28 ENCOUNTER — PATIENT OUTREACH (OUTPATIENT)
Facility: CLINIC | Age: 59
End: 2024-10-28
Payer: COMMERCIAL

## 2024-12-16 ENCOUNTER — CLINICAL SUPPORT (OUTPATIENT)
Dept: DIABETES | Facility: CLINIC | Age: 59
End: 2024-12-16
Payer: COMMERCIAL

## 2024-12-16 DIAGNOSIS — E11.65 TYPE 2 DIABETES MELLITUS WITH HYPERGLYCEMIA, WITHOUT LONG-TERM CURRENT USE OF INSULIN: Primary | ICD-10-CM

## 2024-12-16 PROCEDURE — G0108 DIAB MANAGE TRN  PER INDIV: HCPCS | Mod: PBBFAC,PN | Performed by: DIETITIAN, REGISTERED

## 2024-12-16 NOTE — LETTER
December 17, 2024      Annamarie Galicia, [facility]  [address] [id number]         Patient: JERALD Martinez   MR Number: [id number]   YOB: 1965   Date of Visit: 12/16/2024       Dear Ms Galicia:    Thank you for referring JERALD for diabetes self-management education and support services. He has completed all components of our Diabetes Management Program. Below is a summary of his clinical outcomes and goal progress.    Patient Outcomes:    A1c Status:   Lab Results   Component Value Date    HGBA1C  HGBA1C 5.8  7.5 (H) 08/29/2024 05/24/2024    HGBA1C 11.5 (H) 03/25/2024       Care Plan: Diabetes Management   Updates made since 12/18/2023 12:00 AM        Problem: Physical Activity and Exercise Resolved 4/18/2024        Goal: Patient agrees to increase physical activity to a goal of 3-4 times per week for 1.5 miles. Completed 4/18/2024   Start Date: 9/29/2022   This Visit's Progress: Met   Priority: High   Barriers: No Barriers Identified   Note:    9/29/22 - Pt works in [employer] and [place] to a park. Pt stated goal is to take a .5 mile walk for his lunch break. He intends to also walk 1 mile after work.    4/18/24 - Pt has been walking daily at work several times per day. States his knees feel better since he lost weight.       Problem: Chronic Complications Resolved 6/20/2024        Goal: Practice foot care daily Completed 6/20/2024   Start Date: 4/18/2024   Expected End Date: 6/19/2024   This Visit's Progress: Met   Priority: Medium   Barriers: No Barriers Identified        Task: Discussed current A1c, Blood Pressure, and Cholesterol results (ABCs of Diabetes) and reviewed targets of each. Completed 4/18/2024        Task: Discussed importance of annual microalbumin urine test to monitor kidney function. Completed 4/18/2024        Task: Discussed importance of annual dilated eye exam for prevention of retinopathy. Completed 4/18/2024        Task: Instructed on basic  daily foot care and encouraged inspecting feet daily. Completed 4/18/2024        Problem: Blood Glucose Self-Monitoring Resolved 12/17/2024        Goal: Patient agrees to check and record blood sugars daily Completed 12/17/2024   Start Date: 6/20/2024   Expected End Date: 12/18/2024   This Visit's Progress: Met   Priority: High   Barriers: No Barriers Identified   Note:    6/20/24 - instructed to bring log to provider visits.       Task: Reviewed the importance of self-monitoring blood glucose and keeping logs. Completed 6/20/2024          Follow up:   JERALD to attend medical appointments as scheduled  JERALD to update you on his DM education progress as needed      If you have questions, please do not hesitate to call me. I look forward to providing additional education and support as needed.    Sincerely,    Kylee Veronica RD  Diabetes Care and

## 2024-12-17 NOTE — PROGRESS NOTES
Diabetes Care Specialist Follow-up Note  Author: Kylee Veronica RD  Date: 12/17/2024    Intake    Program Intake  Reason for Diabetes Program Visit:: Intervention  Type of Intervention:: Individual  Type of Follow-Up: 6 month         Continuous Glucose Monitoring  Patient has CGM: No    Lab Results   Component Value Date    HGBA1C 7.5 (H)  5.8 05/24/2024 08/29/2024     A1c Pre Diabetes Care Specialist Intervention:  11.5%          There is no height or weight on file to calculate BMI.  Wt gain of 13 lbs since last visit on 4/17/24      Physical activity/Exercise:   daily    SMBG: glucometer  Fasting,   Post prandial, 130s    Lifestyle Coping Support & Clinical         Diabetes Self-Management Skills Assessment    Medication Skills Assessment  Patient is able to identify current diabetes medications, dosages, and appropriate timing of medications.: yes  Patient reports problems or concerns with current medication regimen.: no  Medication Skills Assessment Completed:: Yes  Assessment indicates:: Adequate understanding  Area of need?: No          Home Blood Glucose Monitoring  Patient states that blood sugar is checked at home daily.: yes  Monitoring Method:: home glucometer  Fasting BG range history::   Home Blood Glucose Monitoring Skills Assessment Completed: : Yes  Assessment indicates:: Instruction Needed  Area of need?: Yes                During today's follow-up visit,  the following areas required further assessment and content was provided/reviewed.    Based on today's diabetes care assessment, the following areas of need were identified:      Identified Areas of Need      Medication/Current Diabetes Treatment: No   Lifestyle Coping/Support:     Diabetes Disease Process/Treatment Options:     Nutrition/Healthy Eating:      Physical Activity/Exercise:      Home Blood Glucose Monitoring: Yes - continue to monitor and bring to provider visits.   Acute Complications:      Chronic Complications:          Today's interventions were provided through individual discussion, instruction, and written materials were provided.    Patient verbalized understanding of instruction and written materials.  Pt was able to return back demonstration of instructions today. Patient understood key points, needs reinforcement and further instruction.     Diabetes Self-Management Care Plan Review and Evaluation of Progress:    During today's follow-up Jesus's Diabetes Self-Management Care Plan progress was reviewed and progress was evaluated including his/her input. Jesus has agreed to continue his/her journey to improve/maintain overall diabetes control by continuing to set health goals. See care plan progress below.      Care Plan: Diabetes Management   Updates made since 12/18/2023 12:00 AM        Problem: Physical Activity and Exercise Resolved 4/18/2024        Goal: Patient agrees to increase physical activity to a goal of 3-4 times per week for 1.5 miles. Completed 4/18/2024   Start Date: 9/29/2022   This Visit's Progress: Met   Priority: High   Barriers: No Barriers Identified   Note:    9/29/22 - Pt works in [employer] and lives next to a park. Pt stated goal is to take a .5 mile walk for his lunch break. He intends to also walk 1 mile after work.    4/18/24 - Pt has been walking daily at work several times per day. States his knees feel better since he lost weight.       Problem: Chronic Complications Resolved 6/20/2024        Goal: Practice foot care daily Completed 6/20/2024   Start Date: 4/18/2024   Expected End Date: 6/19/2024   This Visit's Progress: Met   Priority: Medium   Barriers: No Barriers Identified        Task: Discussed current A1c, Blood Pressure, and Cholesterol results (ABCs of Diabetes) and reviewed targets of each. Completed 4/18/2024        Task: Discussed importance of annual microalbumin urine test to monitor kidney function. Completed 4/18/2024        Task: Discussed importance of  annual dilated eye exam for prevention of retinopathy. Completed 4/18/2024        Task: Instructed on basic daily foot care and encouraged inspecting feet daily. Completed 4/18/2024        Problem: Blood Glucose Self-Monitoring Resolved 12/17/2024        Goal: Patient agrees to check and record blood sugars daily Completed 12/17/2024   Start Date: 6/20/2024   Expected End Date: 12/18/2024   This Visit's Progress: Met   Priority: High   Barriers: No Barriers Identified   Note:    6/20/24 - instructed to bring log to provider visits.       Task: Reviewed the importance of self-monitoring blood glucose and keeping logs. Completed 6/20/2024          Follow Up Plan     Follow up if symptoms worsen or fail to improve.    Today's care plan and follow up schedule was discussed with patient.  Jesus verbalized understanding of the care plan, goals, and agrees to follow up plan.        The patient was encouraged to communicate with his/her health care provider/physician and care team regarding his/her condition(s) and treatment.  I provided the patient with my contact information today and encouraged to contact me via phone or [facility]'s Patient Portal as needed.     Length of Visit   Total Time: 30 Minutes

## 2025-01-28 ENCOUNTER — PATIENT OUTREACH (OUTPATIENT)
Facility: CLINIC | Age: 60
End: 2025-01-28
Payer: COMMERCIAL

## 2025-01-28 NOTE — PROGRESS NOTES
Health Maintenance Topic(s) Outreach Outcomes & Actions Taken:    Lab(s) - Outreach Outcomes & Actions Taken  : Overdue Lab(s) Ordered, Primary Care Follow Up Visit Scheduled , and ordered at last visit. Date range extended to NV. Letter mailed.    Eye Exam - Outreach Outcomes & Actions Taken  : Eye Exam Screening Order Placed and per pcp at last visit. Appt note added.    Diabetic Foot Exam - Outreach Outcomes & Actions Taken  : Appt note added    Medication Adherence / Statins - Outreach Outcomes & Actions Taken  : HTN, DM, & Statin med compliant     Additional Notes:  Attempt made to contact patient. No answer. Message left with name and phone number for callback. Letter mailed.    Next PCP F/U: 3/31/2025  Health Maintenance Topics Overdue:  VBHM Score: 2   Hemoglobin A1c- ordered at . Letter mailed reminding to complete prior to NV.  Foot Exam- appt note added    Eye exam due 9/21/2024. Ordered 8/29/2024. Appt note added.    HTN: controlled. Med compliance per [facility]DH.      DM: uncontrolled. Med compliance per PMDH.    Annual Kidney Eval: uACR & eGFR utd. Ordered 8/29/2024.    Statin Therapy for prevention of CVD: Med compliance per PMDH.    Enrolled in DM Education.  12/16/2024    Pt may benefit from OPCM/ CHW referral for SDOH.     Care Management, Digital Medicine, and/or Education Referrals      Next Steps - Referral Actions: Digital Medicine Outcomes and Actions Taken: Pt Declined or Not Eligible

## 2025-01-28 NOTE — LETTER
Dear Michael [facility] is committed to your overall health. We have checked the health information in your chart to make sure you are up to date.    You have an appointment with Annamarie Galicia FNP on 3/31/2025 at 1:20 pm. Please remember to complete your fasting labs before this appointment. If you have any questions, you may call or ask them at your appointment.    If you would like, I can help get these items ordered for you (if needed) and also see if there is anything else we can do to help you. Please reply to this message in your patient portal or give me a call at [phone].          MYLENE Lino Care Coordinator  Annamarie Galicia FNP and your [facility] Primary Care Team

## 2025-01-28 NOTE — LETTER
Dear Jesus Velasquez, my name is Zoie, I am a nurse care coordinator for Yessy Ledbetter FNP. I am reaching out to you regarding your answers to the Social Determinants of Health questions.     You answered that it is somewhat hard for you to pay for the very basics like food, housing, medical care, and/or heating.    You answered no that the lack of transportation has kept you from important things like medical appointments or getting your medications.     You answered that in the past 12 months, you sometimes worry that you will run out of food before you have money to buy more.    You answered that in the past 12 months, you sometimes run out of food and did not have money to buy more.    Based on these answers, it sounds like could benefit from our outpatient case management program where nurses and social workers work with you to help you get access to resources that can help you. There is no cost to you. [facility] provides this team to help your doctor/provider provide the best care to you.     If you are interested, and would like a referral, please reply to this message in your patient portal or give me a call at [phone]. I look forward to speaking with you.     This is a friendly reminder that you have an appointment scheduled with Annamarie Galicia FNP on 3/31/2025. If you have any questions, you may discuss the(se) at that time if you choose to do so.       Sincerely,    MYLENE Lino Care Coordinator  Annamarie Galicia FNP and your [facility] Primary [facility] Team

## 2025-02-05 ENCOUNTER — TELEPHONE (OUTPATIENT)
Dept: INTERNAL MEDICINE | Facility: CLINIC | Age: 60
End: 2025-02-05
Payer: COMMERCIAL

## 2025-02-05 NOTE — TELEPHONE ENCOUNTER
Referral noted. Spoke to patient and mailing resources for food and financial support and my contact information. Advised him to contact me with any question, concerns or new needs at any time. He voiced understanding and appreciation  Will follow up 2 weeks

## 2025-02-19 ENCOUNTER — TELEPHONE (OUTPATIENT)
Dept: INTERNAL MEDICINE | Facility: CLINIC | Age: 60
End: 2025-02-19
Payer: COMMERCIAL

## 2025-02-19 NOTE — TELEPHONE ENCOUNTER
Spoke to patient and he did have success with the resources I sent him. Advised him to contact should any further needs arise. Referral closed at this time

## 2025-02-21 ENCOUNTER — OFFICE VISIT (OUTPATIENT)
Dept: UROLOGY | Facility: CLINIC | Age: 60
End: 2025-02-21
Payer: COMMERCIAL

## 2025-02-21 ENCOUNTER — LAB VISIT (OUTPATIENT)
Dept: LAB | Facility: HOSPITAL | Age: 60
End: 2025-02-21
Attending: UROLOGY
Payer: COMMERCIAL

## 2025-02-21 VITALS
SYSTOLIC BLOOD PRESSURE: 134 MMHG | WEIGHT: 267 LBS | OXYGEN SATURATION: 98 % | HEIGHT: 67 IN | HEART RATE: 73 BPM | TEMPERATURE: 98 F | BODY MASS INDEX: 41.91 KG/M2 | DIASTOLIC BLOOD PRESSURE: 76 MMHG

## 2025-02-21 DIAGNOSIS — R97.20 RISING PSA LEVEL: Primary | ICD-10-CM

## 2025-02-21 DIAGNOSIS — Z12.5 PROSTATE CANCER SCREENING: ICD-10-CM

## 2025-02-21 DIAGNOSIS — Z00.00 WELLNESS EXAMINATION: ICD-10-CM

## 2025-02-21 DIAGNOSIS — R97.20 RISING PSA LEVEL: ICD-10-CM

## 2025-02-21 DIAGNOSIS — E11.65 TYPE 2 DIABETES MELLITUS WITH HYPERGLYCEMIA, WITHOUT LONG-TERM CURRENT USE OF INSULIN: ICD-10-CM

## 2025-02-21 LAB
25(OH)D3+25(OH)D2 SERPL-MCNC: 44 NG/ML (ref 30–80)
ALBUMIN SERPL-MCNC: 3.8 G/DL (ref 3.4–4.8)
ALBUMIN/GLOB SERPL: 1.2 RATIO (ref 1.1–2)
ALP SERPL-CCNC: 35 UNIT/L (ref 40–150)
ALT SERPL-CCNC: 20 UNIT/L (ref 0–55)
ANION GAP SERPL CALC-SCNC: 6 MEQ/L
AST SERPL-CCNC: 17 UNIT/L (ref 5–34)
BACTERIA #/AREA URNS AUTO: ABNORMAL /HPF
BASOPHILS # BLD AUTO: 0.04 X10(3)/MCL
BASOPHILS NFR BLD AUTO: 0.6 %
BILIRUB SERPL-MCNC: 0.3 MG/DL
BILIRUB SERPL-MCNC: NEGATIVE MG/DL
BILIRUB UR QL STRIP.AUTO: NEGATIVE
BLOOD URINE, POC: NEGATIVE
BUN SERPL-MCNC: 9.6 MG/DL (ref 8.4–25.7)
CALCIUM SERPL-MCNC: 9.4 MG/DL (ref 8.8–10)
CHLORIDE SERPL-SCNC: 101 MMOL/L (ref 98–107)
CHOLEST SERPL-MCNC: 138 MG/DL
CHOLEST/HDLC SERPL: 3 {RATIO} (ref 0–5)
CLARITY UR: CLEAR
CO2 SERPL-SCNC: 31 MMOL/L (ref 23–31)
COLOR UR AUTO: ABNORMAL
COLOR, POC UA: YELLOW
CREAT SERPL-MCNC: 0.78 MG/DL (ref 0.72–1.25)
CREAT UR-MCNC: 104.7 MG/DL (ref 63–166)
CREAT/UREA NIT SERPL: 12
EOSINOPHIL # BLD AUTO: 0.25 X10(3)/MCL (ref 0–0.9)
EOSINOPHIL NFR BLD AUTO: 3.8 %
ERYTHROCYTE [DISTWIDTH] IN BLOOD BY AUTOMATED COUNT: 12.4 % (ref 11.5–17)
EST. AVERAGE GLUCOSE BLD GHB EST-MCNC: 114 MG/DL
GFR SERPLBLD CREATININE-BSD FMLA CKD-EPI: >60 ML/MIN/1.73/M2
GLOBULIN SER-MCNC: 3.3 GM/DL (ref 2.4–3.5)
GLUCOSE SERPL-MCNC: 136 MG/DL (ref 82–115)
GLUCOSE UR QL STRIP: NEGATIVE
GLUCOSE UR QL STRIP: NORMAL
HBA1C MFR BLD: 5.6 %
HCT VFR BLD AUTO: 44.9 % (ref 42–52)
HDLC SERPL-MCNC: 40 MG/DL (ref 35–60)
HGB BLD-MCNC: 14.6 G/DL (ref 14–18)
HGB UR QL STRIP: NEGATIVE
HYALINE CASTS #/AREA URNS LPF: ABNORMAL /LPF
IMM GRANULOCYTES # BLD AUTO: 0.02 X10(3)/MCL (ref 0–0.04)
IMM GRANULOCYTES NFR BLD AUTO: 0.3 %
KETONES UR QL STRIP: NEGATIVE
KETONES UR QL STRIP: NEGATIVE
LDLC SERPL CALC-MCNC: 80 MG/DL (ref 50–140)
LEUKOCYTE ESTERASE UR QL STRIP: NEGATIVE
LEUKOCYTE ESTERASE URINE, POC: NEGATIVE
LYMPHOCYTES # BLD AUTO: 2.09 X10(3)/MCL (ref 0.6–4.6)
LYMPHOCYTES NFR BLD AUTO: 32 %
MCH RBC QN AUTO: 28.3 PG (ref 27–31)
MCHC RBC AUTO-ENTMCNC: 32.5 G/DL (ref 33–36)
MCV RBC AUTO: 87 FL (ref 80–94)
MICROALBUMIN UR-MCNC: 14.1 UG/ML
MICROALBUMIN/CREAT RATIO PNL UR: 13.5 MG/GM CR (ref 0–30)
MONOCYTES # BLD AUTO: 0.59 X10(3)/MCL (ref 0.1–1.3)
MONOCYTES NFR BLD AUTO: 9 %
NEUTROPHILS # BLD AUTO: 3.55 X10(3)/MCL (ref 2.1–9.2)
NEUTROPHILS NFR BLD AUTO: 54.3 %
NITRITE UR QL STRIP: NEGATIVE
NITRITE, POC UA: NEGATIVE
NRBC BLD AUTO-RTO: 0 %
PH UR STRIP: 7 [PH]
PH, POC UA: 5.5
PLATELET # BLD AUTO: 241 X10(3)/MCL (ref 130–400)
PMV BLD AUTO: 9.2 FL (ref 7.4–10.4)
POTASSIUM SERPL-SCNC: 3.8 MMOL/L (ref 3.5–5.1)
PROT SERPL-MCNC: 7.1 GM/DL (ref 5.8–7.6)
PROT UR QL STRIP: NEGATIVE
PROTEIN, POC: NEGATIVE
PSA SERPL-MCNC: 0.67 NG/ML
RBC # BLD AUTO: 5.16 X10(6)/MCL (ref 4.7–6.1)
RBC #/AREA URNS AUTO: ABNORMAL /HPF
SODIUM SERPL-SCNC: 138 MMOL/L (ref 136–145)
SP GR UR STRIP.AUTO: 1.02 (ref 1–1.03)
SPECIFIC GRAVITY, POC UA: 1.02
SQUAMOUS #/AREA URNS LPF: ABNORMAL /HPF
TRIGL SERPL-MCNC: 90 MG/DL (ref 34–140)
TSH SERPL-ACNC: 1.3 UIU/ML (ref 0.35–4.94)
UROBILINOGEN UR STRIP-ACNC: ABNORMAL
UROBILINOGEN, POC UA: 0.2
VLDLC SERPL CALC-MCNC: 18 MG/DL
WBC # BLD AUTO: 6.54 X10(3)/MCL (ref 4.5–11.5)
WBC #/AREA URNS AUTO: ABNORMAL /HPF

## 2025-02-21 PROCEDURE — 81001 URINALYSIS AUTO W/SCOPE: CPT

## 2025-02-21 PROCEDURE — 84443 ASSAY THYROID STIM HORMONE: CPT

## 2025-02-21 PROCEDURE — 82306 VITAMIN D 25 HYDROXY: CPT

## 2025-02-21 PROCEDURE — 80061 LIPID PANEL: CPT

## 2025-02-21 PROCEDURE — 83036 HEMOGLOBIN GLYCOSYLATED A1C: CPT

## 2025-02-21 PROCEDURE — 84153 ASSAY OF PSA TOTAL: CPT

## 2025-02-21 PROCEDURE — 85025 COMPLETE CBC W/AUTO DIFF WBC: CPT

## 2025-02-21 PROCEDURE — 82570 ASSAY OF URINE CREATININE: CPT

## 2025-02-21 PROCEDURE — 80053 COMPREHEN METABOLIC PANEL: CPT

## 2025-02-21 PROCEDURE — 99214 OFFICE O/P EST MOD 30 MIN: CPT | Mod: PBBFAC | Performed by: UROLOGY

## 2025-02-21 PROCEDURE — 36415 COLL VENOUS BLD VENIPUNCTURE: CPT

## 2025-02-21 NOTE — PROGRESS NOTES
Patient seen by Dr. STEVE Morris will return in one year with PSA. Written and verbal discharge instructions given.

## 2025-02-21 NOTE — PROGRESS NOTES
CC:  Elevated PSA    HPI:  Jesus Martinez is a 60 y.o. male seen for follow-up of elevated PSA.  His PSA has been running less than one and then in September 2022 it makenzie to 1.91.  The following year in December of 2023 it did come back down 2.6 eight.  We have been following it and it has been less than one since then.  He has no urinary complaints.      Urinalysis:  Results for orders placed or performed in visit on 02/21/25   POCT URINE DIPSTICK WITH MICROSCOPE, AUTOMATED   Result Value Ref Range    Color, UA Yellow     Spec Grav UA 1.025     pH, UA 5.5     WBC, UA Negative     Nitrite, UA Negative     Protein, POC Negative     Glucose, UA Negative     Ketones, UA Negative     Urobilinogen, UA 0.2     Bilirubin, POC Negative     Blood, UA Negative      Microscopic Urinalysis:  WBC:   None per HPF     RBC:    None per HPF     Bacteria:    None per HPF     Squamous epithelial cells:  None per HPF      Crystals:   None    Lab Results:  PSA History:    10/24/17 06:34 11/20/18 06:20 11/16/20 06:18 09/17/21 06:33 09/19/22 06:27 12/11/23 10:27 03/25/24 06:13 08/19/24 06:12   0.9 0.8 0.57 0.58 1.91 0.68 0.53 0.62     ROS:  All systems reviewed and are negative except as documented in HPI and/or Assessment and Plan.     Patient Active Problem List:     Problem List[1]     Past Medical History:  Past Medical History:   Diagnosis Date    Hypertension     Mixed hyperlipidemia         Past Surgical History:  Past Surgical History:   Procedure Laterality Date    CARDIAC CATHETERIZATION      2018    CIRCUMCISION N/A 02/06/2024    Procedure: CIRCUMCISION;  Surgeon: Vero Lugo DO;  Location: [facility];  Service: Urology;  Laterality: N/A;    CIRCUMCISION          Family History:  Family History   Problem Relation Name Age of Onset    Heart disease Mother      Heart disease Father          Social History:  Social History     Socioeconomic History    Marital status:    Tobacco Use    Smoking status: Former     Current  packs/day: 0.00     Average packs/day: 0.3 packs/day for 7.0 years (1.8 ttl pk-yrs)     Types: Cigarettes     Start date:      Quit date:      Years since quittin.1    Smokeless tobacco: Never   Substance and Sexual Activity    Alcohol use: Not Currently    Drug use: Never    Sexual activity: Yes     Social Drivers of Health     Financial Resource Strain: Medium Risk (2025)    Overall Financial Resource Strain (CARDIA)     Difficulty of Paying Living Expenses: Somewhat hard   Food Insecurity: Food Insecurity Present (2025)    Hunger Vital Sign     Worried About Running Out of Food in the Last Year: Sometimes true     Ran Out of Food in the Last Year: Sometimes true   Transportation Needs: No Transportation Needs (2025)    TRANSPORTATION NEEDS     Transportation : No   Physical Activity: Insufficiently Active (2024)    Exercise Vital Sign     Days of Exercise per Week: 3 days     Minutes of Exercise per Session: 10 min   Stress: No Stress Concern Present (2024)    [employer] - Occupational Stress Questionnaire     Feeling of Stress : Not at all   Housing Stability: Low Risk  (2024)    Housing Stability Vital Sign     Unable to Pay for Housing in the Last Year: No     Number of Places Lived in the Last Year: 0     Unstable Housing in the Last Year: No        Allergies:  Review of patient's allergies indicates:  No Known Allergies     Objective:  Vitals:    25 1332   BP: 134/76   Pulse: 73   Temp: 98.4 °F (36.9 °C)     General:  Well developed, well nourished adult male in no acute distress  Abdomen: Soft, nontender, no masses  Extremities:  No clubbing, cyanosis, or edema  Neurologic:  Grossly intact  Musculoskeletal:  Normal tone    Assessment:  1. Rising PSA level  - POCT URINE DIPSTICK WITH MICROSCOPE, AUTOMATED  - PSA, Total (Diagnostic); Future  - PSA, Total (Diagnostic); Future    Plan:  PSA today.  As long as that value is at his  baseline we will go to yearly follow-up.      Follow-up tests needed:   PSA in one year.     Return appointment:  One year with above lab.                   [1]   Patient Active Problem List  Diagnosis    Mild CAD    Erectile dysfunction    Primary hypertension    Microscopic hematuria    Mixed hyperlipidemia    Obesity (BMI 30-39.9)    Plantar fasciitis    Vitamin D deficiency    Type 2 diabetes mellitus with hyperglycemia, without long-term current use of insulin    SANCHEZ (dyspnea on exertion)    Bilateral lower extremity pain    Peripheral edema    Deficient foreskin    Balanitis    Phimosis

## 2025-02-24 ENCOUNTER — PATIENT OUTREACH (OUTPATIENT)
Dept: ADMINISTRATIVE | Facility: OTHER | Age: 60
End: 2025-02-24
Payer: COMMERCIAL

## 2025-02-24 ENCOUNTER — RESULTS FOLLOW-UP (OUTPATIENT)
Dept: INTERNAL MEDICINE | Facility: CLINIC | Age: 60
End: 2025-02-24

## 2025-02-24 NOTE — PROGRESS NOTES
Labs reviewed. Upcoming appointment noted, will review with patient at that time.   FANG Stevenson

## 2025-03-31 ENCOUNTER — CLINICAL SUPPORT (OUTPATIENT)
Dept: INTERNAL MEDICINE | Facility: CLINIC | Age: 60
End: 2025-03-31
Attending: NURSE PRACTITIONER
Payer: COMMERCIAL

## 2025-03-31 ENCOUNTER — OFFICE VISIT (OUTPATIENT)
Dept: INTERNAL MEDICINE | Facility: CLINIC | Age: 60
End: 2025-03-31
Payer: COMMERCIAL

## 2025-03-31 VITALS
TEMPERATURE: 98 F | HEART RATE: 63 BPM | DIASTOLIC BLOOD PRESSURE: 77 MMHG | HEIGHT: 67 IN | WEIGHT: 265 LBS | SYSTOLIC BLOOD PRESSURE: 136 MMHG | BODY MASS INDEX: 41.59 KG/M2 | RESPIRATION RATE: 18 BRPM

## 2025-03-31 DIAGNOSIS — E11.65 TYPE 2 DIABETES MELLITUS WITH HYPERGLYCEMIA, WITHOUT LONG-TERM CURRENT USE OF INSULIN: ICD-10-CM

## 2025-03-31 DIAGNOSIS — I10 PRIMARY HYPERTENSION: ICD-10-CM

## 2025-03-31 DIAGNOSIS — N52.9 ERECTILE DYSFUNCTION, UNSPECIFIED ERECTILE DYSFUNCTION TYPE: ICD-10-CM

## 2025-03-31 DIAGNOSIS — Z00.00 WELLNESS EXAMINATION: Primary | ICD-10-CM

## 2025-03-31 DIAGNOSIS — E78.2 MIXED HYPERLIPIDEMIA: ICD-10-CM

## 2025-03-31 PROCEDURE — 92228 IMG RTA DETC/MNTR DS PHY/QHP: CPT | Mod: TC,PBBFAC | Performed by: FAMILY MEDICINE

## 2025-03-31 PROCEDURE — 3075F SYST BP GE 130 - 139MM HG: CPT | Mod: CPTII,,, | Performed by: NURSE PRACTITIONER

## 2025-03-31 PROCEDURE — 1160F RVW MEDS BY RX/DR IN RCRD: CPT | Mod: CPTII,,, | Performed by: NURSE PRACTITIONER

## 2025-03-31 PROCEDURE — 3008F BODY MASS INDEX DOCD: CPT | Mod: CPTII,,, | Performed by: NURSE PRACTITIONER

## 2025-03-31 PROCEDURE — 99214 OFFICE O/P EST MOD 30 MIN: CPT | Mod: PBBFAC | Performed by: NURSE PRACTITIONER

## 2025-03-31 PROCEDURE — 99396 PREV VISIT EST AGE 40-64: CPT | Mod: S$PBB,,, | Performed by: NURSE PRACTITIONER

## 2025-03-31 PROCEDURE — 3061F NEG MICROALBUMINURIA REV: CPT | Mod: CPTII,,, | Performed by: NURSE PRACTITIONER

## 2025-03-31 PROCEDURE — 92228 IMG RTA DETC/MNTR DS PHY/QHP: CPT | Mod: PBBFAC

## 2025-03-31 PROCEDURE — 3044F HG A1C LEVEL LT 7.0%: CPT | Mod: CPTII,,, | Performed by: NURSE PRACTITIONER

## 2025-03-31 PROCEDURE — 4010F ACE/ARB THERAPY RXD/TAKEN: CPT | Mod: CPTII,,, | Performed by: NURSE PRACTITIONER

## 2025-03-31 PROCEDURE — 3078F DIAST BP <80 MM HG: CPT | Mod: CPTII,,, | Performed by: NURSE PRACTITIONER

## 2025-03-31 PROCEDURE — 1159F MED LIST DOCD IN RCRD: CPT | Mod: CPTII,,, | Performed by: NURSE PRACTITIONER

## 2025-03-31 PROCEDURE — 3066F NEPHROPATHY DOC TX: CPT | Mod: CPTII,,, | Performed by: NURSE PRACTITIONER

## 2025-03-31 RX ORDER — LISINOPRIL 20 MG/1
20 TABLET ORAL NIGHTLY
Qty: 90 TABLET | Refills: 2 | Status: SHIPPED | OUTPATIENT
Start: 2025-03-31 | End: 2025-12-26

## 2025-03-31 RX ORDER — EZETIMIBE 10 MG/1
10 TABLET ORAL NIGHTLY
Qty: 90 TABLET | Refills: 2 | Status: SHIPPED | OUTPATIENT
Start: 2025-03-31 | End: 2025-12-26

## 2025-03-31 RX ORDER — METFORMIN HYDROCHLORIDE 1000 MG/1
1000 TABLET ORAL 2 TIMES DAILY WITH MEALS
Qty: 180 TABLET | Refills: 2 | Status: SHIPPED | OUTPATIENT
Start: 2025-03-31 | End: 2025-12-26

## 2025-03-31 RX ORDER — LISINOPRIL AND HYDROCHLOROTHIAZIDE 12.5; 2 MG/1; MG/1
1 TABLET ORAL DAILY
Qty: 90 TABLET | Refills: 2 | Status: SHIPPED | OUTPATIENT
Start: 2025-03-31 | End: 2025-12-26

## 2025-03-31 RX ORDER — TADALAFIL 20 MG/1
20 TABLET ORAL DAILY PRN
Qty: 10 TABLET | Refills: 12 | Status: SHIPPED | OUTPATIENT
Start: 2025-03-31

## 2025-03-31 RX ORDER — ROSUVASTATIN CALCIUM 40 MG/1
40 TABLET, COATED ORAL NIGHTLY
Qty: 90 TABLET | Refills: 2 | Status: SHIPPED | OUTPATIENT
Start: 2025-03-31 | End: 2025-12-26

## 2025-03-31 NOTE — ASSESSMENT & PLAN NOTE
FLP At Goal   Continue RX rosuvastatin 40 mg q hs and Zetia 10 mg daily     Follow a low cholesterol, low saturated fat diet with less than 200 mg of cholesterol a day.   Avoid fried foods and high saturated fats (clinton, sausage, cookies, cakes, chips, cheese, whole milk, butter, mayonnaise, creamy dressings, gravy, stew, gumbo, boudin, cracklins and cream sauces).  Add flax seed or fish oil supplements to diet.   Increase dietary fiber.   Regular exercise improves cholesterol levels.  Physical activity 5 times a week for 30 minutes per day (or 150 minutes per week).   Stressed importance of dietary modifications.    Lab Results   Component Value Date    CHOL 138 02/21/2025     Lab Results   Component Value Date    HDL 40 02/21/2025     Lab Results   Component Value Date    TRIG 90 02/21/2025     Lab Results   Component Value Date    VLDL 18 02/21/2025     Lab Results   Component Value Date    LDL 80.00 02/21/2025

## 2025-03-31 NOTE — ASSESSMENT & PLAN NOTE
BP At Goal  Continue RX lisinopril 20 mg daily / hctz/lisinopril 12.5 / 20 mg daily  BP: 136/77  Low Sodium Diet (Dash Diet - less than 2 grams of sodium per day).  Maintain healthy weight with goal BMI <30. Exercise 30 minutes per day 5 days per week.

## 2025-03-31 NOTE — PROGRESS NOTES
Annamarie Galicia, FANG   [facility]  [address] [facility] [address]      PATIENT NAME: Jesus Martinez  : 1965  DATE: 3/31/25  MRN: [id number]      Reason for Visit / Chief Complaint: Annual Exam       History of Present Illness / Problem Focused Workflow     Jesus Martinez presents to the clinic with Annual Exam     59 yo AAM here today for f/u  Medical problems includes mild CAD, HLD, HTN, T2DM, Vitamin D deficiency, right knee pain, and ED. Former smoker.  <10 pack/yr history of smoking. Followed by [facility].      Prostate Cancer Screening - 24 PSA 0.53  Colon Cancer Screening -  23 Cologuard Negative   Osteoporosis Screening - 24  Vitamin D level 40.5  HCV Screenin/15/22 Negative  Wellness Visit: 2025  History of Present Illness  Patient presents today for follow up. He missed his flu vaccine last year but denies experiencing any flu symptoms. He was recently evaluated by urology clinic with follow up scheduled in one year. Blood counts, electrolytes, kidney function, and liver function tests were normal. Cholesterol levels were perfect. Vitamin D level was normal. HbA1c was 5.6, below pre-diabetes range. Thyroid level was normal. PSA remained stable. Urinalysis was normal. Will f/u in 6 months with labs and prn.             Review of Systems     Review of Systems   Constitutional: Negative.    HENT: Negative.     Eyes: Negative.    Respiratory: Negative.     Cardiovascular: Negative.    Gastrointestinal: Negative.    Endocrine: Negative.    Genitourinary: Negative.    Neurological: Negative.    Psychiatric/Behavioral: Negative.           Medications and Allergies     Medications  Medication List with Changes/Refills   Current Medications    BLOOD SUGAR DIAGNOSTIC STRP    To check BG 2 times daily, to use with insurance preferred meter E11.9    EASY TOUCH TWIST LANCETS 33  GAUGE MISC    USE AS DIRECTED TO test TWICE DAILY    TRUE METRIX GLUCOSE METER MISC    USE TO test TWICE DAILY   Changed and/or Refilled Medications    Modified Medication Previous Medication    EZETIMIBE (ZETIA) 10 MG TABLET ezetimibe (ZETIA) 10 mg tablet       Take 1 tablet (10 mg total) by mouth every evening. For High Cholesterol    Take 1 tablet (10 mg total) by mouth every evening. For High Cholesterol    LISINOPRIL (PRINIVIL,ZESTRIL) 20 MG TABLET lisinopriL (PRINIVIL,ZESTRIL) 20 MG tablet       Take 1 tablet (20 mg total) by mouth every evening. For High Blood Pressure    Take 1 tablet (20 mg total) by mouth every evening. For High Blood Pressure    LISINOPRIL-HYDROCHLOROTHIAZIDE (PRINZIDE,ZESTORETIC) 20-12.5 MG PER TABLET lisinopriL-hydrochlorothiazide (PRINZIDE,ZESTORETIC) 20-12.5 mg per tablet       Take 1 tablet by mouth once daily. For High Blood Pressure    Take 1 tablet by mouth once daily. For High Blood Pressure    METFORMIN (GLUCOPHAGE) 1000 MG TABLET metFORMIN (GLUCOPHAGE) 1000 MG tablet       Take 1 tablet (1,000 mg total) by mouth 2 (two) times daily with meals. For Diabetes    Take 1 tablet (1,000 mg total) by mouth 2 (two) times daily with meals. For Diabetes    ROSUVASTATIN (CRESTOR) 40 MG TAB rosuvastatin (CRESTOR) 40 MG Tab       Take 1 tablet (40 mg total) by mouth every evening. For High Cholesterol    Take 1 tablet (40 mg total) by mouth every evening. For High Cholesterol    TADALAFIL (CIALIS) 20 MG TAB tadalafiL (CIALIS) 20 MG Tab       Take 1 tablet (20 mg total) by mouth daily as needed (Erectile Dysfunction).    Take 1 tablet (20 mg total) by mouth daily as needed (Erectile Dysfunction).   Discontinued Medications    CLOTRIMAZOLE (LOTRIMIN) 1 % CREAM    Apply topically 2 (two) times daily. for 10 days    MENTHOL 3.5 % GEL    Apply topically.         Allergies  Review of patient's allergies indicates:  No Known Allergies    Physical Examination     Vitals:    03/31/25 1252   BP:  136/77   Pulse: 63   Resp: 18   Temp: 98 °F (36.7 °C)     Physical Exam  Vitals reviewed.   Constitutional:       Appearance: Normal appearance. He is obese.   HENT:      Head: Normocephalic.   Cardiovascular:      Rate and Rhythm: Normal rate and regular rhythm.      Pulses: Normal pulses.      Heart sounds: Normal heart sounds.   Pulmonary:      Effort: Pulmonary effort is normal.      Breath sounds: Normal breath sounds.   Abdominal:      General: Abdomen is flat.      Palpations: Abdomen is soft.   Musculoskeletal:         General: Normal range of motion.      Cervical back: Normal range of motion.   Skin:     General: Skin is warm and dry.   Neurological:      Mental Status: He is alert.   Psychiatric:         Mood and Affect: Mood normal.           Results     Lab Results   Component Value Date    WBC 6.54 02/21/2025    RBC 5.16 02/21/2025    HGB 14.6 02/21/2025    HCT 44.9 02/21/2025    MCV 87.0 02/21/2025    MCH 28.3 02/21/2025    MCHC 32.5 (L) 02/21/2025    RDW 12.4 02/21/2025     02/21/2025    MPV 9.2 02/21/2025     Sodium   Date Value Ref Range Status   02/21/2025 138 136 - 145 mmol/L Final     Potassium   Date Value Ref Range Status   02/21/2025 3.8 3.5 - 5.1 mmol/L Final     Chloride   Date Value Ref Range Status   02/21/2025 101 98 - 107 mmol/L Final     CO2   Date Value Ref Range Status   02/21/2025 31 23 - 31 mmol/L Final     Blood Urea Nitrogen   Date Value Ref Range Status   02/21/2025 9.6 8.4 - 25.7 mg/dL Final     Creatinine   Date Value Ref Range Status   02/21/2025 0.78 0.72 - 1.25 mg/dL Final     Calcium   Date Value Ref Range Status   02/21/2025 9.4 8.8 - 10.0 mg/dL Final     Albumin   Date Value Ref Range Status   02/21/2025 3.8 3.4 - 4.8 g/dL Final     Bilirubin Total   Date Value Ref Range Status   02/21/2025 0.3 <=1.5 mg/dL Final     ALP   Date Value Ref Range Status   02/21/2025 35 (L) 40 - 150 unit/L Final     AST   Date Value Ref Range Status   02/21/2025 17 5 - 34 unit/L Final      ALT   Date Value Ref Range Status   02/21/2025 20 0 - 55 unit/L Final     Estimated GFR-Non    Date Value Ref Range Status   03/10/2022 >105 >=90      Lab Results   Component Value Date    CHOL 138 02/21/2025     Lab Results   Component Value Date    HDL 40 02/21/2025     Lab Results   Component Value Date    TRIG 90 02/21/2025     Lab Results   Component Value Date    VLDL 18 02/21/2025     Lab Results   Component Value Date    LDL 80.00 02/21/2025     Lab Results   Component Value Date    TSH 1.302 02/21/2025     Lab Results   Component Value Date    PHUR 7.0 02/21/2025    SPECGRAV 1.025 02/21/2025    PROTEINUA Negative 02/21/2025    GLUCUA Normal 02/21/2025    KETONESU Negative 02/21/2025    OCCULTUA Negative 02/21/2025    NITRITE Negative 02/21/2025    LEUKOCYTESUR Negative 02/21/2025     Lab Results   Component Value Date    HGBA1C 5.6 02/21/2025    HGBA1C 7.5 (H) 05/24/2024    HGBA1C 11.5 (H) 03/25/2024           Assessment         ICD-10-CM ICD-9-CM   1. Wellness examination  Z00.00 V70.0   2. Type 2 diabetes mellitus with hyperglycemia, without long-term current use of insulin  E11.65 250.00     790.29   3. Mixed hyperlipidemia  E78.2 272.2   4. Primary hypertension  I10 401.9   5. Erectile dysfunction, unspecified erectile dysfunction type  N52.9 607.84       Plan      1. Wellness examination  Assessment & Plan:  Pt wellness visit completed today with appropriate lab work.    Topics Reviewed / Updated  Immunizations Discussed  Dicussed Healthy Diet &   Encouraged to exercise 3 x weekly  Increase Water Intake  Eat more fruits and vegetables  Avoid soda & alcohol        2. Type 2 diabetes mellitus with hyperglycemia, without long-term current use of insulin  -     Diabetic Eye Screening Photo; Future; Expected date: 03/31/2025  -     metFORMIN (GLUCOPHAGE) 1000 MG tablet; Take 1 tablet (1,000 mg total) by mouth 2 (two) times daily with meals. For Diabetes  Dispense: 180 tablet; Refill:  2    3. Mixed hyperlipidemia  Assessment & Plan:  FLP At Goal   Continue RX rosuvastatin 40 mg q hs and Zetia 10 mg daily     Follow a low cholesterol, low saturated fat diet with less than 200 mg of cholesterol a day.   Avoid fried foods and high saturated fats (clinton, sausage, cookies, cakes, chips, cheese, whole milk, butter, mayonnaise, creamy dressings, gravy, stew, gumbo, boudin, cracklins and cream sauces).  Add flax seed or fish oil supplements to diet.   Increase dietary fiber.   Regular exercise improves cholesterol levels.  Physical activity 5 times a week for 30 minutes per day (or 150 minutes per week).   Stressed importance of dietary modifications.    Lab Results   Component Value Date    CHOL 138 02/21/2025     Lab Results   Component Value Date    HDL 40 02/21/2025     Lab Results   Component Value Date    TRIG 90 02/21/2025     Lab Results   Component Value Date    VLDL 18 02/21/2025     Lab Results   Component Value Date    LDL 80.00 02/21/2025         Orders:  -     ezetimibe (ZETIA) 10 mg tablet; Take 1 tablet (10 mg total) by mouth every evening. For High Cholesterol  Dispense: 90 tablet; Refill: 2  -     rosuvastatin (CRESTOR) 40 MG Tab; Take 1 tablet (40 mg total) by mouth every evening. For High Cholesterol  Dispense: 90 tablet; Refill: 2    4. Primary hypertension  Assessment & Plan:  BP At Goal  Continue RX lisinopril 20 mg daily / hctz/lisinopril 12.5 / 20 mg daily  BP: 136/77  Low Sodium Diet (Dash Diet - less than 2 grams of sodium per day).  Maintain healthy weight with goal BMI <30. Exercise 30 minutes per day 5 days per week.          Orders:  -     lisinopriL (PRINIVIL,ZESTRIL) 20 MG tablet; Take 1 tablet (20 mg total) by mouth every evening. For High Blood Pressure  Dispense: 90 tablet; Refill: 2  -     lisinopriL-hydrochlorothiazide (PRINZIDE,ZESTORETIC) 20-12.5 mg per tablet; Take 1 tablet by mouth once daily. For High Blood Pressure  Dispense: 90 tablet; Refill: 2    5. Erectile  dysfunction, unspecified erectile dysfunction type  Assessment & Plan:  Cialsis 20 mg tab prn     Orders:  -     tadalafiL (CIALIS) 20 MG Tab; Take 1 tablet (20 mg total) by mouth daily as needed (Erectile Dysfunction).  Dispense: 10 tablet; Refill: 12         Future Appointments   Date Time Provider Department Center   3/31/2025  1:20 PM [facility]    10/6/2025  1:40 PM [facility]    2/23/2026  2:00 PM [facility] Lug[facility] DO [facility]            Signature:     [facility]  [address] [id number]    Date of encounter: 3/31/25    This note was generated with the assistance of ambient listening technology. Verbal consent was obtained by the patient and accompanying visitor(s) for the recording of patient appointment to facilitate this note. I attest to having reviewed and edited the generated note for accuracy, though some syntax or spelling errors may persist. Please contact the author of this note for any clarification.

## 2025-03-31 NOTE — PROGRESS NOTES
Jesus Martinez is a 60 y.o. male here for a diabetic eye screening with non-dilated fundus photos per FANG Stevenson.    Patient cooperative?: Yes  Small pupils?: Yes  Last eye exam: N/A    For exam results, see Encounter Report.

## 2025-05-01 ENCOUNTER — PATIENT OUTREACH (OUTPATIENT)
Facility: CLINIC | Age: 60
End: 2025-05-01
Payer: COMMERCIAL

## 2025-05-01 NOTE — PROGRESS NOTES
Health Maintenance Topic(s) Outreach Outcomes & Actions Taken:     Additional Notes:  Annual Wellness Visit: 3/31/2025     Next PCP F/U: 10/6/2025  Health Maintenance Topics Overdue:  VBHM Score: 1   Foot Exam       HTN: controlled. Lisinopril, Lisin/HCTZ. Med compliance per [facility].      DM: Last A1c 5.6. Metformin last filled 8/29/2024.    Statin Therapy for prevention of CVD: Rosuvastatin. Med compliance per [facility].       Care Management, Digital Medicine, and/or Education Referrals      Next Steps - Referral Actions: [facility] to [facility] for SDOH referral at last outreach. Closed 2/4/2025

## 2025-06-10 ENCOUNTER — TELEPHONE (OUTPATIENT)
Dept: INTERNAL MEDICINE | Facility: CLINIC | Age: 60
End: 2025-06-10
Payer: COMMERCIAL

## 2025-06-10 NOTE — TELEPHONE ENCOUNTER
----- Message from Niya sent at 6/9/2025  8:56 AM CDT -----  Regarding: med refill  .Who Called: Jesus MartinezRefill or New Rx:RefillRX Name and Strength:rosuvastatin (CRESTOR) 40 MG TabHow is the patient currently taking it? (ex. 1XDay):1xdayIs this a 30 day or 90 day RX:90Local or Mail Order:localList of preferred pharmacies on file (remove unneeded):[address] Phone: [phone]: [phone] Provider:HandyPreferred Method of Contact: Phone CallPatient's Preferred Phone Number on File: [phone] Best Call Back Number, if different:Additional Information: pt needs authorization from dr to refill prescription

## 2025-08-21 DIAGNOSIS — I10 PRIMARY HYPERTENSION: ICD-10-CM

## 2025-08-21 DIAGNOSIS — E11.65 TYPE 2 DIABETES MELLITUS WITH HYPERGLYCEMIA, WITHOUT LONG-TERM CURRENT USE OF INSULIN: ICD-10-CM

## 2025-08-21 DIAGNOSIS — E78.2 MIXED HYPERLIPIDEMIA: ICD-10-CM

## 2025-08-21 RX ORDER — LISINOPRIL AND HYDROCHLOROTHIAZIDE 12.5; 2 MG/1; MG/1
1 TABLET ORAL DAILY
Qty: 90 TABLET | Refills: 2 | Status: SHIPPED | OUTPATIENT
Start: 2025-08-21 | End: 2026-05-18

## 2025-08-21 RX ORDER — EZETIMIBE 10 MG/1
10 TABLET ORAL NIGHTLY
Qty: 90 TABLET | Refills: 2 | Status: SHIPPED | OUTPATIENT
Start: 2025-08-21 | End: 2026-05-18

## 2025-08-21 RX ORDER — METFORMIN HYDROCHLORIDE 1000 MG/1
1000 TABLET ORAL 2 TIMES DAILY WITH MEALS
Qty: 180 TABLET | Refills: 2 | Status: SHIPPED | OUTPATIENT
Start: 2025-08-21 | End: 2026-05-18

## 2025-08-21 RX ORDER — ROSUVASTATIN CALCIUM 40 MG/1
40 TABLET, COATED ORAL NIGHTLY
Qty: 90 TABLET | Refills: 2 | Status: SHIPPED | OUTPATIENT
Start: 2025-08-21 | End: 2026-05-18

## 2025-08-21 RX ORDER — LISINOPRIL 20 MG/1
20 TABLET ORAL NIGHTLY
Qty: 90 TABLET | Refills: 2 | Status: SHIPPED | OUTPATIENT
Start: 2025-08-21 | End: 2026-05-18

## (undated) DEVICE — ELECTRODE REM POLYHESIVE II

## (undated) DEVICE — SYR 10CC LUER LOCK

## (undated) DEVICE — BANDAGE COHES ASST CLR 2INX5YD

## (undated) DEVICE — SUT 4-0 CHROMIC GUT / RB1

## (undated) DEVICE — SOL NACL IRR 1000ML BTL

## (undated) DEVICE — GLOVE SENSICARE PI GRN 7.5

## (undated) DEVICE — GLOVE SIGNATURE ESSNTL LTX 7

## (undated) DEVICE — HEMOSTAT SURGICEL PWD 3G

## (undated) DEVICE — GLOVE SIGNATURE MICRO LTX 7

## (undated) DEVICE — Device

## (undated) DEVICE — SUT CHROMIC 3-0 SH 27IN GUT

## (undated) DEVICE — GAUZE VASELINE PETRO 3X18IN

## (undated) DEVICE — NDL HYPO REG 25G X 1 1/2

## (undated) DEVICE — GAUZE BANDAGE CONFORM 3X75IN

## (undated) DEVICE — KIT SURGICAL TURNOVER